# Patient Record
Sex: MALE | Race: WHITE | NOT HISPANIC OR LATINO | Employment: STUDENT | URBAN - METROPOLITAN AREA
[De-identification: names, ages, dates, MRNs, and addresses within clinical notes are randomized per-mention and may not be internally consistent; named-entity substitution may affect disease eponyms.]

---
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
## 2020-01-21 ENCOUNTER — OFFICE VISIT (OUTPATIENT)
Dept: INTERNAL MEDICINE | Facility: CLINIC | Age: 20
End: 2020-01-21
Payer: COMMERCIAL

## 2020-01-21 VITALS
OXYGEN SATURATION: 97 % | WEIGHT: 283.5 LBS | DIASTOLIC BLOOD PRESSURE: 82 MMHG | SYSTOLIC BLOOD PRESSURE: 132 MMHG | TEMPERATURE: 98 F | HEART RATE: 75 BPM | BODY MASS INDEX: 40.68 KG/M2

## 2020-01-21 DIAGNOSIS — J01.00 ACUTE NON-RECURRENT MAXILLARY SINUSITIS: Primary | ICD-10-CM

## 2020-01-21 DIAGNOSIS — F98.8 ATTENTION DEFICIT DISORDER, UNSPECIFIED HYPERACTIVITY PRESENCE: ICD-10-CM

## 2020-01-21 PROCEDURE — 99214 PR OFFICE/OUTPT VISIT, EST, LEVL IV, 30-39 MIN: ICD-10-PCS | Mod: S$GLB,,, | Performed by: FAMILY MEDICINE

## 2020-01-21 PROCEDURE — 99214 OFFICE O/P EST MOD 30 MIN: CPT | Mod: S$GLB,,, | Performed by: FAMILY MEDICINE

## 2020-01-21 PROCEDURE — 3008F PR BODY MASS INDEX (BMI) DOCUMENTED: ICD-10-PCS | Mod: CPTII,S$GLB,, | Performed by: FAMILY MEDICINE

## 2020-01-21 PROCEDURE — 99999 PR PBB SHADOW E&M-EST. PATIENT-LVL III: ICD-10-PCS | Mod: PBBFAC,,, | Performed by: FAMILY MEDICINE

## 2020-01-21 PROCEDURE — 99999 PR PBB SHADOW E&M-EST. PATIENT-LVL III: CPT | Mod: PBBFAC,,, | Performed by: FAMILY MEDICINE

## 2020-01-21 PROCEDURE — 3008F BODY MASS INDEX DOCD: CPT | Mod: CPTII,S$GLB,, | Performed by: FAMILY MEDICINE

## 2020-01-21 RX ORDER — AZITHROMYCIN 250 MG/1
TABLET, FILM COATED ORAL
Qty: 6 TABLET | Refills: 0 | Status: SHIPPED | OUTPATIENT
Start: 2020-01-21 | End: 2020-01-26

## 2020-01-21 RX ORDER — METHYLPREDNISOLONE 4 MG/1
TABLET ORAL
Qty: 1 PACKAGE | Refills: 0 | Status: SHIPPED | OUTPATIENT
Start: 2020-01-21 | End: 2021-01-19 | Stop reason: ALTCHOICE

## 2020-01-21 NOTE — PROGRESS NOTES
Subjective:       Patient ID: Simon Goode is a 19 y.o. male.    Chief Complaint: Nasal Congestion    Pt is a 19 year who has had for 1 week cough and sinus congestion and pressure. Pt is coughing worse at night lying down.     Review of Systems   Constitutional: Negative.    HENT: Positive for postnasal drip, sinus pressure and sinus pain.    Respiratory: Positive for cough.    Cardiovascular: Negative for chest pain and leg swelling.   Genitourinary: Negative.    Neurological: Negative.    Psychiatric/Behavioral: Negative.        Objective:      Physical Exam   Constitutional: He is oriented to person, place, and time. He appears well-developed and well-nourished.   HENT:   Right Ear: Tympanic membrane is erythematous. A middle ear effusion is present.   Left Ear: Tympanic membrane is erythematous. A middle ear effusion is present.   Nose: Mucosal edema and rhinorrhea present. Right sinus exhibits maxillary sinus tenderness.   Mouth/Throat: Posterior oropharyngeal erythema present.   Cardiovascular: Normal rate and regular rhythm. Exam reveals no friction rub.   No murmur heard.  Pulmonary/Chest: Effort normal and breath sounds normal.   Abdominal: Soft. Bowel sounds are normal.   Neurological: He is alert and oriented to person, place, and time.   Psychiatric: He has a normal mood and affect. His behavior is normal.       Assessment:       1. Acute non-recurrent maxillary sinusitis    2. Attention deficit disorder, unspecified hyperactivity presence        Plan:       Acute non-recurrent maxillary sinusitis  Comments:  azithromycin and medrol      Attention deficit disorder, unspecified hyperactivity presence  Comments:  Pt is as needed focalin    Other orders  -     azithromycin (Z-PARAG) 250 MG tablet; Take 2 tablets by mouth on day 1; Take 1 tablet by mouth on days 2-5  Dispense: 6 tablet; Refill: 0  -     methylPREDNISolone (MEDROL DOSEPACK) 4 mg tablet; use as directed  Dispense: 1 Package; Refill: 0

## 2020-03-19 RX ORDER — FLUOXETINE HYDROCHLORIDE 40 MG/1
40 CAPSULE ORAL DAILY
Qty: 90 CAPSULE | Refills: 2 | Status: SHIPPED | OUTPATIENT
Start: 2020-03-19 | End: 2020-12-22 | Stop reason: SDUPTHER

## 2020-09-29 ENCOUNTER — IMMUNIZATION (OUTPATIENT)
Dept: INTERNAL MEDICINE | Facility: CLINIC | Age: 20
End: 2020-09-29
Payer: COMMERCIAL

## 2020-09-29 PROCEDURE — 90686 FLU VACCINE (QUAD) GREATER THAN OR EQUAL TO 3YO PRESERVATIVE FREE IM: ICD-10-PCS | Mod: S$GLB,,, | Performed by: FAMILY MEDICINE

## 2020-09-29 PROCEDURE — 90471 IMMUNIZATION ADMIN: CPT | Mod: S$GLB,,, | Performed by: FAMILY MEDICINE

## 2020-09-29 PROCEDURE — 90471 FLU VACCINE (QUAD) GREATER THAN OR EQUAL TO 3YO PRESERVATIVE FREE IM: ICD-10-PCS | Mod: S$GLB,,, | Performed by: FAMILY MEDICINE

## 2020-09-29 PROCEDURE — 90686 IIV4 VACC NO PRSV 0.5 ML IM: CPT | Mod: S$GLB,,, | Performed by: FAMILY MEDICINE

## 2020-11-05 ENCOUNTER — TELEPHONE (OUTPATIENT)
Dept: INTERNAL MEDICINE | Facility: CLINIC | Age: 20
End: 2020-11-05

## 2020-11-05 NOTE — TELEPHONE ENCOUNTER
----- Message from Peri Goldstein sent at 11/5/2020  4:39 PM CST -----  Contact: Pt  Pt was exposed to Covid 19 at work and is seeking medical advice and testing, please call them back at [phone] (home)     Thanks    Peri Goldstein

## 2020-11-05 NOTE — TELEPHONE ENCOUNTER
Pt stated he was exposed to covid but has no symptoms. I informed him that we cannot test him unless he has symptoms. He verbalized understanding.

## 2020-12-18 RX ORDER — FLUOXETINE HYDROCHLORIDE 40 MG/1
40 CAPSULE ORAL DAILY
Qty: 90 CAPSULE | Refills: 2 | OUTPATIENT
Start: 2020-12-18 | End: 2021-03-18

## 2020-12-18 NOTE — TELEPHONE ENCOUNTER
Informed pt that we were unable to get his script filled due to him needing an appointment. He verbalized understanding and will call back to schedule.

## 2020-12-22 RX ORDER — FLUOXETINE HYDROCHLORIDE 40 MG/1
40 CAPSULE ORAL DAILY
Qty: 30 CAPSULE | Refills: 0 | Status: SHIPPED | OUTPATIENT
Start: 2020-12-22 | End: 2021-01-19 | Stop reason: SDUPTHER

## 2020-12-22 NOTE — TELEPHONE ENCOUNTER
Informed pt that his medication was renewed by the on call provider, but he will need to establish care with someone new. He verbalized understanding and scheduled appointment.

## 2021-01-01 NOTE — TELEPHONE ENCOUNTER
Approved but for a one month supply only   Patient needs a follow up appointment.  No additional refills without a visit.  Please call and schedule.   Single liveborn infant, delivered by

## 2021-01-19 ENCOUNTER — LAB VISIT (OUTPATIENT)
Dept: LAB | Facility: HOSPITAL | Age: 21
End: 2021-01-19
Attending: INTERNAL MEDICINE
Payer: COMMERCIAL

## 2021-01-19 ENCOUNTER — OFFICE VISIT (OUTPATIENT)
Dept: INTERNAL MEDICINE | Facility: CLINIC | Age: 21
End: 2021-01-19
Payer: COMMERCIAL

## 2021-01-19 VITALS
WEIGHT: 228.63 LBS | BODY MASS INDEX: 32.8 KG/M2 | HEART RATE: 88 BPM | TEMPERATURE: 97 F | SYSTOLIC BLOOD PRESSURE: 126 MMHG | OXYGEN SATURATION: 97 % | DIASTOLIC BLOOD PRESSURE: 80 MMHG

## 2021-01-19 DIAGNOSIS — F32.1 CURRENT MODERATE EPISODE OF MAJOR DEPRESSIVE DISORDER WITHOUT PRIOR EPISODE: ICD-10-CM

## 2021-01-19 DIAGNOSIS — Z00.00 ROUTINE GENERAL MEDICAL EXAMINATION AT HEALTH CARE FACILITY: ICD-10-CM

## 2021-01-19 DIAGNOSIS — Z00.00 ROUTINE GENERAL MEDICAL EXAMINATION AT HEALTH CARE FACILITY: Primary | ICD-10-CM

## 2021-01-19 PROBLEM — R68.89 FLU-LIKE SYMPTOMS: Status: RESOLVED | Noted: 2019-11-08 | Resolved: 2021-01-19

## 2021-01-19 LAB
BASOPHILS # BLD AUTO: 0.03 K/UL (ref 0–0.2)
BASOPHILS NFR BLD: 0.6 % (ref 0–1.9)
DIFFERENTIAL METHOD: NORMAL
EOSINOPHIL # BLD AUTO: 0.1 K/UL (ref 0–0.5)
EOSINOPHIL NFR BLD: 1.4 % (ref 0–8)
ERYTHROCYTE [DISTWIDTH] IN BLOOD BY AUTOMATED COUNT: 12.4 % (ref 11.5–14.5)
HCT VFR BLD AUTO: 48.6 % (ref 40–54)
HGB BLD-MCNC: 16.4 G/DL (ref 14–18)
IMM GRANULOCYTES # BLD AUTO: 0.01 K/UL (ref 0–0.04)
IMM GRANULOCYTES NFR BLD AUTO: 0.2 % (ref 0–0.5)
LYMPHOCYTES # BLD AUTO: 1.8 K/UL (ref 1–4.8)
LYMPHOCYTES NFR BLD: 37.2 % (ref 18–48)
MCH RBC QN AUTO: 30.7 PG (ref 27–31)
MCHC RBC AUTO-ENTMCNC: 33.7 G/DL (ref 32–36)
MCV RBC AUTO: 91 FL (ref 82–98)
MONOCYTES # BLD AUTO: 0.5 K/UL (ref 0.3–1)
MONOCYTES NFR BLD: 10 % (ref 4–15)
NEUTROPHILS # BLD AUTO: 2.5 K/UL (ref 1.8–7.7)
NEUTROPHILS NFR BLD: 50.6 % (ref 38–73)
NRBC BLD-RTO: 0 /100 WBC
PLATELET # BLD AUTO: 216 K/UL (ref 150–350)
PMV BLD AUTO: 11.5 FL (ref 9.2–12.9)
RBC # BLD AUTO: 5.35 M/UL (ref 4.6–6.2)
WBC # BLD AUTO: 4.89 K/UL (ref 3.9–12.7)

## 2021-01-19 PROCEDURE — 86703 HIV-1/HIV-2 1 RESULT ANTBDY: CPT

## 2021-01-19 PROCEDURE — 86803 HEPATITIS C AB TEST: CPT

## 2021-01-19 PROCEDURE — 85025 COMPLETE CBC W/AUTO DIFF WBC: CPT

## 2021-01-19 PROCEDURE — 3008F PR BODY MASS INDEX (BMI) DOCUMENTED: ICD-10-PCS | Mod: CPTII,S$GLB,, | Performed by: INTERNAL MEDICINE

## 2021-01-19 PROCEDURE — 99395 PREV VISIT EST AGE 18-39: CPT | Mod: S$GLB,,, | Performed by: INTERNAL MEDICINE

## 2021-01-19 PROCEDURE — 80061 LIPID PANEL: CPT

## 2021-01-19 PROCEDURE — 36415 COLL VENOUS BLD VENIPUNCTURE: CPT

## 2021-01-19 PROCEDURE — 80053 COMPREHEN METABOLIC PANEL: CPT

## 2021-01-19 PROCEDURE — 84439 ASSAY OF FREE THYROXINE: CPT

## 2021-01-19 PROCEDURE — 1126F PR PAIN SEVERITY QUANTIFIED, NO PAIN PRESENT: ICD-10-PCS | Mod: S$GLB,,, | Performed by: INTERNAL MEDICINE

## 2021-01-19 PROCEDURE — 99395 PR PREVENTIVE VISIT,EST,18-39: ICD-10-PCS | Mod: S$GLB,,, | Performed by: INTERNAL MEDICINE

## 2021-01-19 PROCEDURE — 99999 PR PBB SHADOW E&M-EST. PATIENT-LVL III: CPT | Mod: PBBFAC,,, | Performed by: INTERNAL MEDICINE

## 2021-01-19 PROCEDURE — 3008F BODY MASS INDEX DOCD: CPT | Mod: CPTII,S$GLB,, | Performed by: INTERNAL MEDICINE

## 2021-01-19 PROCEDURE — 1126F AMNT PAIN NOTED NONE PRSNT: CPT | Mod: S$GLB,,, | Performed by: INTERNAL MEDICINE

## 2021-01-19 PROCEDURE — 84443 ASSAY THYROID STIM HORMONE: CPT

## 2021-01-19 PROCEDURE — 99999 PR PBB SHADOW E&M-EST. PATIENT-LVL III: ICD-10-PCS | Mod: PBBFAC,,, | Performed by: INTERNAL MEDICINE

## 2021-01-19 RX ORDER — DEXMETHYLPHENIDATE HYDROCHLORIDE 10 MG/1
10 TABLET ORAL DAILY
Qty: 4 TABLET | Refills: 0 | Status: CANCELLED | OUTPATIENT
Start: 2021-01-19

## 2021-01-19 RX ORDER — FLUOXETINE HYDROCHLORIDE 40 MG/1
40 CAPSULE ORAL DAILY
Qty: 90 CAPSULE | Refills: 1 | Status: SHIPPED | OUTPATIENT
Start: 2021-01-19 | End: 2021-08-11

## 2021-01-20 LAB
ALBUMIN SERPL BCP-MCNC: 4.5 G/DL (ref 3.5–5.2)
ALP SERPL-CCNC: 64 U/L (ref 55–135)
ALT SERPL W/O P-5'-P-CCNC: 32 U/L (ref 10–44)
ANION GAP SERPL CALC-SCNC: 10 MMOL/L (ref 8–16)
AST SERPL-CCNC: 22 U/L (ref 10–40)
BILIRUB SERPL-MCNC: 0.4 MG/DL (ref 0.1–1)
BUN SERPL-MCNC: 16 MG/DL (ref 6–20)
CALCIUM SERPL-MCNC: 9.3 MG/DL (ref 8.7–10.5)
CHLORIDE SERPL-SCNC: 104 MMOL/L (ref 95–110)
CHOLEST SERPL-MCNC: 169 MG/DL (ref 120–199)
CHOLEST/HDLC SERPL: 3.1 {RATIO} (ref 2–5)
CO2 SERPL-SCNC: 26 MMOL/L (ref 23–29)
CREAT SERPL-MCNC: 0.8 MG/DL (ref 0.5–1.4)
EST. GFR  (AFRICAN AMERICAN): >60 ML/MIN/1.73 M^2
EST. GFR  (NON AFRICAN AMERICAN): >60 ML/MIN/1.73 M^2
GLUCOSE SERPL-MCNC: 94 MG/DL (ref 70–110)
HCV AB SERPL QL IA: NEGATIVE
HDLC SERPL-MCNC: 54 MG/DL (ref 40–75)
HDLC SERPL: 32 % (ref 20–50)
HIV 1+2 AB+HIV1 P24 AG SERPL QL IA: NEGATIVE
LDLC SERPL CALC-MCNC: 105.2 MG/DL (ref 63–159)
NONHDLC SERPL-MCNC: 115 MG/DL
POTASSIUM SERPL-SCNC: 4.1 MMOL/L (ref 3.5–5.1)
PROT SERPL-MCNC: 7.3 G/DL (ref 6–8.4)
SODIUM SERPL-SCNC: 140 MMOL/L (ref 136–145)
T4 FREE SERPL-MCNC: 1.16 NG/DL (ref 0.71–1.51)
TRIGL SERPL-MCNC: 49 MG/DL (ref 30–150)
TSH SERPL DL<=0.005 MIU/L-ACNC: 4.65 UIU/ML (ref 0.4–4)

## 2021-04-28 ENCOUNTER — PATIENT MESSAGE (OUTPATIENT)
Dept: RESEARCH | Facility: HOSPITAL | Age: 21
End: 2021-04-28

## 2021-08-06 ENCOUNTER — LAB VISIT (OUTPATIENT)
Dept: INTERNAL MEDICINE | Facility: CLINIC | Age: 21
End: 2021-08-06
Payer: COMMERCIAL

## 2021-08-06 ENCOUNTER — OFFICE VISIT (OUTPATIENT)
Dept: INTERNAL MEDICINE | Facility: CLINIC | Age: 21
End: 2021-08-06
Payer: COMMERCIAL

## 2021-08-06 ENCOUNTER — PATIENT MESSAGE (OUTPATIENT)
Dept: INTERNAL MEDICINE | Facility: CLINIC | Age: 21
End: 2021-08-06

## 2021-08-06 DIAGNOSIS — B09 VIRAL RASH: ICD-10-CM

## 2021-08-06 DIAGNOSIS — J06.9 VIRAL URI: ICD-10-CM

## 2021-08-06 DIAGNOSIS — J06.9 VIRAL URI: Primary | ICD-10-CM

## 2021-08-06 PROCEDURE — 1160F PR REVIEW ALL MEDS BY PRESCRIBER/CLIN PHARMACIST DOCUMENTED: ICD-10-PCS | Mod: CPTII,,, | Performed by: PHYSICIAN ASSISTANT

## 2021-08-06 PROCEDURE — 1159F MED LIST DOCD IN RCRD: CPT | Mod: CPTII,,, | Performed by: PHYSICIAN ASSISTANT

## 2021-08-06 PROCEDURE — 1159F PR MEDICATION LIST DOCUMENTED IN MEDICAL RECORD: ICD-10-PCS | Mod: CPTII,,, | Performed by: PHYSICIAN ASSISTANT

## 2021-08-06 PROCEDURE — U0003 INFECTIOUS AGENT DETECTION BY NUCLEIC ACID (DNA OR RNA); SEVERE ACUTE RESPIRATORY SYNDROME CORONAVIRUS 2 (SARS-COV-2) (CORONAVIRUS DISEASE [COVID-19]), AMPLIFIED PROBE TECHNIQUE, MAKING USE OF HIGH THROUGHPUT TECHNOLOGIES AS DESCRIBED BY CMS-2020-01-R: HCPCS | Performed by: PHYSICIAN ASSISTANT

## 2021-08-06 PROCEDURE — U0005 INFEC AGEN DETEC AMPLI PROBE: HCPCS | Performed by: PHYSICIAN ASSISTANT

## 2021-08-06 PROCEDURE — 99213 OFFICE O/P EST LOW 20 MIN: CPT | Mod: 95,,, | Performed by: PHYSICIAN ASSISTANT

## 2021-08-06 PROCEDURE — 1160F RVW MEDS BY RX/DR IN RCRD: CPT | Mod: CPTII,,, | Performed by: PHYSICIAN ASSISTANT

## 2021-08-06 PROCEDURE — 99213 PR OFFICE/OUTPT VISIT, EST, LEVL III, 20-29 MIN: ICD-10-PCS | Mod: 95,,, | Performed by: PHYSICIAN ASSISTANT

## 2021-08-07 LAB
SARS-COV-2 RNA RESP QL NAA+PROBE: NOT DETECTED
SARS-COV-2- CYCLE NUMBER: -1

## 2021-08-17 ENCOUNTER — PATIENT MESSAGE (OUTPATIENT)
Dept: INTERNAL MEDICINE | Facility: CLINIC | Age: 21
End: 2021-08-17

## 2021-09-07 ENCOUNTER — TELEPHONE (OUTPATIENT)
Dept: INTERNAL MEDICINE | Facility: CLINIC | Age: 21
End: 2021-09-07

## 2021-09-08 ENCOUNTER — OFFICE VISIT (OUTPATIENT)
Dept: INTERNAL MEDICINE | Facility: CLINIC | Age: 21
End: 2021-09-08
Payer: COMMERCIAL

## 2021-09-08 VITALS
SYSTOLIC BLOOD PRESSURE: 130 MMHG | WEIGHT: 214.06 LBS | OXYGEN SATURATION: 97 % | HEIGHT: 70 IN | DIASTOLIC BLOOD PRESSURE: 62 MMHG | TEMPERATURE: 96 F | BODY MASS INDEX: 30.65 KG/M2 | HEART RATE: 75 BPM

## 2021-09-08 DIAGNOSIS — R19.7 DIARRHEA, UNSPECIFIED TYPE: Primary | ICD-10-CM

## 2021-09-08 DIAGNOSIS — R11.0 NAUSEA: ICD-10-CM

## 2021-09-08 PROCEDURE — 99214 OFFICE O/P EST MOD 30 MIN: CPT | Mod: S$GLB,,, | Performed by: INTERNAL MEDICINE

## 2021-09-08 PROCEDURE — 99999 PR PBB SHADOW E&M-EST. PATIENT-LVL IV: ICD-10-PCS | Mod: PBBFAC,,, | Performed by: INTERNAL MEDICINE

## 2021-09-08 PROCEDURE — 1159F PR MEDICATION LIST DOCUMENTED IN MEDICAL RECORD: ICD-10-PCS | Mod: CPTII,S$GLB,, | Performed by: INTERNAL MEDICINE

## 2021-09-08 PROCEDURE — 3008F PR BODY MASS INDEX (BMI) DOCUMENTED: ICD-10-PCS | Mod: CPTII,S$GLB,, | Performed by: INTERNAL MEDICINE

## 2021-09-08 PROCEDURE — 1159F MED LIST DOCD IN RCRD: CPT | Mod: CPTII,S$GLB,, | Performed by: INTERNAL MEDICINE

## 2021-09-08 PROCEDURE — 3078F PR MOST RECENT DIASTOLIC BLOOD PRESSURE < 80 MM HG: ICD-10-PCS | Mod: CPTII,S$GLB,, | Performed by: INTERNAL MEDICINE

## 2021-09-08 PROCEDURE — 3075F SYST BP GE 130 - 139MM HG: CPT | Mod: CPTII,S$GLB,, | Performed by: INTERNAL MEDICINE

## 2021-09-08 PROCEDURE — 3075F PR MOST RECENT SYSTOLIC BLOOD PRESS GE 130-139MM HG: ICD-10-PCS | Mod: CPTII,S$GLB,, | Performed by: INTERNAL MEDICINE

## 2021-09-08 PROCEDURE — 3008F BODY MASS INDEX DOCD: CPT | Mod: CPTII,S$GLB,, | Performed by: INTERNAL MEDICINE

## 2021-09-08 PROCEDURE — 99214 PR OFFICE/OUTPT VISIT, EST, LEVL IV, 30-39 MIN: ICD-10-PCS | Mod: S$GLB,,, | Performed by: INTERNAL MEDICINE

## 2021-09-08 PROCEDURE — 99999 PR PBB SHADOW E&M-EST. PATIENT-LVL IV: CPT | Mod: PBBFAC,,, | Performed by: INTERNAL MEDICINE

## 2021-09-08 PROCEDURE — 3078F DIAST BP <80 MM HG: CPT | Mod: CPTII,S$GLB,, | Performed by: INTERNAL MEDICINE

## 2021-09-16 ENCOUNTER — LAB VISIT (OUTPATIENT)
Dept: LAB | Facility: HOSPITAL | Age: 21
End: 2021-09-16
Attending: INTERNAL MEDICINE
Payer: COMMERCIAL

## 2021-09-16 ENCOUNTER — LAB VISIT (OUTPATIENT)
Dept: LAB | Facility: HOSPITAL | Age: 21
End: 2021-09-16
Payer: COMMERCIAL

## 2021-09-16 DIAGNOSIS — R19.7 DIARRHEA, UNSPECIFIED TYPE: ICD-10-CM

## 2021-09-16 LAB
ALBUMIN SERPL BCP-MCNC: 4.5 G/DL (ref 3.5–5.2)
ALP SERPL-CCNC: 63 U/L (ref 55–135)
ALT SERPL W/O P-5'-P-CCNC: 32 U/L (ref 10–44)
ANION GAP SERPL CALC-SCNC: 13 MMOL/L (ref 8–16)
AST SERPL-CCNC: 21 U/L (ref 10–40)
BILIRUB SERPL-MCNC: 1.3 MG/DL (ref 0.1–1)
BUN SERPL-MCNC: 12 MG/DL (ref 6–20)
CALCIUM SERPL-MCNC: 10.3 MG/DL (ref 8.7–10.5)
CHLORIDE SERPL-SCNC: 104 MMOL/L (ref 95–110)
CO2 SERPL-SCNC: 24 MMOL/L (ref 23–29)
CREAT SERPL-MCNC: 0.8 MG/DL (ref 0.5–1.4)
EST. GFR  (AFRICAN AMERICAN): >60 ML/MIN/1.73 M^2
EST. GFR  (NON AFRICAN AMERICAN): >60 ML/MIN/1.73 M^2
GLUCOSE SERPL-MCNC: 96 MG/DL (ref 70–110)
POTASSIUM SERPL-SCNC: 4.2 MMOL/L (ref 3.5–5.1)
PROT SERPL-MCNC: 7.7 G/DL (ref 6–8.4)
SODIUM SERPL-SCNC: 141 MMOL/L (ref 136–145)

## 2021-09-16 PROCEDURE — 89055 LEUKOCYTE ASSESSMENT FECAL: CPT | Performed by: INTERNAL MEDICINE

## 2021-09-16 PROCEDURE — 36415 COLL VENOUS BLD VENIPUNCTURE: CPT | Performed by: INTERNAL MEDICINE

## 2021-09-16 PROCEDURE — 87046 STOOL CULTR AEROBIC BACT EA: CPT | Mod: 59 | Performed by: INTERNAL MEDICINE

## 2021-09-16 PROCEDURE — 84439 ASSAY OF FREE THYROXINE: CPT | Performed by: INTERNAL MEDICINE

## 2021-09-16 PROCEDURE — 87177 OVA AND PARASITES SMEARS: CPT | Performed by: INTERNAL MEDICINE

## 2021-09-16 PROCEDURE — 87045 FECES CULTURE AEROBIC BACT: CPT | Performed by: INTERNAL MEDICINE

## 2021-09-16 PROCEDURE — 85025 COMPLETE CBC W/AUTO DIFF WBC: CPT | Performed by: INTERNAL MEDICINE

## 2021-09-16 PROCEDURE — 82272 OCCULT BLD FECES 1-3 TESTS: CPT | Performed by: INTERNAL MEDICINE

## 2021-09-16 PROCEDURE — 87427 SHIGA-LIKE TOXIN AG IA: CPT | Performed by: INTERNAL MEDICINE

## 2021-09-16 PROCEDURE — 83993 ASSAY FOR CALPROTECTIN FECAL: CPT | Performed by: INTERNAL MEDICINE

## 2021-09-16 PROCEDURE — 89125 SPECIMEN FAT STAIN: CPT | Performed by: INTERNAL MEDICINE

## 2021-09-16 PROCEDURE — 87329 GIARDIA AG IA: CPT | Performed by: INTERNAL MEDICINE

## 2021-09-16 PROCEDURE — 84443 ASSAY THYROID STIM HORMONE: CPT | Performed by: INTERNAL MEDICINE

## 2021-09-16 PROCEDURE — 80053 COMPREHEN METABOLIC PANEL: CPT | Performed by: INTERNAL MEDICINE

## 2021-09-16 PROCEDURE — 87324 CLOSTRIDIUM AG IA: CPT | Performed by: INTERNAL MEDICINE

## 2021-09-16 PROCEDURE — 82656 EL-1 FECAL QUAL/SEMIQ: CPT | Performed by: INTERNAL MEDICINE

## 2021-09-16 PROCEDURE — 87449 NOS EACH ORGANISM AG IA: CPT | Performed by: INTERNAL MEDICINE

## 2021-09-16 PROCEDURE — 87338 HPYLORI STOOL AG IA: CPT | Performed by: INTERNAL MEDICINE

## 2021-09-17 LAB
BASOPHILS # BLD AUTO: 0.02 K/UL (ref 0–0.2)
BASOPHILS NFR BLD: 0.4 % (ref 0–1.9)
C DIFF GDH STL QL: NEGATIVE
C DIFF TOX A+B STL QL IA: NEGATIVE
CRYPTOSP AG STL QL IA: NEGATIVE
DIFFERENTIAL METHOD: NORMAL
EOSINOPHIL # BLD AUTO: 0 K/UL (ref 0–0.5)
EOSINOPHIL NFR BLD: 0.6 % (ref 0–8)
ERYTHROCYTE [DISTWIDTH] IN BLOOD BY AUTOMATED COUNT: 12.7 % (ref 11.5–14.5)
G LAMBLIA AG STL QL IA: NEGATIVE
HCT VFR BLD AUTO: 51 % (ref 40–54)
HGB BLD-MCNC: 16.9 G/DL (ref 14–18)
IMM GRANULOCYTES # BLD AUTO: 0.01 K/UL (ref 0–0.04)
IMM GRANULOCYTES NFR BLD AUTO: 0.2 % (ref 0–0.5)
LYMPHOCYTES # BLD AUTO: 1.4 K/UL (ref 1–4.8)
LYMPHOCYTES NFR BLD: 28.2 % (ref 18–48)
MCH RBC QN AUTO: 30.3 PG (ref 27–31)
MCHC RBC AUTO-ENTMCNC: 33.1 G/DL (ref 32–36)
MCV RBC AUTO: 92 FL (ref 82–98)
MONOCYTES # BLD AUTO: 0.5 K/UL (ref 0.3–1)
MONOCYTES NFR BLD: 10.4 % (ref 4–15)
NEUTROPHILS # BLD AUTO: 3 K/UL (ref 1.8–7.7)
NEUTROPHILS NFR BLD: 60.2 % (ref 38–73)
NRBC BLD-RTO: 0 /100 WBC
OB PNL STL: NEGATIVE
PLATELET # BLD AUTO: 221 K/UL (ref 150–450)
PMV BLD AUTO: 11.9 FL (ref 9.2–12.9)
RBC # BLD AUTO: 5.57 M/UL (ref 4.6–6.2)
T4 FREE SERPL-MCNC: 0.93 NG/DL (ref 0.71–1.51)
TSH SERPL DL<=0.005 MIU/L-ACNC: 0.98 UIU/ML (ref 0.4–4)
WBC # BLD AUTO: 4.9 K/UL (ref 3.9–12.7)

## 2021-09-18 LAB — WBC #/AREA STL HPF: NORMAL /[HPF]

## 2021-09-20 LAB
BACTERIA STL CULT: NORMAL
E COLI SXT1 STL QL IA: NEGATIVE
E COLI SXT2 STL QL IA: NEGATIVE
O+P STL MICRO: NORMAL

## 2021-09-21 LAB
CALPROTECTIN STL-MCNT: <27.1 MCG/G
ELASTASE 1, FECAL: 237 MCG/G
FAT STL SUDAN IV STN: NORMAL

## 2021-09-26 LAB
H PYLORI AG STL QL IA: NORMAL
SPECIMEN SOURCE: NORMAL

## 2021-10-05 ENCOUNTER — PATIENT OUTREACH (OUTPATIENT)
Dept: ADMINISTRATIVE | Facility: OTHER | Age: 21
End: 2021-10-05

## 2021-10-06 ENCOUNTER — OFFICE VISIT (OUTPATIENT)
Dept: GASTROENTEROLOGY | Facility: CLINIC | Age: 21
End: 2021-10-06
Payer: COMMERCIAL

## 2021-10-06 VITALS
SYSTOLIC BLOOD PRESSURE: 152 MMHG | BODY MASS INDEX: 31.02 KG/M2 | HEIGHT: 70 IN | WEIGHT: 216.69 LBS | RESPIRATION RATE: 20 BRPM | HEART RATE: 88 BPM | DIASTOLIC BLOOD PRESSURE: 78 MMHG

## 2021-10-06 DIAGNOSIS — K58.2 IRRITABLE BOWEL SYNDROME WITH BOTH CONSTIPATION AND DIARRHEA: Primary | ICD-10-CM

## 2021-10-06 DIAGNOSIS — R11.0 NAUSEA: ICD-10-CM

## 2021-10-06 DIAGNOSIS — R19.7 DIARRHEA, UNSPECIFIED TYPE: ICD-10-CM

## 2021-10-06 PROCEDURE — 3078F PR MOST RECENT DIASTOLIC BLOOD PRESSURE < 80 MM HG: ICD-10-PCS | Mod: CPTII,S$GLB,, | Performed by: NURSE PRACTITIONER

## 2021-10-06 PROCEDURE — 3077F PR MOST RECENT SYSTOLIC BLOOD PRESSURE >= 140 MM HG: ICD-10-PCS | Mod: CPTII,S$GLB,, | Performed by: NURSE PRACTITIONER

## 2021-10-06 PROCEDURE — 1160F PR REVIEW ALL MEDS BY PRESCRIBER/CLIN PHARMACIST DOCUMENTED: ICD-10-PCS | Mod: CPTII,S$GLB,, | Performed by: NURSE PRACTITIONER

## 2021-10-06 PROCEDURE — 99999 PR PBB SHADOW E&M-EST. PATIENT-LVL IV: CPT | Mod: PBBFAC,,, | Performed by: NURSE PRACTITIONER

## 2021-10-06 PROCEDURE — 99204 OFFICE O/P NEW MOD 45 MIN: CPT | Mod: S$GLB,,, | Performed by: NURSE PRACTITIONER

## 2021-10-06 PROCEDURE — 99999 PR PBB SHADOW E&M-EST. PATIENT-LVL IV: ICD-10-PCS | Mod: PBBFAC,,, | Performed by: NURSE PRACTITIONER

## 2021-10-06 PROCEDURE — 3008F PR BODY MASS INDEX (BMI) DOCUMENTED: ICD-10-PCS | Mod: CPTII,S$GLB,, | Performed by: NURSE PRACTITIONER

## 2021-10-06 PROCEDURE — 1159F MED LIST DOCD IN RCRD: CPT | Mod: CPTII,S$GLB,, | Performed by: NURSE PRACTITIONER

## 2021-10-06 PROCEDURE — 3078F DIAST BP <80 MM HG: CPT | Mod: CPTII,S$GLB,, | Performed by: NURSE PRACTITIONER

## 2021-10-06 PROCEDURE — 3077F SYST BP >= 140 MM HG: CPT | Mod: CPTII,S$GLB,, | Performed by: NURSE PRACTITIONER

## 2021-10-06 PROCEDURE — 99204 PR OFFICE/OUTPT VISIT, NEW, LEVL IV, 45-59 MIN: ICD-10-PCS | Mod: S$GLB,,, | Performed by: NURSE PRACTITIONER

## 2021-10-06 PROCEDURE — 3008F BODY MASS INDEX DOCD: CPT | Mod: CPTII,S$GLB,, | Performed by: NURSE PRACTITIONER

## 2021-10-06 PROCEDURE — 1159F PR MEDICATION LIST DOCUMENTED IN MEDICAL RECORD: ICD-10-PCS | Mod: CPTII,S$GLB,, | Performed by: NURSE PRACTITIONER

## 2021-10-06 PROCEDURE — 1160F RVW MEDS BY RX/DR IN RCRD: CPT | Mod: CPTII,S$GLB,, | Performed by: NURSE PRACTITIONER

## 2021-10-06 RX ORDER — DICYCLOMINE HYDROCHLORIDE 20 MG/1
20 TABLET ORAL 3 TIMES DAILY PRN
Qty: 90 TABLET | Refills: 0 | Status: SHIPPED | OUTPATIENT
Start: 2021-10-06 | End: 2021-11-04 | Stop reason: SDUPTHER

## 2021-11-04 DIAGNOSIS — K58.2 IRRITABLE BOWEL SYNDROME WITH BOTH CONSTIPATION AND DIARRHEA: ICD-10-CM

## 2021-11-04 RX ORDER — DICYCLOMINE HYDROCHLORIDE 20 MG/1
20 TABLET ORAL 3 TIMES DAILY PRN
Qty: 90 TABLET | Refills: 11 | Status: SHIPPED | OUTPATIENT
Start: 2021-11-04 | End: 2022-04-05 | Stop reason: SDUPTHER

## 2022-01-24 ENCOUNTER — OFFICE VISIT (OUTPATIENT)
Dept: GASTROENTEROLOGY | Facility: CLINIC | Age: 22
End: 2022-01-24
Payer: COMMERCIAL

## 2022-01-24 DIAGNOSIS — K58.2 IRRITABLE BOWEL SYNDROME WITH BOTH CONSTIPATION AND DIARRHEA: Primary | ICD-10-CM

## 2022-01-24 PROCEDURE — 1160F RVW MEDS BY RX/DR IN RCRD: CPT | Mod: CPTII,95,, | Performed by: NURSE PRACTITIONER

## 2022-01-24 PROCEDURE — 99213 PR OFFICE/OUTPT VISIT, EST, LEVL III, 20-29 MIN: ICD-10-PCS | Mod: 95,,, | Performed by: NURSE PRACTITIONER

## 2022-01-24 PROCEDURE — 1159F MED LIST DOCD IN RCRD: CPT | Mod: CPTII,95,, | Performed by: NURSE PRACTITIONER

## 2022-01-24 PROCEDURE — 1159F PR MEDICATION LIST DOCUMENTED IN MEDICAL RECORD: ICD-10-PCS | Mod: CPTII,95,, | Performed by: NURSE PRACTITIONER

## 2022-01-24 PROCEDURE — 99213 OFFICE O/P EST LOW 20 MIN: CPT | Mod: 95,,, | Performed by: NURSE PRACTITIONER

## 2022-01-24 PROCEDURE — 1160F PR REVIEW ALL MEDS BY PRESCRIBER/CLIN PHARMACIST DOCUMENTED: ICD-10-PCS | Mod: CPTII,95,, | Performed by: NURSE PRACTITIONER

## 2022-01-25 NOTE — PROGRESS NOTES
Clinic Follow Up:  [facility] Follow Up Note    Reason for Follow Up:  The encounter diagnosis was Irritable bowel syndrome with both constipation and diarrhea.    PCP: Bunny Philip       HPI:  This is a 21 y.o. male here for follow up of IBS.   He has had great improvement with Bentyl. He is wondering if he can take Bentyl every day.     Review of Systems   Constitutional: Negative for activity change and appetite change.        As per interval history above   Respiratory: Negative for cough and shortness of breath.    Cardiovascular: Negative for chest pain.   Gastrointestinal: Negative for abdominal distention, abdominal pain, anal bleeding, blood in stool, constipation, diarrhea, nausea, rectal pain and vomiting.   Skin: Negative for color change and rash.       Medical History:  Past Medical History:   Diagnosis Date    ADHD (attention deficit hyperactivity disorder)     Chronic constipation     Chronic diarrhea     Depression     GERD (gastroesophageal reflux disease)        Surgical History:   No past surgical history on file.    Family History:   Family History   Problem Relation Age of Onset    Depression Mother     No Known Problems Father     Cancer Paternal Grandfather         prostate    Depression Brother        Social History:   Social History     Tobacco Use    Smoking status: Never Smoker    Smokeless tobacco: Never Used   Substance Use Topics    Alcohol use: Not Currently     Alcohol/week: 1.0 standard drink     Types: 1 Cans of beer per week    Drug use: Not Currently     Frequency: 1.0 times per week     Types: Marijuana       Allergies: Review of patient's allergies indicates:  No Known Allergies    Home Medications:  Current Outpatient Medications on File Prior to Visit   Medication Sig Dispense Refill    dicyclomine (BENTYL) 20 mg tablet Take 1 tablet (20 mg total) by mouth 3 (three) times daily as needed (abdominal pain). 90 tablet 11    FLUoxetine 40 MG  capsule TAKE 1 CAPSULE BY MOUTH EVERY DAY 90 capsule 0     No current facility-administered medications on file prior to visit.       There were no vitals taken for this visit.  There is no height or weight on file to calculate BMI.  Physical Exam  Constitutional:       General: He is not in acute distress.  HENT:      Head: Normocephalic and atraumatic.   Eyes:      General: No scleral icterus.     Conjunctiva/sclera: Conjunctivae normal.   Cardiovascular:      Rate and Rhythm: Normal rate and regular rhythm.      Heart sounds: Normal heart sounds.   Pulmonary:      Effort: Pulmonary effort is normal. No respiratory distress.      Breath sounds: Normal breath sounds.   Skin:     General: Skin is warm and dry.      Findings: No rash.   Neurological:      General: No focal deficit present.      Mental Status: He is alert and oriented to person, place, and time.      Coordination: Coordination normal.      Gait: Gait normal.   Psychiatric:         Mood and Affect: Mood normal.         Behavior: Behavior normal.         Labs: Pertinent labs reviewed.  CRC Screening: NA    Assessment:   1. Irritable bowel syndrome with both constipation and diarrhea - improvement in symptoms on Bentyl        Recommendations:   - continue daily bentyl. May increase to BID dosing if needed     Irritable bowel syndrome with both constipation and diarrhea        Return to Clinic:  Follow up in about 1 year (around 1/24/2023), or if symptoms worsen or fail to improve.    Thank you for the opportunity to participate in the care of this patient.  DIANA Gudino

## 2022-02-18 RX ORDER — FLUOXETINE HYDROCHLORIDE 40 MG/1
40 CAPSULE ORAL DAILY
Qty: 90 CAPSULE | Refills: 1 | Status: SHIPPED | OUTPATIENT
Start: 2022-02-18 | End: 2022-08-18

## 2022-02-18 NOTE — TELEPHONE ENCOUNTER
No new care gaps identified.  Powered by [employer]. Reference number: [id number].   2/18/2022 12:11:59 AM CST

## 2022-02-18 NOTE — TELEPHONE ENCOUNTER
Refill Authorization Note   Simon Goode  is requesting a refill authorization.  Brief Assessment and Rationale for Refill:  Approve     Medication Therapy Plan:       Medication Reconciliation Completed: No   Comments:   --->Care Gap information included below if applicable.   Orders Placed This Encounter    FLUoxetine 40 MG capsule      Requested Prescriptions   Signed Prescriptions Disp Refills    FLUoxetine 40 MG capsule 90 capsule 1     Sig: Take 1 capsule (40 mg total) by mouth once daily.       Psychiatry:  Antidepressants - SSRI Passed - 2/18/2022 12:11 AM        Passed - Patient is at least 18 years old        Passed - Valid encounter within last 15 months     Recent Visits  Date Type Provider Dept   09/08/21 Office Visit Bunny Philip MD [facility]   01/19/21 Office Visit Bunny Philip MD [facility]   Showing recent visits within past 720 days and meeting all other requirements  Future Appointments  No visits were found meeting these conditions.  Showing future appointments within next 150 days and meeting all other requirements                    Appointments  past 12m or future 3m with PCP    Date Provider   Last Visit   9/8/2021 Bunny Philip MD   Next Visit   Visit date not found Bunny Philip MD   ED visits in past 90 days: 0     Note composed:11:44 AM 02/18/2022

## 2022-02-21 ENCOUNTER — OFFICE VISIT (OUTPATIENT)
Dept: INTERNAL MEDICINE | Facility: CLINIC | Age: 22
End: 2022-02-21
Payer: COMMERCIAL

## 2022-02-21 DIAGNOSIS — R09.81 NASAL CONGESTION: Primary | ICD-10-CM

## 2022-02-21 DIAGNOSIS — J02.9 SORE THROAT: ICD-10-CM

## 2022-02-21 PROCEDURE — 99213 OFFICE O/P EST LOW 20 MIN: CPT | Mod: 95,,, | Performed by: NURSE PRACTITIONER

## 2022-02-21 PROCEDURE — 99213 PR OFFICE/OUTPT VISIT, EST, LEVL III, 20-29 MIN: ICD-10-PCS | Mod: 95,,, | Performed by: NURSE PRACTITIONER

## 2022-02-21 RX ORDER — FLUTICASONE PROPIONATE 50 MCG
2 SPRAY, SUSPENSION (ML) NASAL DAILY
Qty: 9.9 ML | Refills: 0 | Status: SHIPPED | OUTPATIENT
Start: 2022-02-21 | End: 2022-03-17 | Stop reason: SDUPTHER

## 2022-02-21 RX ORDER — ONDANSETRON 4 MG/1
4 TABLET, ORALLY DISINTEGRATING ORAL 3 TIMES DAILY PRN
COMMUNITY
Start: 2021-09-22

## 2022-02-21 NOTE — PROGRESS NOTES
Explained nocturia likely multifactorial.   · Could be related to his prostate  · Could be related to fluid that accumulates in his legs throughout day  · Could be undiagnosed sleep apnea    Will workup and treat for enlarged prostate  · Continue flomax 0.8mg nightly  · Continue tadalafil 5mg daily  · Repeat uroflow and pvr one more time. Flow was very slow. Last catheter was 6/2019.  · Cystoscopy and TRUS no 11/23/20. preop covid 3d beforehand. Will see if pt candidate for any procedure (rezum? On coumadin, hated turp and bled significantly after).   · Return for one more uroflow and PVR    Explained nocturia may not improve significantly with treatment of prostate. Might go from 4x to 3x, would still recommend workup for other factors like sleep apnea  · Sleep study from PCP  · Wear compression stockings every day  · Lift legs 2 hours before bedtime in recliner  · Limit fluids 3 hours before bedtime  · No caffeine 6 hours before bedtime        You have NOCTURIA (you urinate frequently, mainly at night).     Nocturia is multi-factorial, meaning that it can be from a number of reasons. We can treat one aspect with a medication but if you are urinating 5x a night, it may only improve it to 4x a night. You would need further evaluation (sleep study, diuretic medicine change) to bring you down to 1-3x a night.      Bladder storage issues (if your main issue with urinating too frequently is at night only, then it is unlikely bladder storage is your only reason for waking up at night to urinate. If you had a small bladder or could not store urine that well, you would have urinary frequency during nighttime and daytime). Overactive bladder medications may help if you have frequency both night AND day.    Heart or cardiac issues causing fluid build up as you stand throughout the day. You may notice fluid accumulates in the legs during the day (you can see an indent from your socks). When you lay down out at night, that  Subjective:       Patient ID: Simon Goode is a 21 y.o. male.    Chief Complaint: Sore Throat    The patient location is: [place]  The chief complaint leading to consultation is: sore throat     Visit type: audiovisual    Face to Face time with patient: 5 minutes of total time spent on the encounter, which includes face to face time and non-face to face time preparing to see the patient (eg, review of tests), Obtaining and/or reviewing separately obtained history, Documenting clinical information in the electronic or other health record, Independently interpreting results (not separately reported) and communicating results to the patient/family/caregiver, or Care coordination (not separately reported).         Each patient to whom he or she provides medical services by telemedicine is:  (1) informed of the relationship between the physician and patient and the respective role of any other health care provider with respect to management of the patient; and (2) notified that he or she may decline to receive medical services by telemedicine and may withdraw from such care at any time.    Notes:     Patient presents with congestion and sore throat.      Sore Throat   This is a new problem. The current episode started in the past 7 days (a few days ago ). The problem has been waxing and waning. Neither side of throat is experiencing more pain than the other. There has been no fever. The pain is at a severity of 5/10. The pain is moderate. Associated symptoms include congestion and a hoarse voice. Pertinent negatives include no abdominal pain, coughing, diarrhea, drooling, ear discharge, ear pain, headaches, plugged ear sensation, neck pain, shortness of breath, stridor, swollen glands, trouble swallowing or vomiting. He has had no exposure to strep or mono. He has tried acetaminophen and cool liquids for the symptoms. The treatment provided moderate relief.     Review of Systems   HENT: Positive for nasal congestion, hoarse  fluid then is reabsorbed into your bloodstream and travels to your heart and kidneys and you urinate the fluid out at night instead of the day. Wearing compression stockings available from medical supply stores, all day can help keep that fluid from accumulating as much. You can also lift your legs in a recliner 2 hours before bedtime.    Poor sleep from undiagnosed restless leg syndrome or obstructive sleep apnea. This can only be diagnosed by a sleep study ordered by your primary care or a pulmonologist. If your main issue is at night, this is often times the main cause. Treatment with a sleep mask if you are diagnosed with sleep apnea can help you significantly decrease the amount of nighttime urination. A medicine is unlikely to help you urinate much less if Obstructive Sleep Apnea is your diagnosis.    Diuretic - taking a medication that makes you urinate can increase your nighttime frequency. Taking it at a different time may help. If you take it at night, try taking it in the morning for a week to see if this helps.    Caffeine intake - stop drinking any caffeine (coffee, sodas) after 3pm or 6 hours before bedtime if you do not have normal sleep hours   Fluid intake- limit fluid intake 3 hours before bedtime    Alcohol intake - limit any alcohol after 3 pm or 6 hours before bedtime   Prostate issues- a large prostate can cause you to have daytime and nighttime urinary frequency. Trying a prostate medication may help some but again often times it is more than just the prostate causing the issue.    Nocturnal polyuria- Urine production is mostly at night- keeping a diary of how much urine you produce during the day verses the night for at least 3 days can help with this. If you make more than 30% of your total urine at night, you could have nocturnal polyuria (making too much urine at night) and we can refer you to a nephrologist/kidney doctor to see if a medication may help you produce less urine at  voice and sore throat. Negative for drooling, ear discharge, ear pain and trouble swallowing.    Respiratory: Negative for cough, shortness of breath and stridor.    Gastrointestinal: Negative for abdominal pain, diarrhea and vomiting.   Musculoskeletal: Negative for neck pain.   Neurological: Negative for headaches.         Objective:      Physical Exam  Constitutional:       Appearance: Normal appearance.   Pulmonary:      Effort: Pulmonary effort is normal. No respiratory distress.   Neurological:      General: No focal deficit present.      Mental Status: He is alert.   Psychiatric:         Mood and Affect: Mood normal.         Behavior: Behavior is cooperative.         Assessment:       Problem List Items Addressed This Visit    None     Visit Diagnoses     Nasal congestion    -  Primary    Relevant Medications    fluticasone propionate (FLONASE) 50 mcg/actuation nasal spray    Sore throat              Plan:         Nasal congestion  -     fluticasone propionate (FLONASE) 50 mcg/actuation nasal spray; 2 sprays (100 mcg total) by Each Nostril route once daily.  Dispense: 9.9 mL; Refill: 0    Sore throat        Warm salt/water gargles.    Start Flonase.     Recommend Covid testing.     night.

## 2022-02-22 ENCOUNTER — TELEPHONE (OUTPATIENT)
Dept: INTERNAL MEDICINE | Facility: CLINIC | Age: 22
End: 2022-02-22
Payer: COMMERCIAL

## 2022-02-23 ENCOUNTER — OFFICE VISIT (OUTPATIENT)
Dept: INTERNAL MEDICINE | Facility: CLINIC | Age: 22
End: 2022-02-23
Payer: COMMERCIAL

## 2022-02-23 VITALS
DIASTOLIC BLOOD PRESSURE: 80 MMHG | BODY MASS INDEX: 30.87 KG/M2 | HEART RATE: 78 BPM | HEIGHT: 70 IN | OXYGEN SATURATION: 98 % | WEIGHT: 215.63 LBS | SYSTOLIC BLOOD PRESSURE: 130 MMHG

## 2022-02-23 DIAGNOSIS — J01.90 ACUTE SINUSITIS, RECURRENCE NOT SPECIFIED, UNSPECIFIED LOCATION: Primary | ICD-10-CM

## 2022-02-23 LAB
CTP QC/QA: YES
SARS-COV-2 RDRP RESP QL NAA+PROBE: NEGATIVE

## 2022-02-23 PROCEDURE — 96372 THER/PROPH/DIAG INJ SC/IM: CPT | Mod: S$GLB,,, | Performed by: PHYSICIAN ASSISTANT

## 2022-02-23 PROCEDURE — 3075F PR MOST RECENT SYSTOLIC BLOOD PRESS GE 130-139MM HG: ICD-10-PCS | Mod: CPTII,S$GLB,, | Performed by: PHYSICIAN ASSISTANT

## 2022-02-23 PROCEDURE — 3079F DIAST BP 80-89 MM HG: CPT | Mod: CPTII,S$GLB,, | Performed by: PHYSICIAN ASSISTANT

## 2022-02-23 PROCEDURE — 96372 PR INJECTION,THERAP/PROPH/DIAG2ST, IM OR SUBCUT: ICD-10-PCS | Mod: S$GLB,,, | Performed by: PHYSICIAN ASSISTANT

## 2022-02-23 PROCEDURE — 3075F SYST BP GE 130 - 139MM HG: CPT | Mod: CPTII,S$GLB,, | Performed by: PHYSICIAN ASSISTANT

## 2022-02-23 PROCEDURE — 99214 PR OFFICE/OUTPT VISIT, EST, LEVL IV, 30-39 MIN: ICD-10-PCS | Mod: 25,S$GLB,, | Performed by: PHYSICIAN ASSISTANT

## 2022-02-23 PROCEDURE — 99999 PR PBB SHADOW E&M-EST. PATIENT-LVL IV: CPT | Mod: PBBFAC,,, | Performed by: PHYSICIAN ASSISTANT

## 2022-02-23 PROCEDURE — 3008F PR BODY MASS INDEX (BMI) DOCUMENTED: ICD-10-PCS | Mod: CPTII,S$GLB,, | Performed by: PHYSICIAN ASSISTANT

## 2022-02-23 PROCEDURE — 99999 PR PBB SHADOW E&M-EST. PATIENT-LVL IV: ICD-10-PCS | Mod: PBBFAC,,, | Performed by: PHYSICIAN ASSISTANT

## 2022-02-23 PROCEDURE — 1160F PR REVIEW ALL MEDS BY PRESCRIBER/CLIN PHARMACIST DOCUMENTED: ICD-10-PCS | Mod: CPTII,S$GLB,, | Performed by: PHYSICIAN ASSISTANT

## 2022-02-23 PROCEDURE — 1159F PR MEDICATION LIST DOCUMENTED IN MEDICAL RECORD: ICD-10-PCS | Mod: CPTII,S$GLB,, | Performed by: PHYSICIAN ASSISTANT

## 2022-02-23 PROCEDURE — 99214 OFFICE O/P EST MOD 30 MIN: CPT | Mod: 25,S$GLB,, | Performed by: PHYSICIAN ASSISTANT

## 2022-02-23 PROCEDURE — U0002: ICD-10-PCS | Mod: QW,S$GLB,, | Performed by: PHYSICIAN ASSISTANT

## 2022-02-23 PROCEDURE — 1159F MED LIST DOCD IN RCRD: CPT | Mod: CPTII,S$GLB,, | Performed by: PHYSICIAN ASSISTANT

## 2022-02-23 PROCEDURE — 3008F BODY MASS INDEX DOCD: CPT | Mod: CPTII,S$GLB,, | Performed by: PHYSICIAN ASSISTANT

## 2022-02-23 PROCEDURE — 1160F RVW MEDS BY RX/DR IN RCRD: CPT | Mod: CPTII,S$GLB,, | Performed by: PHYSICIAN ASSISTANT

## 2022-02-23 PROCEDURE — U0002 COVID-19 LAB TEST NON-CDC: HCPCS | Mod: QW,S$GLB,, | Performed by: PHYSICIAN ASSISTANT

## 2022-02-23 PROCEDURE — 3079F PR MOST RECENT DIASTOLIC BLOOD PRESSURE 80-89 MM HG: ICD-10-PCS | Mod: CPTII,S$GLB,, | Performed by: PHYSICIAN ASSISTANT

## 2022-02-23 RX ORDER — AMOXICILLIN AND CLAVULANATE POTASSIUM 875; 125 MG/1; MG/1
1 TABLET, FILM COATED ORAL 2 TIMES DAILY
Qty: 20 TABLET | Refills: 0 | Status: SHIPPED | OUTPATIENT
Start: 2022-02-23 | End: 2022-03-05

## 2022-02-23 RX ORDER — METHYLPREDNISOLONE ACETATE 40 MG/ML
60 INJECTION, SUSPENSION INTRA-ARTICULAR; INTRALESIONAL; INTRAMUSCULAR; SOFT TISSUE ONCE
Status: COMPLETED | OUTPATIENT
Start: 2022-02-23 | End: 2022-02-23

## 2022-02-23 RX ADMIN — METHYLPREDNISOLONE ACETATE 60 MG: 40 INJECTION, SUSPENSION INTRA-ARTICULAR; INTRALESIONAL; INTRAMUSCULAR; SOFT TISSUE at 08:02

## 2022-02-23 NOTE — PROGRESS NOTES
Subjective:      Patient ID: Simon Goode is a 21 y.o. male.    Chief Complaint: Sore Throat, Nasal Congestion (/), and Chest Congestion (A week )    Sore Throat   This is a new problem. The current episode started in the past 7 days. The problem has been waxing and waning. Neither side of throat is experiencing more pain than the other. There has been no fever. The pain is at a severity of 5/10. The pain is moderate. Associated symptoms include congestion, coughing, diarrhea and a hoarse voice. Pertinent negatives include no abdominal pain, drooling, ear discharge, ear pain, headaches, plugged ear sensation, neck pain, shortness of breath, stridor, swollen glands, trouble swallowing or vomiting. He has had no exposure to strep or mono. He has tried acetaminophen, cool liquids and gargles (multisymptom relief meds otc) for the symptoms. The treatment provided moderate relief.       Review of Systems   Constitutional: Positive for activity change, appetite change, chills and fatigue. Negative for diaphoresis, fever and unexpected weight change.   HENT: Positive for congestion, hoarse voice, postnasal drip, rhinorrhea, sinus pressure and sore throat. Negative for dental problem, drooling, ear discharge, ear pain, facial swelling, hearing loss, mouth sores, nosebleeds, sneezing and trouble swallowing.    Eyes: Negative for pain, discharge, redness, itching and visual disturbance.   Respiratory: Positive for cough. Negative for chest tightness, shortness of breath, wheezing and stridor.    Cardiovascular: Negative for chest pain, palpitations and leg swelling.   Gastrointestinal: Positive for diarrhea. Negative for abdominal pain, constipation, nausea and vomiting.   Endocrine: Negative.    Genitourinary: Negative.  Negative for difficulty urinating.   Musculoskeletal: Positive for myalgias. Negative for neck pain and neck stiffness.   Skin: Negative for rash.   Allergic/Immunologic: Negative for environmental  "allergies, food allergies and immunocompromised state.   Neurological: Negative for dizziness, weakness and headaches.     Objective:   /80 (BP Location: Right arm, Patient Position: Sitting, BP Method: Large (Manual))   Pulse 78   Ht 5' 10" (1.778 m)   Wt 97.8 kg (215 lb 9.8 oz)   SpO2 98%   BMI 30.94 kg/m²     Physical Exam  Vitals and nursing note reviewed.   Constitutional:       General: He is not in acute distress.     Appearance: Normal appearance. He is well-developed. He is not ill-appearing, toxic-appearing or diaphoretic.   HENT:      Head: Normocephalic and atraumatic.      Right Ear: Hearing, tympanic membrane, ear canal and external ear normal. No tenderness.      Left Ear: Hearing, tympanic membrane, ear canal and external ear normal. No tenderness.      Nose: Mucosal edema and rhinorrhea present.      Right Sinus: Maxillary sinus tenderness and frontal sinus tenderness present.      Left Sinus: Maxillary sinus tenderness and frontal sinus tenderness present.      Mouth/Throat:      Mouth: No oral lesions.      Dentition: Normal dentition. No dental caries or dental abscesses.      Pharynx: Uvula midline. Oropharyngeal exudate and posterior oropharyngeal erythema present. No uvula swelling.      Tonsils: No tonsillar exudate or tonsillar abscesses.   Eyes:      General:         Right eye: No discharge.         Left eye: No discharge.      Conjunctiva/sclera: Conjunctivae normal.      Pupils: Pupils are equal, round, and reactive to light.   Cardiovascular:      Rate and Rhythm: Normal rate and regular rhythm.      Heart sounds: Normal heart sounds. No murmur heard.    No friction rub. No gallop.   Pulmonary:      Effort: Pulmonary effort is normal. No respiratory distress.      Breath sounds: Normal breath sounds. No wheezing or rales.   Musculoskeletal:      Cervical back: Normal range of motion and neck supple.   Lymphadenopathy:      Cervical: No cervical adenopathy.   Skin:     General: " Skin is warm.      Coloration: Skin is not pale.      Findings: No erythema or rash.   Neurological:      Mental Status: He is alert and oriented to person, place, and time.   Psychiatric:         Behavior: Behavior normal.         Thought Content: Thought content normal.         Judgment: Judgment normal.       Office Visit on 02/23/2022   Component Date Value Ref Range Status    POC Rapid COVID 02/23/2022 Negative  Negative Final     Acceptable 02/23/2022 Yes   Final         Assessment:     1. Acute sinusitis, recurrence not specified, unspecified location      Plan:   Acute sinusitis, recurrence not specified, unspecified location  -     POCT COVID-19 Rapid Screening  -     methylPREDNISolone acetate injection 60 mg  -     amoxicillin-clavulanate 875-125mg (AUGMENTIN) 875-125 mg per tablet; Take 1 tablet by mouth 2 (two) times daily. for 10 days  Dispense: 20 tablet; Refill: 0    -cont otc symptomatic relief meds    Follow up if symptoms worsen or fail to improve.

## 2022-03-17 DIAGNOSIS — R09.81 NASAL CONGESTION: ICD-10-CM

## 2022-03-17 RX ORDER — FLUTICASONE PROPIONATE 50 MCG
2 SPRAY, SUSPENSION (ML) NASAL DAILY
Qty: 9.9 ML | Refills: 0 | Status: SHIPPED | OUTPATIENT
Start: 2022-03-17 | End: 2022-04-14 | Stop reason: SDUPTHER

## 2022-04-04 ENCOUNTER — PATIENT MESSAGE (OUTPATIENT)
Dept: GASTROENTEROLOGY | Facility: CLINIC | Age: 22
End: 2022-04-04
Payer: COMMERCIAL

## 2022-04-04 DIAGNOSIS — K58.2 IRRITABLE BOWEL SYNDROME WITH BOTH CONSTIPATION AND DIARRHEA: ICD-10-CM

## 2022-04-05 RX ORDER — DICYCLOMINE HYDROCHLORIDE 20 MG/1
20 TABLET ORAL 3 TIMES DAILY PRN
Qty: 90 TABLET | Refills: 11 | Status: SHIPPED | OUTPATIENT
Start: 2022-04-05

## 2022-04-14 DIAGNOSIS — R09.81 NASAL CONGESTION: ICD-10-CM

## 2022-04-14 RX ORDER — FLUTICASONE PROPIONATE 50 MCG
2 SPRAY, SUSPENSION (ML) NASAL DAILY
Qty: 9.9 ML | Refills: 0 | Status: SHIPPED | OUTPATIENT
Start: 2022-04-14

## 2022-05-12 ENCOUNTER — OFFICE VISIT (OUTPATIENT)
Dept: URGENT CARE | Facility: CLINIC | Age: 22
End: 2022-05-12
Payer: COMMERCIAL

## 2022-05-12 VITALS
SYSTOLIC BLOOD PRESSURE: 130 MMHG | HEIGHT: 71 IN | RESPIRATION RATE: 18 BRPM | HEART RATE: 97 BPM | TEMPERATURE: 99 F | OXYGEN SATURATION: 96 % | WEIGHT: 220 LBS | DIASTOLIC BLOOD PRESSURE: 73 MMHG | BODY MASS INDEX: 30.8 KG/M2

## 2022-05-12 DIAGNOSIS — U07.1 COVID-19: Primary | ICD-10-CM

## 2022-05-12 DIAGNOSIS — U07.1 COVID-19 VIRUS DETECTED: ICD-10-CM

## 2022-05-12 PROBLEM — F32.A DEPRESSION: Status: ACTIVE | Noted: 2017-04-20

## 2022-05-12 LAB
CTP QC/QA: YES
SARS-COV-2 RDRP RESP QL NAA+PROBE: POSITIVE

## 2022-05-12 PROCEDURE — 3075F PR MOST RECENT SYSTOLIC BLOOD PRESS GE 130-139MM HG: ICD-10-PCS | Mod: CPTII,S$GLB,, | Performed by: PHYSICIAN ASSISTANT

## 2022-05-12 PROCEDURE — 3008F BODY MASS INDEX DOCD: CPT | Mod: CPTII,S$GLB,, | Performed by: PHYSICIAN ASSISTANT

## 2022-05-12 PROCEDURE — 99203 PR OFFICE/OUTPT VISIT, NEW, LEVL III, 30-44 MIN: ICD-10-PCS | Mod: S$GLB,,, | Performed by: PHYSICIAN ASSISTANT

## 2022-05-12 PROCEDURE — 3078F DIAST BP <80 MM HG: CPT | Mod: CPTII,S$GLB,, | Performed by: PHYSICIAN ASSISTANT

## 2022-05-12 PROCEDURE — U0002: ICD-10-PCS | Mod: QW,S$GLB,, | Performed by: PHYSICIAN ASSISTANT

## 2022-05-12 PROCEDURE — 1159F MED LIST DOCD IN RCRD: CPT | Mod: CPTII,S$GLB,, | Performed by: PHYSICIAN ASSISTANT

## 2022-05-12 PROCEDURE — U0002 COVID-19 LAB TEST NON-CDC: HCPCS | Mod: QW,S$GLB,, | Performed by: PHYSICIAN ASSISTANT

## 2022-05-12 PROCEDURE — 3008F PR BODY MASS INDEX (BMI) DOCUMENTED: ICD-10-PCS | Mod: CPTII,S$GLB,, | Performed by: PHYSICIAN ASSISTANT

## 2022-05-12 PROCEDURE — 3075F SYST BP GE 130 - 139MM HG: CPT | Mod: CPTII,S$GLB,, | Performed by: PHYSICIAN ASSISTANT

## 2022-05-12 PROCEDURE — 1159F PR MEDICATION LIST DOCUMENTED IN MEDICAL RECORD: ICD-10-PCS | Mod: CPTII,S$GLB,, | Performed by: PHYSICIAN ASSISTANT

## 2022-05-12 PROCEDURE — 1160F RVW MEDS BY RX/DR IN RCRD: CPT | Mod: CPTII,S$GLB,, | Performed by: PHYSICIAN ASSISTANT

## 2022-05-12 PROCEDURE — 3078F PR MOST RECENT DIASTOLIC BLOOD PRESSURE < 80 MM HG: ICD-10-PCS | Mod: CPTII,S$GLB,, | Performed by: PHYSICIAN ASSISTANT

## 2022-05-12 PROCEDURE — 99203 OFFICE O/P NEW LOW 30 MIN: CPT | Mod: S$GLB,,, | Performed by: PHYSICIAN ASSISTANT

## 2022-05-12 PROCEDURE — 1160F PR REVIEW ALL MEDS BY PRESCRIBER/CLIN PHARMACIST DOCUMENTED: ICD-10-PCS | Mod: CPTII,S$GLB,, | Performed by: PHYSICIAN ASSISTANT

## 2022-05-12 NOTE — PROGRESS NOTES
"Subjective:       Patient ID: Simon Goode is a 21 y.o. male.    Vitals:  height is 5' 11" (1.803 m) and weight is 99.8 kg (220 lb). His tympanic temperature is 98.8 °F (37.1 °C). His blood pressure is 130/73 and his pulse is 97. His respiration is 18 and oxygen saturation is 96%.     Chief Complaint: Sore Throat    Pt is a 21 year old male who presents today with a sore throat and runny nose for appx 3 days. Pt states that he was recently in contact with someone who is covid positive.  Patient has mild associated cough without production.  Patient denies any fevers, chills, chest pain, shortness of breath, nausea, vomiting, diarrhea.  Pt has been vaccinated since 3/23/21.  Patient has not tried anything for the symptoms.    Sore Throat   This is a new problem. The current episode started in the past 7 days. The problem has been gradually worsening. Neither side of throat is experiencing more pain than the other. There has been no fever. The pain is at a severity of 2/10. The pain is mild. Associated symptoms include congestion and coughing. Pertinent negatives include no abdominal pain, diarrhea, drooling, ear discharge, ear pain, headaches, hoarse voice, plugged ear sensation, neck pain, shortness of breath, stridor, swollen glands, trouble swallowing or vomiting. He has had no exposure to strep or mono. He has tried nothing for the symptoms.       Constitution: Negative for chills and fever.   HENT: Positive for congestion, postnasal drip and sore throat. Negative for ear pain, ear discharge, drooling, sinus pain, sinus pressure and trouble swallowing.    Neck: Negative for neck pain and painful lymph nodes.   Cardiovascular: Negative for chest pain.   Respiratory: Positive for cough. Negative for sputum production, shortness of breath and stridor.    Gastrointestinal: Negative for abdominal pain, nausea, vomiting and diarrhea.   Musculoskeletal: Negative for muscle ache.   Neurological: Negative for headaches. "   Hematologic/Lymphatic: Negative for swollen lymph nodes.       Objective:      Physical Exam   Constitutional: He is oriented to person, place, and time. He appears well-developed. He is cooperative.  Non-toxic appearance. He does not appear ill. No distress.   HENT:   Head: Normocephalic and atraumatic.   Ears:   Right Ear: Hearing and external ear normal.   Left Ear: Hearing and external ear normal.   Nose: Congestion present. No mucosal edema, rhinorrhea or nasal deformity. No epistaxis. Right sinus exhibits no maxillary sinus tenderness and no frontal sinus tenderness. Left sinus exhibits no maxillary sinus tenderness and no frontal sinus tenderness.   Mouth/Throat: Uvula is midline, oropharynx is clear and moist and mucous membranes are normal. No trismus in the jaw. Normal dentition. No uvula swelling. No oropharyngeal exudate, posterior oropharyngeal edema or posterior oropharyngeal erythema.   Eyes: Conjunctivae and lids are normal. No scleral icterus.   Neck: Trachea normal and phonation normal. Neck supple. No edema present. No erythema present. No neck rigidity present.   Cardiovascular: Normal rate, regular rhythm, normal heart sounds and normal pulses.   No murmur heard.  Pulmonary/Chest: Effort normal and breath sounds normal. No respiratory distress. He has no decreased breath sounds. He has no wheezes. He has no rhonchi.   Abdominal: Normal appearance.   Musculoskeletal: Normal range of motion.         General: No deformity. Normal range of motion.   Neurological: He is alert and oriented to person, place, and time. He exhibits normal muscle tone. Coordination normal.   Skin: Skin is warm, dry, intact, not diaphoretic and not pale.   Psychiatric: His speech is normal and behavior is normal. Judgment and thought content normal.   Nursing note and vitals reviewed.    Results for orders placed or performed in visit on 05/12/22   POCT COVID-19 Rapid Screening   Result Value Ref Range    POC Rapid COVID  Positive (A) Negative     Acceptable Yes      COVID RISK SCORE:   2          Assessment:       1. COVID-19          Plan:         COVID-19  -     POCT COVID-19 Rapid Screening    Discussed results with patient and proper quarantine based on CDC guidelines.   Discussed use of OTC medications for symptom control as this is a viral disease.   All ER precautions covered including but not limited to shortness of breath, intractable fever, or chest pain.  Discussed RTC if symptoms worsen, change, or persist.     Patient verbalized understanding and agreed with the plan.     Briseyda Sutton PA-C    Patient Instructions   Your test was POSITIVE for COVID-19 (coronavirus).       Please isolate yourself at home.  You may leave home and/or return to work once the following conditions are met:    If you were not hospitalized and are not severely immunocompromised*:   More than 10 days since symptoms first appeared AND   More than 24 hours fever free without medications AND   Symptoms have improved     If you were hospitalized OR are severely immunocompromised*:   More than 20 days since symptoms first appeared   More than 24 hours fever free without medications   Symptoms have improved    If you had no symptoms but tested positive:   More than 10 days since the date of the first positive test (20 days if severely immunocompromised).   If you develop symptoms, then use the guidelines above.     *Definition of severely immunocompromised:  - Current chemotherapy for cancer  - Untreated HIV with CD4 count less than 200  - Combined primary immunodeficiency disorder  - Prednisone more than 20 mg per day for more than 14 days  - Post-transplant patients    Additional instructions:   Separate yourself from other people and animals in your home.   Call ahead before visiting your doctor.   Wear a facemask when around others.   Cover your coughs and sneezes.   Wash your hands often with soap and water; hand   can be used, too.   Avoid sharing personal household items.   Wipe down surfaces used daily.   Monitor your symptoms. Seek prompt medical attention if your illness is worsening (e.g., difficulty breathing).    Before seeking care, call your healthcare provider.   If you have a medical emergency and need to call 911, notify the dispatch personnel that you have, or are being evaluated for COVID-19. If possible, put on a facemask before emergency medical services arrive.      Contact Tracing    As one of the next steps, you will receive a call or text from the [place] [facility] ([facility]) COVID-19 Tracing Team. See the contact information below so you know not to ignore the health departments call. It is important that you contact them back immediately so they can help.      Contact Tracer Number:  [phone]  Caller ID for most carriers: [facility]     What is contact tracing?  · Contact tracing is a process that helps identify everyone who has been in close contact with an infected person. Contact tracers let those people know they may have been exposed and guide them on next steps. Confidentiality is important for everyone; no one will be told who may have exposed them to the virus.  · Your involvement is important. The more we know about where and how this virus is spreading, the better chance we have at stopping it from spreading further.  What does exposure mean?  · Exposure means you have been within 6 feet for more than 15 minutes with a person who has or had COVID-19.  What kind of questions do the contact tracers ask?  · A contact tracer will confirm your basic contact information including name, address, phone number, and next of kin, as well as asking about any symptoms you may have had. Theyll also ask you how you think you may have gotten sick, such as places where you may have been exposed to the virus, and people you were with. Those names will never be shared with  anyone outside of that call, and will only be used to help trace and stop the spread of the virus.   I have privacy concerns. How will the state use my information?  · Your privacy about your health is important. All calls are completed using call centers that use the appropriate health privacy protection measures (HIPAA compliance), meaning that your patient information is safe. No one will ever ask you any questions related to immigration status. Your health comes first.   Do I have to participate?  · You do not have to participate, but we strongly encourage you to. Contact tracing can help us catch and control new outbreaks as theyre developing to keep your friends and family safe.   What if I dont hear from anyone?  · If you dont receive a call within 24 hours, you can call the number above right away to inquire about your status. That line is open from 8:00 am - 8:00 p.m., 7 days a week.  Contact tracing saves lives! Together, we have the power to beat this virus and keep our loved ones and neighbors safe.    For more information see CDC link below.      https://www.cdc.gov/coronavirus/2019-ncov/hcp/guidance-prevent-spread.html#precautions        Sources:  [facility], [place] [facility]           Sincerely,     Briseyda Sutton PA-C    [facility] COVID QUARANTINE     Quarantine  Quarantine if you have been in close contact (within 6 feet of someone for a cumulative total of 15 minutes or more over a 24-hour period) with someone who has COVID-19, unless you have been fully vaccinated or had a positive test within 90 days and are no longer symptomatic.  People who are fully vaccinated and/or had a positive test within 90 days do NOT need to quarantine after contact with someone who had COVID-19 unless they have symptoms. However, fully vaccinated people who have not had a positive test within 90 days, should get tested 3-5 days after their exposure, even if they don't have symptoms and wear a mask  indoors in public for 14 days following exposure or until their test result is negative.    If you have not been vaccinated, and don't have a positive test within 90 days, your quarantine is 10 days without a test, or you can choose to test out of quarantine.   After 5-7 days without symptoms, you can test at that point and you can come out of quarantine on day 8 if negative and still without symptoms.   The risk is that if you test without symptoms on Day 6 (for example) and you are positive, your 10 day quarantine begins on that day, and you turned your 7 day quarantine into 16 days.

## 2022-05-12 NOTE — PATIENT INSTRUCTIONS
Your test was POSITIVE for COVID-19 (coronavirus).       Please isolate yourself at home.  You may leave home and/or return to work once the following conditions are met:    If you were not hospitalized and are not severely immunocompromised*:  More than 10 days since symptoms first appeared AND  More than 24 hours fever free without medications AND  Symptoms have improved     If you were hospitalized OR are severely immunocompromised*:  More than 20 days since symptoms first appeared  More than 24 hours fever free without medications  Symptoms have improved    If you had no symptoms but tested positive:  More than 10 days since the date of the first positive test (20 days if severely immunocompromised).   If you develop symptoms, then use the guidelines above.     *Definition of severely immunocompromised:  Current chemotherapy for cancer  Untreated HIV with CD4 count less than 200  Combined primary immunodeficiency disorder  Prednisone more than 20 mg per day for more than 14 days  Post-transplant patients    Additional instructions:  Separate yourself from other people and animals in your home.  Call ahead before visiting your doctor.  Wear a facemask when around others.  Cover your coughs and sneezes.  Wash your hands often with soap and water; hand  can be used, too.  Avoid sharing personal household items.  Wipe down surfaces used daily.  Monitor your symptoms. Seek prompt medical attention if your illness is worsening (e.g., difficulty breathing).   Before seeking care, call your healthcare provider.  If you have a medical emergency and need to call 911, notify the dispatch personnel that you have, or are being evaluated for COVID-19. If possible, put on a facemask before emergency medical services arrive.      Contact Tracing    As one of the next steps, you will receive a call or text from the [place] [facility] ([facility]) COVID-19 Tracing Team. See the contact information below so you know  not to ignore the health departments call. It is important that you contact them back immediately so they can help.      Contact Tracer Number:  [phone]  Caller ID for most carriers: [place] [facility]     What is contact tracing?  Contact tracing is a process that helps identify everyone who has been in close contact with an infected person. Contact tracers let those people know they may have been exposed and guide them on next steps. Confidentiality is important for everyone; no one will be told who may have exposed them to the virus.  Your involvement is important. The more we know about where and how this virus is spreading, the better chance we have at stopping it from spreading further.  What does exposure mean?  Exposure means you have been within 6 feet for more than 15 minutes with a person who has or had COVID-19.  What kind of questions do the contact tracers ask?  A contact tracer will confirm your basic contact information including name, address, phone number, and next of kin, as well as asking about any symptoms you may have had. Theyll also ask you how you think you may have gotten sick, such as places where you may have been exposed to the virus, and people you were with. Those names will never be shared with anyone outside of that call, and will only be used to help trace and stop the spread of the virus.   I have privacy concerns. How will the state use my information?  Your privacy about your health is important. All calls are completed using call centers that use the appropriate health privacy protection measures (HIPAA compliance), meaning that your patient information is safe. No one will ever ask you any questions related to immigration status. Your health comes first.   Do I have to participate?  You do not have to participate, but we strongly encourage you to. Contact tracing can help us catch and control new outbreaks as theyre developing to keep your friends and family safe.   What  if I dont hear from anyone?  If you dont receive a call within 24 hours, you can call the number above right away to inquire about your status. That line is open from 8:00 am - 8:00 p.m., 7 days a week.  Contact tracing saves lives! Together, we have the power to beat this virus and keep our loved ones and neighbors safe.    For more information see CDC link below.      https://www.cdc.gov/coronavirus/2019-ncov/hcp/guidance-prevent-spread.html#precautions        Sources:  [facility], [place] [facility] and [facility]           Sincerely,     Briseyda Sutton PA-C    [facility] COVID QUARANTINE     Quarantine  Quarantine if you have been in close contact (within 6 feet of someone for a cumulative total of 15 minutes or more over a 24-hour period) with someone who has COVID-19, unless you have been fully vaccinated or had a positive test within 90 days and are no longer symptomatic.  People who are fully vaccinated and/or had a positive test within 90 days do NOT need to quarantine after contact with someone who had COVID-19 unless they have symptoms. However, fully vaccinated people who have not had a positive test within 90 days, should get tested 3-5 days after their exposure, even if they don't have symptoms and wear a mask indoors in public for 14 days following exposure or until their test result is negative.    If you have not been vaccinated, and don't have a positive test within 90 days, your quarantine is 10 days without a test, or you can choose to test out of quarantine.   After 5-7 days without symptoms, you can test at that point and you can come out of quarantine on day 8 if negative and still without symptoms.   The risk is that if you test without symptoms on Day 6 (for example) and you are positive, your 10 day quarantine begins on that day, and you turned your 7 day quarantine into 16 days.

## 2022-05-12 NOTE — LETTER
[id number] [facility] [address] ? [place], [address] ? Phone [phone] ? Fax             Return to Work/School    Patient: Simon Goode  YOB: 2000   Date: 05/12/2022      To Whom It May Concern:     Simon Goode was in contact with/seen in my office on 05/12/2022. COVID-19 is present in our communities across the state. Not all patients are eligible or appropriate to be tested. In this situation, your employee meets the following criteria:     Simon Goode has met the criteria for COVID-19 testing and has a POSITIVE result. He can return to work once they are asymptomatic for 24 hours without the use of fever reducing medications AND at least five days from the start of symptoms (or from the first positive result if they have no symptoms).      If you have any questions or concerns, or if I can be of further assistance, please do not hesitate to contact me.     Sincerely,    Briseyda Sutton PA-C

## 2022-05-15 ENCOUNTER — TELEPHONE (OUTPATIENT)
Dept: URGENT CARE | Facility: CLINIC | Age: 22
End: 2022-05-15
Payer: COMMERCIAL

## 2022-05-18 ENCOUNTER — PATIENT MESSAGE (OUTPATIENT)
Dept: RESEARCH | Facility: HOSPITAL | Age: 22
End: 2022-05-18
Payer: COMMERCIAL

## 2022-06-22 ENCOUNTER — PATIENT MESSAGE (OUTPATIENT)
Dept: RESEARCH | Facility: HOSPITAL | Age: 22
End: 2022-06-22
Payer: COMMERCIAL

## 2022-08-18 RX ORDER — FLUOXETINE HYDROCHLORIDE 40 MG/1
40 CAPSULE ORAL DAILY
COMMUNITY
End: 2022-10-26 | Stop reason: SDUPTHER

## 2022-08-22 ENCOUNTER — OFFICE VISIT (OUTPATIENT)
Dept: INTERNAL MEDICINE | Facility: CLINIC | Age: 22
End: 2022-08-22
Payer: COMMERCIAL

## 2022-08-22 VITALS
TEMPERATURE: 98 F | SYSTOLIC BLOOD PRESSURE: 138 MMHG | BODY MASS INDEX: 33.08 KG/M2 | WEIGHT: 236.31 LBS | HEART RATE: 103 BPM | HEIGHT: 71 IN | OXYGEN SATURATION: 96 % | DIASTOLIC BLOOD PRESSURE: 70 MMHG

## 2022-08-22 DIAGNOSIS — J06.9 UPPER RESPIRATORY TRACT INFECTION, UNSPECIFIED TYPE: Primary | ICD-10-CM

## 2022-08-22 DIAGNOSIS — F98.8 ATTENTION DEFICIT DISORDER, UNSPECIFIED HYPERACTIVITY PRESENCE: ICD-10-CM

## 2022-08-22 DIAGNOSIS — F32.1 CURRENT MODERATE EPISODE OF MAJOR DEPRESSIVE DISORDER WITHOUT PRIOR EPISODE: ICD-10-CM

## 2022-08-22 LAB
CTP QC/QA: YES
POC MOLECULAR INFLUENZA A AGN: NEGATIVE
POC MOLECULAR INFLUENZA B AGN: NEGATIVE

## 2022-08-22 PROCEDURE — 3008F PR BODY MASS INDEX (BMI) DOCUMENTED: ICD-10-PCS | Mod: CPTII,S$GLB,, | Performed by: FAMILY MEDICINE

## 2022-08-22 PROCEDURE — 99214 PR OFFICE/OUTPT VISIT, EST, LEVL IV, 30-39 MIN: ICD-10-PCS | Mod: 25,S$GLB,, | Performed by: FAMILY MEDICINE

## 2022-08-22 PROCEDURE — 3008F BODY MASS INDEX DOCD: CPT | Mod: CPTII,S$GLB,, | Performed by: FAMILY MEDICINE

## 2022-08-22 PROCEDURE — 1159F MED LIST DOCD IN RCRD: CPT | Mod: CPTII,S$GLB,, | Performed by: FAMILY MEDICINE

## 2022-08-22 PROCEDURE — 3078F PR MOST RECENT DIASTOLIC BLOOD PRESSURE < 80 MM HG: ICD-10-PCS | Mod: CPTII,S$GLB,, | Performed by: FAMILY MEDICINE

## 2022-08-22 PROCEDURE — 1159F PR MEDICATION LIST DOCUMENTED IN MEDICAL RECORD: ICD-10-PCS | Mod: CPTII,S$GLB,, | Performed by: FAMILY MEDICINE

## 2022-08-22 PROCEDURE — 99214 OFFICE O/P EST MOD 30 MIN: CPT | Mod: 25,S$GLB,, | Performed by: FAMILY MEDICINE

## 2022-08-22 PROCEDURE — 3078F DIAST BP <80 MM HG: CPT | Mod: CPTII,S$GLB,, | Performed by: FAMILY MEDICINE

## 2022-08-22 PROCEDURE — 3075F PR MOST RECENT SYSTOLIC BLOOD PRESS GE 130-139MM HG: ICD-10-PCS | Mod: CPTII,S$GLB,, | Performed by: FAMILY MEDICINE

## 2022-08-22 PROCEDURE — 99999 PR PBB SHADOW E&M-EST. PATIENT-LVL III: ICD-10-PCS | Mod: PBBFAC,,, | Performed by: FAMILY MEDICINE

## 2022-08-22 PROCEDURE — 3075F SYST BP GE 130 - 139MM HG: CPT | Mod: CPTII,S$GLB,, | Performed by: FAMILY MEDICINE

## 2022-08-22 PROCEDURE — 87502 INFLUENZA DNA AMP PROBE: CPT | Mod: QW,S$GLB,, | Performed by: FAMILY MEDICINE

## 2022-08-22 PROCEDURE — 99999 PR PBB SHADOW E&M-EST. PATIENT-LVL III: CPT | Mod: PBBFAC,,, | Performed by: FAMILY MEDICINE

## 2022-08-22 PROCEDURE — 96372 PR INJECTION,THERAP/PROPH/DIAG2ST, IM OR SUBCUT: ICD-10-PCS | Mod: S$GLB,,, | Performed by: FAMILY MEDICINE

## 2022-08-22 PROCEDURE — 1160F RVW MEDS BY RX/DR IN RCRD: CPT | Mod: CPTII,S$GLB,, | Performed by: FAMILY MEDICINE

## 2022-08-22 PROCEDURE — 1160F PR REVIEW ALL MEDS BY PRESCRIBER/CLIN PHARMACIST DOCUMENTED: ICD-10-PCS | Mod: CPTII,S$GLB,, | Performed by: FAMILY MEDICINE

## 2022-08-22 PROCEDURE — 96372 THER/PROPH/DIAG INJ SC/IM: CPT | Mod: S$GLB,,, | Performed by: FAMILY MEDICINE

## 2022-08-22 PROCEDURE — 87502 POCT INFLUENZA A/B MOLECULAR: ICD-10-PCS | Mod: QW,S$GLB,, | Performed by: FAMILY MEDICINE

## 2022-08-22 RX ORDER — BETAMETHASONE SODIUM PHOSPHATE AND BETAMETHASONE ACETATE 3; 3 MG/ML; MG/ML
6 INJECTION, SUSPENSION INTRA-ARTICULAR; INTRALESIONAL; INTRAMUSCULAR; SOFT TISSUE
Status: COMPLETED | OUTPATIENT
Start: 2022-08-22 | End: 2022-08-22

## 2022-08-22 RX ADMIN — BETAMETHASONE SODIUM PHOSPHATE AND BETAMETHASONE ACETATE 6 MG: 3; 3 INJECTION, SUSPENSION INTRA-ARTICULAR; INTRALESIONAL; INTRAMUSCULAR; SOFT TISSUE at 05:08

## 2022-08-22 NOTE — PROGRESS NOTES
Subjective:       Patient ID: Simon Goode is a 22 y.o. male.    Chief Complaint: Cough    URI   This is a new problem. The current episode started in the past 7 days. The problem has been gradually worsening. There has been no fever. Associated symptoms include headaches, rhinorrhea and sinus pain. Pertinent negatives include no abdominal pain or chest pain. He has tried nothing for the symptoms.     Review of Systems   HENT: Positive for rhinorrhea and sinus pain.    Respiratory: Negative for shortness of breath.    Cardiovascular: Negative for chest pain.   Gastrointestinal: Negative for abdominal pain.   Neurological: Positive for headaches.       Objective:      Physical Exam  Vitals and nursing note reviewed.   Constitutional:       General: He is not in acute distress.     Appearance: Normal appearance. He is well-developed. He is not diaphoretic.   HENT:      Head: Normocephalic and atraumatic.   Pulmonary:      Effort: Pulmonary effort is normal. No respiratory distress.      Breath sounds: Normal breath sounds. No wheezing.   Skin:     General: Skin is warm and dry.      Findings: No erythema or rash.   Neurological:      Mental Status: He is alert.         Assessment:       1. Upper respiratory tract infection, unspecified type    2. Current moderate episode of major depressive disorder without prior episode    3. Attention deficit disorder, unspecified hyperactivity presence        Plan:     Problem List Items Addressed This Visit        Psychiatric    Current moderate episode of major depressive disorder without prior episode    Overview     Symptoms began in middle school. No h/o psyc hospitalization.            Current Assessment & Plan     Chronic, Stable, cont  prozac           RESOLVED: Attention deficit disorder    Overview     Diagnosed in middle school. Reports h/o seeing psychology or psychiatry.               ENT    Upper respiratory tract infection - Primary    Relevant Medications     betamethasone acetate-betamethasone sodium phosphate injection 6 mg (Start on 8/22/2022  5:45 PM)    Other Relevant Orders    COVID-19 Routine Screening    POCT Influenza A/B Molecular (Completed)    POCT COVID-19 Rapid Screening

## 2022-10-26 ENCOUNTER — PATIENT MESSAGE (OUTPATIENT)
Dept: INTERNAL MEDICINE | Facility: CLINIC | Age: 22
End: 2022-10-26
Payer: COMMERCIAL

## 2022-10-26 RX ORDER — FLUOXETINE HYDROCHLORIDE 40 MG/1
40 CAPSULE ORAL DAILY
Qty: 30 CAPSULE | Refills: 0 | Status: SHIPPED | OUTPATIENT
Start: 2022-10-26 | End: 2022-11-22 | Stop reason: SDUPTHER

## 2022-10-26 NOTE — TELEPHONE ENCOUNTER
No new care gaps identified.  [facility] Embedded Care Gaps. Reference number: [id number]. 10/26/2022   2:36:38 PM CDT

## 2022-11-22 RX ORDER — FLUOXETINE HYDROCHLORIDE 40 MG/1
40 CAPSULE ORAL DAILY
Qty: 30 CAPSULE | Refills: 0 | Status: SHIPPED | OUTPATIENT
Start: 2022-11-22 | End: 2022-11-29 | Stop reason: SDUPTHER

## 2022-11-22 NOTE — TELEPHONE ENCOUNTER
No new care gaps identified.  [facility] Embedded Care Gaps. Reference number: [id number]. 11/22/2022   7:42:01 AM CST

## 2022-11-29 ENCOUNTER — LAB VISIT (OUTPATIENT)
Dept: LAB | Facility: HOSPITAL | Age: 22
End: 2022-11-29
Attending: INTERNAL MEDICINE
Payer: COMMERCIAL

## 2022-11-29 ENCOUNTER — OFFICE VISIT (OUTPATIENT)
Dept: INTERNAL MEDICINE | Facility: CLINIC | Age: 22
End: 2022-11-29
Payer: COMMERCIAL

## 2022-11-29 VITALS
BODY MASS INDEX: 33.61 KG/M2 | WEIGHT: 240.06 LBS | TEMPERATURE: 98 F | HEIGHT: 71 IN | OXYGEN SATURATION: 97 % | DIASTOLIC BLOOD PRESSURE: 60 MMHG | HEART RATE: 87 BPM | SYSTOLIC BLOOD PRESSURE: 128 MMHG

## 2022-11-29 DIAGNOSIS — F32.1 CURRENT MODERATE EPISODE OF MAJOR DEPRESSIVE DISORDER WITHOUT PRIOR EPISODE: ICD-10-CM

## 2022-11-29 DIAGNOSIS — Z00.00 ROUTINE GENERAL MEDICAL EXAMINATION AT A HEALTH CARE FACILITY: Primary | ICD-10-CM

## 2022-11-29 DIAGNOSIS — Z00.00 ROUTINE GENERAL MEDICAL EXAMINATION AT A HEALTH CARE FACILITY: ICD-10-CM

## 2022-11-29 PROBLEM — J06.9 UPPER RESPIRATORY TRACT INFECTION: Status: RESOLVED | Noted: 2022-08-22 | Resolved: 2022-11-29

## 2022-11-29 LAB
ALBUMIN SERPL BCP-MCNC: 4.1 G/DL (ref 3.5–5.2)
ALP SERPL-CCNC: 51 U/L (ref 55–135)
ALT SERPL W/O P-5'-P-CCNC: 50 U/L (ref 10–44)
ANION GAP SERPL CALC-SCNC: 8 MMOL/L (ref 8–16)
AST SERPL-CCNC: 25 U/L (ref 10–40)
BASOPHILS # BLD AUTO: 0.01 K/UL (ref 0–0.2)
BASOPHILS NFR BLD: 0.2 % (ref 0–1.9)
BILIRUB SERPL-MCNC: 0.5 MG/DL (ref 0.1–1)
BUN SERPL-MCNC: 17 MG/DL (ref 6–20)
CALCIUM SERPL-MCNC: 9.8 MG/DL (ref 8.7–10.5)
CHLORIDE SERPL-SCNC: 106 MMOL/L (ref 95–110)
CO2 SERPL-SCNC: 26 MMOL/L (ref 23–29)
CREAT SERPL-MCNC: 0.9 MG/DL (ref 0.5–1.4)
DIFFERENTIAL METHOD: NORMAL
EOSINOPHIL # BLD AUTO: 0.1 K/UL (ref 0–0.5)
EOSINOPHIL NFR BLD: 1.5 % (ref 0–8)
ERYTHROCYTE [DISTWIDTH] IN BLOOD BY AUTOMATED COUNT: 12.1 % (ref 11.5–14.5)
EST. GFR  (NO RACE VARIABLE): >60 ML/MIN/1.73 M^2
GLUCOSE SERPL-MCNC: 93 MG/DL (ref 70–110)
HCT VFR BLD AUTO: 42.8 % (ref 40–54)
HGB BLD-MCNC: 14.6 G/DL (ref 14–18)
IMM GRANULOCYTES # BLD AUTO: 0.01 K/UL (ref 0–0.04)
IMM GRANULOCYTES NFR BLD AUTO: 0.2 % (ref 0–0.5)
LYMPHOCYTES # BLD AUTO: 1.9 K/UL (ref 1–4.8)
LYMPHOCYTES NFR BLD: 42.3 % (ref 18–48)
MCH RBC QN AUTO: 30.4 PG (ref 27–31)
MCHC RBC AUTO-ENTMCNC: 34.1 G/DL (ref 32–36)
MCV RBC AUTO: 89 FL (ref 82–98)
MONOCYTES # BLD AUTO: 0.6 K/UL (ref 0.3–1)
MONOCYTES NFR BLD: 12 % (ref 4–15)
NEUTROPHILS # BLD AUTO: 2 K/UL (ref 1.8–7.7)
NEUTROPHILS NFR BLD: 43.8 % (ref 38–73)
NRBC BLD-RTO: 0 /100 WBC
PLATELET # BLD AUTO: 217 K/UL (ref 150–450)
PMV BLD AUTO: 12 FL (ref 9.2–12.9)
POTASSIUM SERPL-SCNC: 4.4 MMOL/L (ref 3.5–5.1)
PROT SERPL-MCNC: 6.8 G/DL (ref 6–8.4)
RBC # BLD AUTO: 4.81 M/UL (ref 4.6–6.2)
SODIUM SERPL-SCNC: 140 MMOL/L (ref 136–145)
WBC # BLD AUTO: 4.59 K/UL (ref 3.9–12.7)

## 2022-11-29 PROCEDURE — 85025 COMPLETE CBC W/AUTO DIFF WBC: CPT | Performed by: INTERNAL MEDICINE

## 2022-11-29 PROCEDURE — 90471 IMMUNIZATION ADMIN: CPT | Mod: S$GLB,,, | Performed by: INTERNAL MEDICINE

## 2022-11-29 PROCEDURE — 90686 FLU VACCINE (QUAD) GREATER THAN OR EQUAL TO 3YO PRESERVATIVE FREE IM: ICD-10-PCS | Mod: S$GLB,,, | Performed by: INTERNAL MEDICINE

## 2022-11-29 PROCEDURE — 3074F PR MOST RECENT SYSTOLIC BLOOD PRESSURE < 130 MM HG: ICD-10-PCS | Mod: CPTII,S$GLB,, | Performed by: INTERNAL MEDICINE

## 2022-11-29 PROCEDURE — 3078F PR MOST RECENT DIASTOLIC BLOOD PRESSURE < 80 MM HG: ICD-10-PCS | Mod: CPTII,S$GLB,, | Performed by: INTERNAL MEDICINE

## 2022-11-29 PROCEDURE — 99395 PR PREVENTIVE VISIT,EST,18-39: ICD-10-PCS | Mod: 25,S$GLB,, | Performed by: INTERNAL MEDICINE

## 2022-11-29 PROCEDURE — 90686 IIV4 VACC NO PRSV 0.5 ML IM: CPT | Mod: S$GLB,,, | Performed by: INTERNAL MEDICINE

## 2022-11-29 PROCEDURE — 3078F DIAST BP <80 MM HG: CPT | Mod: CPTII,S$GLB,, | Performed by: INTERNAL MEDICINE

## 2022-11-29 PROCEDURE — 90471 FLU VACCINE (QUAD) GREATER THAN OR EQUAL TO 3YO PRESERVATIVE FREE IM: ICD-10-PCS | Mod: S$GLB,,, | Performed by: INTERNAL MEDICINE

## 2022-11-29 PROCEDURE — 3008F BODY MASS INDEX DOCD: CPT | Mod: CPTII,S$GLB,, | Performed by: INTERNAL MEDICINE

## 2022-11-29 PROCEDURE — 1159F MED LIST DOCD IN RCRD: CPT | Mod: CPTII,S$GLB,, | Performed by: INTERNAL MEDICINE

## 2022-11-29 PROCEDURE — 84443 ASSAY THYROID STIM HORMONE: CPT | Performed by: INTERNAL MEDICINE

## 2022-11-29 PROCEDURE — 1159F PR MEDICATION LIST DOCUMENTED IN MEDICAL RECORD: ICD-10-PCS | Mod: CPTII,S$GLB,, | Performed by: INTERNAL MEDICINE

## 2022-11-29 PROCEDURE — 99395 PREV VISIT EST AGE 18-39: CPT | Mod: 25,S$GLB,, | Performed by: INTERNAL MEDICINE

## 2022-11-29 PROCEDURE — 3008F PR BODY MASS INDEX (BMI) DOCUMENTED: ICD-10-PCS | Mod: CPTII,S$GLB,, | Performed by: INTERNAL MEDICINE

## 2022-11-29 PROCEDURE — 36415 COLL VENOUS BLD VENIPUNCTURE: CPT | Performed by: INTERNAL MEDICINE

## 2022-11-29 PROCEDURE — 99999 PR PBB SHADOW E&M-EST. PATIENT-LVL IV: ICD-10-PCS | Mod: PBBFAC,,, | Performed by: INTERNAL MEDICINE

## 2022-11-29 PROCEDURE — 99999 PR PBB SHADOW E&M-EST. PATIENT-LVL IV: CPT | Mod: PBBFAC,,, | Performed by: INTERNAL MEDICINE

## 2022-11-29 PROCEDURE — 3074F SYST BP LT 130 MM HG: CPT | Mod: CPTII,S$GLB,, | Performed by: INTERNAL MEDICINE

## 2022-11-29 PROCEDURE — 80053 COMPREHEN METABOLIC PANEL: CPT | Performed by: INTERNAL MEDICINE

## 2022-11-29 RX ORDER — FLUOXETINE HYDROCHLORIDE 40 MG/1
40 CAPSULE ORAL DAILY
Qty: 90 CAPSULE | Refills: 1 | Status: SHIPPED | OUTPATIENT
Start: 2022-11-29 | End: 2023-04-26 | Stop reason: SDUPTHER

## 2022-11-29 NOTE — PROGRESS NOTES
"Subjective:      Patient ID: Simon Goode is a 22 y.o. male.    Chief Complaint: Annual Exam      HPI      22 y.o. with  Patient Active Problem List   Diagnosis    Current moderate episode of major depressive disorder without prior episode    Depression     Past Medical History:   Diagnosis Date    ADHD (attention deficit hyperactivity disorder)     Attention deficit disorder 11/8/2019    Diagnosed in middle school. Reports h/o seeing psychology or psychiatry.     Chronic constipation     Chronic diarrhea     Depression     GERD (gastroesophageal reflux disease)        Here today for annual prev exam.  Compliant with meds without significant side effects. Energy and appetite are good.         History reviewed. No pertinent surgical history.  Social History     Socioeconomic History    Marital status: Single   Tobacco Use    Smoking status: Never    Smokeless tobacco: Never   Substance and Sexual Activity    Alcohol use: Not Currently     Alcohol/week: 1.0 standard drink     Types: 1 Glasses of wine per week    Drug use: Not Currently     Frequency: 1.0 times per week     Types: Marijuana    Sexual activity: Not Currently     Partners: Female, Male   Social History Narrative    Brother vapes in the household. 1 cat in the household.     family history includes Cancer in his paternal grandfather; Depression in his brother and mother; No Known Problems in his father.    Review of Systems   Constitutional:  Negative for chills and fever.   HENT:  Negative for ear pain and sore throat.    Respiratory:  Negative for cough.    Cardiovascular:  Negative for chest pain.   Gastrointestinal:  Negative for abdominal pain and blood in stool.   Genitourinary:  Negative for dysuria and hematuria.   Neurological:  Negative for seizures and syncope.   Objective:   /60 (BP Location: Left arm, Patient Position: Sitting, BP Method: Large (Manual))   Pulse 87   Temp 98.4 °F (36.9 °C) (Tympanic)   Ht 5' 11" (1.803 m)   Wt 108.9 " kg (240 lb 1.3 oz)   SpO2 97%   BMI 33.48 kg/m²     Physical Exam  Constitutional:       General: He is not in acute distress.     Appearance: He is well-developed.   HENT:      Head: Normocephalic and atraumatic.   Eyes:      Extraocular Movements: Extraocular movements intact.   Neck:      Thyroid: No thyromegaly.   Cardiovascular:      Rate and Rhythm: Normal rate and regular rhythm.   Pulmonary:      Breath sounds: Normal breath sounds. No wheezing or rales.   Abdominal:      General: Bowel sounds are normal.      Palpations: Abdomen is soft.      Tenderness: There is no abdominal tenderness.   Musculoskeletal:         General: No swelling.      Cervical back: Neck supple. No rigidity.   Lymphadenopathy:      Cervical: No cervical adenopathy.   Skin:     General: Skin is warm and dry.   Neurological:      Mental Status: He is alert and oriented to person, place, and time.   Psychiatric:         Behavior: Behavior normal.       Lab Results   Component Value Date    WBC 4.59 11/29/2022    HGB 14.6 11/29/2022    HGB 16.9 09/16/2021    HGB 16.4 01/19/2021    HCT 42.8 11/29/2022    MCV 89 11/29/2022    MCV 92 09/16/2021    MCV 91 01/19/2021     11/29/2022    CHOL 169 01/19/2021    TRIG 49 01/19/2021    HDL 54 01/19/2021    LDLCALC 105.2 01/19/2021    ALT 50 (H) 11/29/2022    AST 25 11/29/2022     11/29/2022    K 4.4 11/29/2022     11/29/2022    CO2 26 11/29/2022    BUN 17 11/29/2022    CREATININE 0.9 11/29/2022    CREATININE 0.8 09/16/2021    CREATININE 0.8 01/19/2021    EGFRNORACEVR >60.0 11/29/2022    TSH 0.941 11/29/2022    TSH 0.980 09/16/2021    TSH 4.653 (H) 01/19/2021    GLU 93 11/29/2022          The ASCVD Risk score (Ivonne DK, et al., 2019) failed to calculate for the following reasons:    The 2019 ASCVD risk score is only valid for ages 40 to 79     Assessment:     1. Routine general medical examination at a health care facility    2. Current moderate episode of major depressive  disorder without prior episode      Plan:   1. Routine general medical examination at a health care facility  -     TSH; Future; Expected date: 11/29/2022  -     Comprehensive Metabolic Panel; Future; Expected date: 11/29/2022  -     CBC Auto Differential; Future; Expected date: 11/29/2022    2. Current moderate episode of major depressive disorder without prior episode  Overview:  Symptoms began in middle school. No h/o psyc hospitalization.     Orders:  -     FLUoxetine 40 MG capsule; Take 1 capsule (40 mg total) by mouth once daily.  Dispense: 90 capsule; Refill: 1    Other orders  -     Influenza - Quadrivalent (PF)        Patient Instructions   Covid vaccine phone number is [phone].     Future Appointments   Date Time Provider Department Center   12/19/2022 11:00 AM THANG Farris [facility] [place] [facility]   5/29/2023  3:40 PM MD LUIS EDUARDO [facility] [place] [facility]           Follow up in about 6 months (around 5/29/2023), or if symptoms worsen or fail to improve, for Virtual Visit ok for f/u.

## 2022-11-30 LAB — TSH SERPL DL<=0.005 MIU/L-ACNC: 0.94 UIU/ML (ref 0.4–4)

## 2022-12-19 ENCOUNTER — OFFICE VISIT (OUTPATIENT)
Dept: GASTROENTEROLOGY | Facility: CLINIC | Age: 22
End: 2022-12-19
Payer: COMMERCIAL

## 2022-12-19 ENCOUNTER — HOSPITAL ENCOUNTER (OUTPATIENT)
Dept: RADIOLOGY | Facility: HOSPITAL | Age: 22
Discharge: HOME | End: 2022-12-19
Attending: NURSE PRACTITIONER
Payer: COMMERCIAL

## 2022-12-19 ENCOUNTER — PATIENT MESSAGE (OUTPATIENT)
Dept: GASTROENTEROLOGY | Facility: CLINIC | Age: 22
End: 2022-12-19

## 2022-12-19 VITALS
DIASTOLIC BLOOD PRESSURE: 64 MMHG | WEIGHT: 242.31 LBS | HEART RATE: 83 BPM | BODY MASS INDEX: 33.92 KG/M2 | HEIGHT: 71 IN | SYSTOLIC BLOOD PRESSURE: 138 MMHG

## 2022-12-19 DIAGNOSIS — R11.0 NAUSEA: ICD-10-CM

## 2022-12-19 DIAGNOSIS — K58.2 IRRITABLE BOWEL SYNDROME WITH BOTH CONSTIPATION AND DIARRHEA: ICD-10-CM

## 2022-12-19 DIAGNOSIS — K58.2 IRRITABLE BOWEL SYNDROME WITH BOTH CONSTIPATION AND DIARRHEA: Primary | ICD-10-CM

## 2022-12-19 PROCEDURE — 99999 PR PBB SHADOW E&M-EST. PATIENT-LVL IV: CPT | Mod: PBBFAC,,, | Performed by: NURSE PRACTITIONER

## 2022-12-19 PROCEDURE — 3075F PR MOST RECENT SYSTOLIC BLOOD PRESS GE 130-139MM HG: ICD-10-PCS | Mod: CPTII,S$GLB,, | Performed by: NURSE PRACTITIONER

## 2022-12-19 PROCEDURE — 3008F BODY MASS INDEX DOCD: CPT | Mod: CPTII,S$GLB,, | Performed by: NURSE PRACTITIONER

## 2022-12-19 PROCEDURE — 74019 RADEX ABDOMEN 2 VIEWS: CPT | Mod: TC

## 2022-12-19 PROCEDURE — 99214 OFFICE O/P EST MOD 30 MIN: CPT | Mod: S$GLB,,, | Performed by: NURSE PRACTITIONER

## 2022-12-19 PROCEDURE — 1159F PR MEDICATION LIST DOCUMENTED IN MEDICAL RECORD: ICD-10-PCS | Mod: CPTII,S$GLB,, | Performed by: NURSE PRACTITIONER

## 2022-12-19 PROCEDURE — 3008F PR BODY MASS INDEX (BMI) DOCUMENTED: ICD-10-PCS | Mod: CPTII,S$GLB,, | Performed by: NURSE PRACTITIONER

## 2022-12-19 PROCEDURE — 74019 RADEX ABDOMEN 2 VIEWS: CPT | Mod: 26,,, | Performed by: RADIOLOGY

## 2022-12-19 PROCEDURE — 3075F SYST BP GE 130 - 139MM HG: CPT | Mod: CPTII,S$GLB,, | Performed by: NURSE PRACTITIONER

## 2022-12-19 PROCEDURE — 1160F PR REVIEW ALL MEDS BY PRESCRIBER/CLIN PHARMACIST DOCUMENTED: ICD-10-PCS | Mod: CPTII,S$GLB,, | Performed by: NURSE PRACTITIONER

## 2022-12-19 PROCEDURE — 99999 PR PBB SHADOW E&M-EST. PATIENT-LVL IV: ICD-10-PCS | Mod: PBBFAC,,, | Performed by: NURSE PRACTITIONER

## 2022-12-19 PROCEDURE — 99214 PR OFFICE/OUTPT VISIT, EST, LEVL IV, 30-39 MIN: ICD-10-PCS | Mod: S$GLB,,, | Performed by: NURSE PRACTITIONER

## 2022-12-19 PROCEDURE — 3078F DIAST BP <80 MM HG: CPT | Mod: CPTII,S$GLB,, | Performed by: NURSE PRACTITIONER

## 2022-12-19 PROCEDURE — 1160F RVW MEDS BY RX/DR IN RCRD: CPT | Mod: CPTII,S$GLB,, | Performed by: NURSE PRACTITIONER

## 2022-12-19 PROCEDURE — 1159F MED LIST DOCD IN RCRD: CPT | Mod: CPTII,S$GLB,, | Performed by: NURSE PRACTITIONER

## 2022-12-19 PROCEDURE — 3078F PR MOST RECENT DIASTOLIC BLOOD PRESSURE < 80 MM HG: ICD-10-PCS | Mod: CPTII,S$GLB,, | Performed by: NURSE PRACTITIONER

## 2022-12-19 PROCEDURE — 74019 XR ABDOMEN FLAT AND ERECT: ICD-10-PCS | Mod: 26,,, | Performed by: RADIOLOGY

## 2022-12-19 NOTE — PROGRESS NOTES
"Clinic Follow Up:  [facility] Follow Up Note    Reason for Follow Up:  The primary encounter diagnosis was Irritable bowel syndrome with both constipation and diarrhea. A diagnosis of Nausea was also pertinent to this visit.    PCP: Bunny Philip       HPI:  This is a 22 y.o. male here for follow up of the above.     He was previously controlled on Bentyl but symptoms returned beginning of summer 2022. Diarrhea is intermittent. He will have 2-3 loose to watery stools on those days. Diarrhea will last 2-3 days before resolving. This occurs at least once a month. No hematochezia or melena. Other associated symptoms include abdominal pain. His abdominal pain is generalized. He does have pain over the RUQ but it is other places as well.   He does have some constipation but is not often.     He will, however, wake up several times per week with an "upset stomach" and nausea. His symptoms do improve in the afternoon (around 2:00) but can last multiple days. He does not eat much on those days because it worsens his symptoms. Symptoms can worsen with greasy foods.     He takes bentyl daily which he believes decreases the frequency of episodes. He states that this helps but doesn't resolve symptoms.     On days he feels constipated, he will take Metamucil. Of he is feeling nauseated, he may take pepto-bismol.     Review of Systems   Constitutional:  Negative for activity change and appetite change.        As per interval history above   Respiratory:  Negative for cough and shortness of breath.    Cardiovascular:  Negative for chest pain.   Gastrointestinal:  Positive for abdominal pain, constipation, diarrhea and nausea. Negative for abdominal distention, anal bleeding, blood in stool, rectal pain and vomiting.   Skin:  Negative for color change and rash.     Medical History:  Past Medical History:   Diagnosis Date    ADHD (attention deficit hyperactivity disorder)     Attention deficit disorder " "11/8/2019    Diagnosed in middle school. Reports h/o seeing psychology or psychiatry.     Chronic constipation     Chronic diarrhea     Depression     GERD (gastroesophageal reflux disease)        Surgical History:   History reviewed. No pertinent surgical history.    Family History:   Family History   Problem Relation Age of Onset    Depression Mother     No Known Problems Father     Cancer Paternal Grandfather         prostate    Depression Brother        Social History:   Social History     Tobacco Use    Smoking status: Never    Smokeless tobacco: Never   Substance Use Topics    Alcohol use: Not Currently     Alcohol/week: 1.0 standard drink     Types: 1 Glasses of wine per week    Drug use: Not Currently     Frequency: 1.0 times per week     Types: Marijuana       Allergies: Review of patient's allergies indicates:  No Known Allergies    Home Medications:  Current Outpatient Medications on File Prior to Visit   Medication Sig Dispense Refill    dicyclomine (BENTYL) 20 mg tablet Take 1 tablet (20 mg total) by mouth 3 (three) times daily as needed (abdominal pain). 90 tablet 11    FLUoxetine 40 MG capsule Take 1 capsule (40 mg total) by mouth once daily. 90 capsule 1    fluticasone propionate (FLONASE) 50 mcg/actuation nasal spray 2 sprays (100 mcg total) by Each Nostril route once daily. 9.9 mL 0    ondansetron (ZOFRAN-ODT) 4 MG TbDL Take 4 mg by mouth 3 (three) times daily as needed.       No current facility-administered medications on file prior to visit.       /64 (BP Location: Left arm, Patient Position: Sitting, BP Method: Large (Manual))   Pulse 83   Ht 5' 11" (1.803 m)   Wt 109.9 kg (242 lb 4.6 oz)   BMI 33.79 kg/m²   Body mass index is 33.79 kg/m².  Physical Exam  Constitutional:       General: He is not in acute distress.  HENT:      Head: Normocephalic and atraumatic.   Eyes:      General: No scleral icterus.     Conjunctiva/sclera: Conjunctivae normal.   Cardiovascular:      Rate and " Rhythm: Normal rate and regular rhythm.      Heart sounds: No murmur heard.  Pulmonary:      Effort: Pulmonary effort is normal. No respiratory distress.      Breath sounds: Normal breath sounds. No wheezing.   Abdominal:      General: Abdomen is flat. Bowel sounds are normal.      Palpations: Abdomen is soft.      Tenderness: There is no abdominal tenderness.   Skin:     General: Skin is warm and dry.   Neurological:      General: No focal deficit present.      Mental Status: He is alert and oriented to person, place, and time.      Cranial Nerves: No cranial nerve deficit.   Psychiatric:         Mood and Affect: Mood normal.         Judgment: Judgment normal.       Labs: Pertinent labs reviewed.  CRC Screening:     Assessment:   1. Irritable bowel syndrome with both constipation and diarrhea    2. Nausea      Symptoms likely IBS, however, does have significant nausea.     Recommendations:   - xray today  - abdominal ultrasound and HIDA if normal.   - continue bentyl  - if no other etiology found besides IBS, consider low dose Elavil QHS     Irritable bowel syndrome with both constipation and diarrhea  -     X-Ray Abdomen Flat And Erect; Future; Expected date: 12/19/2022    Nausea  -     US Abdomen Limited; Future; Expected date: 12/19/2022  -     X-Ray Abdomen Flat And Erect; Future; Expected date: 12/19/2022      Return to Clinic:  Follow up to be determined after results/ procedure(s).    Thank you for the opportunity to participate in the care of this patient.  DIANA Gudino

## 2022-12-22 ENCOUNTER — HOSPITAL ENCOUNTER (OUTPATIENT)
Dept: RADIOLOGY | Facility: HOSPITAL | Age: 22
Discharge: HOME | End: 2022-12-22
Attending: NURSE PRACTITIONER
Payer: COMMERCIAL

## 2022-12-22 DIAGNOSIS — K76.0 FATTY LIVER: Primary | ICD-10-CM

## 2022-12-22 DIAGNOSIS — R11.0 NAUSEA: ICD-10-CM

## 2022-12-22 PROCEDURE — 76705 ECHO EXAM OF ABDOMEN: CPT | Mod: 26,,, | Performed by: RADIOLOGY

## 2022-12-22 PROCEDURE — 76705 US ABDOMEN LIMITED: ICD-10-PCS | Mod: 26,,, | Performed by: RADIOLOGY

## 2022-12-22 PROCEDURE — 76705 ECHO EXAM OF ABDOMEN: CPT | Mod: TC

## 2023-01-18 ENCOUNTER — OFFICE VISIT (OUTPATIENT)
Dept: INTERNAL MEDICINE | Facility: CLINIC | Age: 23
End: 2023-01-18
Payer: COMMERCIAL

## 2023-01-18 VITALS
WEIGHT: 248.25 LBS | DIASTOLIC BLOOD PRESSURE: 60 MMHG | SYSTOLIC BLOOD PRESSURE: 116 MMHG | TEMPERATURE: 98 F | OXYGEN SATURATION: 98 % | HEART RATE: 116 BPM | HEIGHT: 71 IN | BODY MASS INDEX: 34.75 KG/M2

## 2023-01-18 DIAGNOSIS — J00 ACUTE NASOPHARYNGITIS: Primary | ICD-10-CM

## 2023-01-18 DIAGNOSIS — E66.09 CLASS 1 OBESITY DUE TO EXCESS CALORIES WITHOUT SERIOUS COMORBIDITY WITH BODY MASS INDEX (BMI) OF 34.0 TO 34.9 IN ADULT: ICD-10-CM

## 2023-01-18 PROCEDURE — 3078F PR MOST RECENT DIASTOLIC BLOOD PRESSURE < 80 MM HG: ICD-10-PCS | Mod: CPTII,S$GLB,, | Performed by: EMERGENCY MEDICINE

## 2023-01-18 PROCEDURE — 3078F DIAST BP <80 MM HG: CPT | Mod: CPTII,S$GLB,, | Performed by: EMERGENCY MEDICINE

## 2023-01-18 PROCEDURE — 99999 PR PBB SHADOW E&M-EST. PATIENT-LVL III: ICD-10-PCS | Mod: PBBFAC,,, | Performed by: EMERGENCY MEDICINE

## 2023-01-18 PROCEDURE — 3008F BODY MASS INDEX DOCD: CPT | Mod: CPTII,S$GLB,, | Performed by: EMERGENCY MEDICINE

## 2023-01-18 PROCEDURE — 3008F PR BODY MASS INDEX (BMI) DOCUMENTED: ICD-10-PCS | Mod: CPTII,S$GLB,, | Performed by: EMERGENCY MEDICINE

## 2023-01-18 PROCEDURE — 99213 PR OFFICE/OUTPT VISIT, EST, LEVL III, 20-29 MIN: ICD-10-PCS | Mod: S$GLB,,, | Performed by: EMERGENCY MEDICINE

## 2023-01-18 PROCEDURE — 1159F PR MEDICATION LIST DOCUMENTED IN MEDICAL RECORD: ICD-10-PCS | Mod: CPTII,S$GLB,, | Performed by: EMERGENCY MEDICINE

## 2023-01-18 PROCEDURE — 99213 OFFICE O/P EST LOW 20 MIN: CPT | Mod: S$GLB,,, | Performed by: EMERGENCY MEDICINE

## 2023-01-18 PROCEDURE — 99999 PR PBB SHADOW E&M-EST. PATIENT-LVL III: CPT | Mod: PBBFAC,,, | Performed by: EMERGENCY MEDICINE

## 2023-01-18 PROCEDURE — 1159F MED LIST DOCD IN RCRD: CPT | Mod: CPTII,S$GLB,, | Performed by: EMERGENCY MEDICINE

## 2023-01-18 PROCEDURE — 3074F SYST BP LT 130 MM HG: CPT | Mod: CPTII,S$GLB,, | Performed by: EMERGENCY MEDICINE

## 2023-01-18 PROCEDURE — 3074F PR MOST RECENT SYSTOLIC BLOOD PRESSURE < 130 MM HG: ICD-10-PCS | Mod: CPTII,S$GLB,, | Performed by: EMERGENCY MEDICINE

## 2023-01-18 RX ORDER — AZITHROMYCIN 250 MG/1
TABLET, FILM COATED ORAL
Qty: 6 TABLET | Refills: 0 | Status: SHIPPED | OUTPATIENT
Start: 2023-01-18 | End: 2023-01-23

## 2023-01-18 NOTE — PROGRESS NOTES
Subjective:       Patient ID: Simon Goode is a 22 y.o. male.    Chief Complaint: Nasal Congestion (fat), Fatigue, and Cough    Fatigue  Associated symptoms include congestion, coughing and fatigue. Pertinent negatives include no sore throat.   Cough  Associated symptoms include postnasal drip and rhinorrhea. Pertinent negatives include no sore throat or shortness of breath.     22 yr WM, obesity, non smoker, developed nasal congestion this weekend with some cough, runny nose, fatigue, sweating at night, no fever or chills. Has yellow thick nasal discharge with PND, some sinus HA.     Past Medical History:   Diagnosis Date    ADHD (attention deficit hyperactivity disorder)     Attention deficit disorder 11/8/2019    Diagnosed in middle school. Reports h/o seeing psychology or psychiatry.     Chronic constipation     Chronic diarrhea     Depression     GERD (gastroesophageal reflux disease)      History reviewed. No pertinent surgical history.  History reviewed. No pertinent surgical history.  Social History     Socioeconomic History    Marital status: Single   Tobacco Use    Smoking status: Never    Smokeless tobacco: Never   Substance and Sexual Activity    Alcohol use: Not Currently     Alcohol/week: 1.0 standard drink     Types: 1 Glasses of wine per week    Drug use: Not Currently     Frequency: 1.0 times per week     Types: Marijuana    Sexual activity: Not Currently     Partners: Female, Male   Social History Narrative    Brother vapes in the household. 1 cat in the household.     Review of patient's allergies indicates:  No Known Allergies  Current Outpatient Medications   Medication Sig    dicyclomine (BENTYL) 20 mg tablet Take 1 tablet (20 mg total) by mouth 3 (three) times daily as needed (abdominal pain).    FLUoxetine 40 MG capsule Take 1 capsule (40 mg total) by mouth once daily.    fluticasone propionate (FLONASE) 50 mcg/actuation nasal spray 2 sprays (100 mcg total) by Each Nostril  route once daily.    ondansetron (ZOFRAN-ODT) 4 MG TbDL Take 4 mg by mouth 3 (three) times daily as needed.     No current facility-administered medications for this visit.           Review of Systems   Constitutional:  Positive for fatigue.   HENT:  Positive for congestion, postnasal drip and rhinorrhea. Negative for sinus pressure, sinus pain and sore throat.    Eyes: Negative.    Respiratory:  Positive for cough. Negative for shortness of breath.      Objective:      Physical Exam  Vitals and nursing note reviewed.   Constitutional:       Appearance: Normal appearance. He is obese. He is not ill-appearing.   HENT:      Head: Normocephalic and atraumatic.      Right Ear: External ear normal.      Left Ear: External ear normal.      Nose: Congestion and rhinorrhea present.      Mouth/Throat:      Mouth: Mucous membranes are moist.      Pharynx: Oropharynx is clear.   Eyes:      General:         Right eye: No discharge.         Left eye: No discharge.      Extraocular Movements: Extraocular movements intact.      Conjunctiva/sclera: Conjunctivae normal.      Pupils: Pupils are equal, round, and reactive to light.   Cardiovascular:      Rate and Rhythm: Normal rate and regular rhythm.   Pulmonary:      Effort: Pulmonary effort is normal. No respiratory distress.      Breath sounds: Normal breath sounds. No wheezing or rales.   Musculoskeletal:      Cervical back: Normal range of motion and neck supple.   Neurological:      Mental Status: He is alert.       Assessment:     Vitals:    01/18/23 1536   BP: 116/60   Pulse: (!) 116   Temp: 98.1 °F (36.7 °C)         No diagnosis found.    Plan:   There are no diagnoses linked to this encounter.

## 2023-03-17 ENCOUNTER — OFFICE VISIT (OUTPATIENT)
Dept: INTERNAL MEDICINE | Facility: CLINIC | Age: 23
End: 2023-03-17
Payer: COMMERCIAL

## 2023-03-17 VITALS
BODY MASS INDEX: 34.17 KG/M2 | DIASTOLIC BLOOD PRESSURE: 66 MMHG | HEIGHT: 71 IN | OXYGEN SATURATION: 97 % | TEMPERATURE: 99 F | WEIGHT: 244.06 LBS | SYSTOLIC BLOOD PRESSURE: 120 MMHG | HEART RATE: 109 BPM

## 2023-03-17 DIAGNOSIS — F41.9 ANXIETY AND DEPRESSION: Primary | ICD-10-CM

## 2023-03-17 DIAGNOSIS — F90.1 ATTENTION DEFICIT HYPERACTIVITY DISORDER (ADHD), PREDOMINANTLY HYPERACTIVE TYPE: ICD-10-CM

## 2023-03-17 DIAGNOSIS — F32.A ANXIETY AND DEPRESSION: Primary | ICD-10-CM

## 2023-03-17 PROCEDURE — 3078F PR MOST RECENT DIASTOLIC BLOOD PRESSURE < 80 MM HG: ICD-10-PCS | Mod: CPTII,S$GLB,, | Performed by: EMERGENCY MEDICINE

## 2023-03-17 PROCEDURE — 99213 OFFICE O/P EST LOW 20 MIN: CPT | Mod: S$GLB,,, | Performed by: EMERGENCY MEDICINE

## 2023-03-17 PROCEDURE — 3074F PR MOST RECENT SYSTOLIC BLOOD PRESSURE < 130 MM HG: ICD-10-PCS | Mod: CPTII,S$GLB,, | Performed by: EMERGENCY MEDICINE

## 2023-03-17 PROCEDURE — 99213 PR OFFICE/OUTPT VISIT, EST, LEVL III, 20-29 MIN: ICD-10-PCS | Mod: S$GLB,,, | Performed by: EMERGENCY MEDICINE

## 2023-03-17 PROCEDURE — 3078F DIAST BP <80 MM HG: CPT | Mod: CPTII,S$GLB,, | Performed by: EMERGENCY MEDICINE

## 2023-03-17 PROCEDURE — 3008F BODY MASS INDEX DOCD: CPT | Mod: CPTII,S$GLB,, | Performed by: EMERGENCY MEDICINE

## 2023-03-17 PROCEDURE — 99999 PR PBB SHADOW E&M-EST. PATIENT-LVL IV: CPT | Mod: PBBFAC,,, | Performed by: EMERGENCY MEDICINE

## 2023-03-17 PROCEDURE — 3074F SYST BP LT 130 MM HG: CPT | Mod: CPTII,S$GLB,, | Performed by: EMERGENCY MEDICINE

## 2023-03-17 PROCEDURE — 99999 PR PBB SHADOW E&M-EST. PATIENT-LVL IV: ICD-10-PCS | Mod: PBBFAC,,, | Performed by: EMERGENCY MEDICINE

## 2023-03-17 PROCEDURE — 3008F PR BODY MASS INDEX (BMI) DOCUMENTED: ICD-10-PCS | Mod: CPTII,S$GLB,, | Performed by: EMERGENCY MEDICINE

## 2023-03-17 RX ORDER — DEXMETHYLPHENIDATE HYDROCHLORIDE 20 MG/1
20 CAPSULE, EXTENDED RELEASE ORAL DAILY
Qty: 30 CAPSULE | Refills: 0 | Status: SHIPPED | OUTPATIENT
Start: 2023-03-17 | End: 2023-03-30

## 2023-03-17 NOTE — PROGRESS NOTES
Subjective:       Patient ID: Simon Goode is a 22 y.o. male.    Chief Complaint: Anxiety and Establish Care    Fatigue  Pertinent negatives include no congestion, coughing, fatigue or sore throat.   Cough  Pertinent negatives include no postnasal drip, rhinorrhea, sore throat or shortness of breath.   Anxiety  Symptoms include nervous/anxious behavior. Patient reports no shortness of breath or suicidal ideas.         22 yr WM, obesity, non smoker, developed nasal congestion this weekend with some cough, runny nose, fatigue, sweating at night, no fever or chills. Has yellow thick nasal discharge with PND, some sinus HA.     3/17- Simon RTC with his mom, has been battling with severe anxiety and depression over last few years. Also used to have ADHD and was on Adderal during high school but quit after a few years, quite fidgety, has multiple nervous ticks. Mom states that his dad and brother have the same issues.     Past Medical History:   Diagnosis Date    ADHD (attention deficit hyperactivity disorder)     Attention deficit disorder 11/8/2019    Diagnosed in middle school. Reports h/o seeing psychology or psychiatry.     Chronic constipation     Chronic diarrhea     Depression     GERD (gastroesophageal reflux disease)      History reviewed. No pertinent surgical history.  History reviewed. No pertinent surgical history.  Social History     Socioeconomic History    Marital status: Single   Tobacco Use    Smoking status: Never    Smokeless tobacco: Never   Substance and Sexual Activity    Alcohol use: Not Currently     Alcohol/week: 1.0 standard drink     Types: 1 Glasses of wine per week    Drug use: Not Currently     Frequency: 1.0 times per week     Types: Marijuana    Sexual activity: Not Currently     Partners: Female, Male   Social History Narrative    Brother vapes in the household. 1 cat in the household.     Review of patient's allergies indicates:  No Known Allergies  Current Outpatient Medications    Medication Sig    dicyclomine (BENTYL) 20 mg tablet Take 1 tablet (20 mg total) by mouth 3 (three) times daily as needed (abdominal pain).    FLUoxetine 40 MG capsule Take 1 capsule (40 mg total) by mouth once daily.    fluticasone propionate (FLONASE) 50 mcg/actuation nasal spray 2 sprays (100 mcg total) by Each Nostril route once daily.    ondansetron (ZOFRAN-ODT) 4 MG TbDL Take 4 mg by mouth 3 (three) times daily as needed.    dexmethylphenidate (FOCALIN XR) 20 MG 24 hr capsule Take 1 capsule (20 mg total) by mouth once daily.     No current facility-administered medications for this visit.           Review of Systems   Constitutional:  Negative for fatigue.   HENT:  Negative for congestion, postnasal drip, rhinorrhea, sinus pressure, sinus pain and sore throat.    Eyes: Negative.    Respiratory:  Negative for cough and shortness of breath.    Psychiatric/Behavioral:  Negative for behavioral problems, self-injury, sleep disturbance and suicidal ideas. The patient is nervous/anxious and is hyperactive.      Objective:      Physical Exam  Vitals and nursing note reviewed.   Constitutional:       Appearance: Normal appearance. He is obese. He is not ill-appearing.   HENT:      Head: Normocephalic and atraumatic.      Right Ear: External ear normal.      Left Ear: External ear normal.      Nose: No congestion or rhinorrhea.      Mouth/Throat:      Mouth: Mucous membranes are moist.      Pharynx: Oropharynx is clear.   Eyes:      General:         Right eye: No discharge.         Left eye: No discharge.      Extraocular Movements: Extraocular movements intact.      Conjunctiva/sclera: Conjunctivae normal.      Pupils: Pupils are equal, round, and reactive to light.   Cardiovascular:      Rate and Rhythm: Normal rate and regular rhythm.   Pulmonary:      Effort: Pulmonary effort is normal. No respiratory distress.      Breath sounds: Normal breath sounds. No wheezing or rales.   Musculoskeletal:      Cervical back:  Normal range of motion and neck supple.   Neurological:      General: No focal deficit present.      Mental Status: He is alert and oriented to person, place, and time.       Assessment:     Vitals:    03/17/23 1716   BP: 120/66   Pulse: 109   Temp: 98.8 °F (37.1 °C)         1. Anxiety and depression    2. Attention deficit hyperactivity disorder (ADHD), predominantly hyperactive type        Plan:   Anxiety and depression; severe anxiety and depression associated with ADHD and multiple nervous ticks- cont Prozac 40, add Focalin XR 20, refer to psych  -     Ambulatory referral/consult to Psychiatry; Future; Expected date: 03/24/2023  -     Ambulatory referral/consult to Psychology; Future; Expected date: 03/24/2023    Attention deficit hyperactivity disorder (ADHD), predominantly hyperactive type  -     dexmethylphenidate (FOCALIN XR) 20 MG 24 hr capsule; Take 1 capsule (20 mg total) by mouth once daily.  Dispense: 30 capsule; Refill: 0  -     Ambulatory referral/consult to Psychiatry; Future; Expected date: 03/24/2023  -     Ambulatory referral/consult to Psychology; Future; Expected date: 03/24/2023

## 2023-03-28 ENCOUNTER — PATIENT MESSAGE (OUTPATIENT)
Dept: INTERNAL MEDICINE | Facility: CLINIC | Age: 23
End: 2023-03-28
Payer: COMMERCIAL

## 2023-03-30 ENCOUNTER — OFFICE VISIT (OUTPATIENT)
Dept: INTERNAL MEDICINE | Facility: CLINIC | Age: 23
End: 2023-03-30
Payer: COMMERCIAL

## 2023-03-30 VITALS
SYSTOLIC BLOOD PRESSURE: 134 MMHG | DIASTOLIC BLOOD PRESSURE: 80 MMHG | BODY MASS INDEX: 34.41 KG/M2 | WEIGHT: 245.81 LBS | OXYGEN SATURATION: 99 % | HEIGHT: 71 IN | HEART RATE: 98 BPM | TEMPERATURE: 97 F

## 2023-03-30 DIAGNOSIS — F90.1 ATTENTION DEFICIT HYPERACTIVITY DISORDER (ADHD), PREDOMINANTLY HYPERACTIVE TYPE: ICD-10-CM

## 2023-03-30 DIAGNOSIS — J00 ACUTE NASOPHARYNGITIS: Primary | ICD-10-CM

## 2023-03-30 DIAGNOSIS — F41.9 ANXIETY AND DEPRESSION: ICD-10-CM

## 2023-03-30 DIAGNOSIS — F32.A ANXIETY AND DEPRESSION: ICD-10-CM

## 2023-03-30 DIAGNOSIS — E66.09 CLASS 1 OBESITY DUE TO EXCESS CALORIES WITHOUT SERIOUS COMORBIDITY WITH BODY MASS INDEX (BMI) OF 34.0 TO 34.9 IN ADULT: ICD-10-CM

## 2023-03-30 PROCEDURE — 3079F PR MOST RECENT DIASTOLIC BLOOD PRESSURE 80-89 MM HG: ICD-10-PCS | Mod: CPTII,S$GLB,, | Performed by: EMERGENCY MEDICINE

## 2023-03-30 PROCEDURE — 99213 OFFICE O/P EST LOW 20 MIN: CPT | Mod: S$GLB,,, | Performed by: EMERGENCY MEDICINE

## 2023-03-30 PROCEDURE — 1159F MED LIST DOCD IN RCRD: CPT | Mod: CPTII,S$GLB,, | Performed by: EMERGENCY MEDICINE

## 2023-03-30 PROCEDURE — 99999 PR PBB SHADOW E&M-EST. PATIENT-LVL III: ICD-10-PCS | Mod: PBBFAC,,, | Performed by: EMERGENCY MEDICINE

## 2023-03-30 PROCEDURE — 3008F PR BODY MASS INDEX (BMI) DOCUMENTED: ICD-10-PCS | Mod: CPTII,S$GLB,, | Performed by: EMERGENCY MEDICINE

## 2023-03-30 PROCEDURE — 1160F PR REVIEW ALL MEDS BY PRESCRIBER/CLIN PHARMACIST DOCUMENTED: ICD-10-PCS | Mod: CPTII,S$GLB,, | Performed by: EMERGENCY MEDICINE

## 2023-03-30 PROCEDURE — 3075F PR MOST RECENT SYSTOLIC BLOOD PRESS GE 130-139MM HG: ICD-10-PCS | Mod: CPTII,S$GLB,, | Performed by: EMERGENCY MEDICINE

## 2023-03-30 PROCEDURE — 3008F BODY MASS INDEX DOCD: CPT | Mod: CPTII,S$GLB,, | Performed by: EMERGENCY MEDICINE

## 2023-03-30 PROCEDURE — 1159F PR MEDICATION LIST DOCUMENTED IN MEDICAL RECORD: ICD-10-PCS | Mod: CPTII,S$GLB,, | Performed by: EMERGENCY MEDICINE

## 2023-03-30 PROCEDURE — 3079F DIAST BP 80-89 MM HG: CPT | Mod: CPTII,S$GLB,, | Performed by: EMERGENCY MEDICINE

## 2023-03-30 PROCEDURE — 3075F SYST BP GE 130 - 139MM HG: CPT | Mod: CPTII,S$GLB,, | Performed by: EMERGENCY MEDICINE

## 2023-03-30 PROCEDURE — 99999 PR PBB SHADOW E&M-EST. PATIENT-LVL III: CPT | Mod: PBBFAC,,, | Performed by: EMERGENCY MEDICINE

## 2023-03-30 PROCEDURE — 99213 PR OFFICE/OUTPT VISIT, EST, LEVL III, 20-29 MIN: ICD-10-PCS | Mod: S$GLB,,, | Performed by: EMERGENCY MEDICINE

## 2023-03-30 PROCEDURE — 1160F RVW MEDS BY RX/DR IN RCRD: CPT | Mod: CPTII,S$GLB,, | Performed by: EMERGENCY MEDICINE

## 2023-03-30 RX ORDER — DEXMETHYLPHENIDATE HYDROCHLORIDE 20 MG/1
20 CAPSULE, EXTENDED RELEASE ORAL DAILY
Qty: 30 CAPSULE | Refills: 0 | Status: SHIPPED | OUTPATIENT
Start: 2023-03-30 | End: 2023-04-26 | Stop reason: SDUPTHER

## 2023-03-30 RX ORDER — AZITHROMYCIN 250 MG/1
TABLET, FILM COATED ORAL
Qty: 6 TABLET | Refills: 1 | Status: SHIPPED | OUTPATIENT
Start: 2023-03-30 | End: 2023-04-04

## 2023-03-30 NOTE — PROGRESS NOTES
Subjective:       Patient ID: Simon Goode is a 22 y.o. male.    Chief Complaint: No chief complaint on file.    Fatigue  Associated symptoms include coughing and a sore throat. Pertinent negatives include no chills, congestion, fatigue or fever.   Cough  Associated symptoms include postnasal drip, rhinorrhea and a sore throat. Pertinent negatives include no chills or fever.   Anxiety  Symptoms include nervous/anxious behavior. Patient reports no suicidal ideas.         22 yr WM, obesity, non smoker, developed nasal congestion this weekend with some cough, runny nose, fatigue, sweating at night, no fever or chills. Has yellow thick nasal discharge with PND, some sinus HA.     3/17- Simon RTC with his mom, has been battling with severe anxiety and depression over last few years. Also used to have ADHD and was on Adderal during high school but quit after a few years, quite fidgety, has multiple nervous ticks. Mom states that his dad and brother have the same issues.     3/29- Simon RTC for f/u, c/o chest cold x 1 week, started as a sore throat and then developed runny nose and cough with green sputum and HA, no FC. Took Dayquil/Nyquil without much relief.       Past Medical History:   Diagnosis Date    ADHD (attention deficit hyperactivity disorder)     Attention deficit disorder 11/8/2019    Diagnosed in middle school. Reports h/o seeing psychology or psychiatry.     Chronic constipation     Chronic diarrhea     Depression     GERD (gastroesophageal reflux disease)      History reviewed. No pertinent surgical history.  History reviewed. No pertinent surgical history.  Social History     Socioeconomic History    Marital status: Single   Tobacco Use    Smoking status: Never    Smokeless tobacco: Never   Substance and Sexual Activity    Alcohol use: Not Currently     Alcohol/week: 1.0 standard drink     Types: 1 Glasses of wine per week    Drug use: Not Currently     Frequency: 1.0 times per week     Types: Marijuana     Sexual activity: Not Currently     Partners: Female, Male   Social History Narrative    Brother vapes in the household. 1 cat in the household.     Review of patient's allergies indicates:  No Known Allergies  Current Outpatient Medications   Medication Sig    dicyclomine (BENTYL) 20 mg tablet Take 1 tablet (20 mg total) by mouth 3 (three) times daily as needed (abdominal pain).    FLUoxetine 40 MG capsule Take 1 capsule (40 mg total) by mouth once daily.    fluticasone propionate (FLONASE) 50 mcg/actuation nasal spray 2 sprays (100 mcg total) by Each Nostril route once daily.    ondansetron (ZOFRAN-ODT) 4 MG TbDL Take 4 mg by mouth 3 (three) times daily as needed.    dexmethylphenidate (FOCALIN XR) 20 MG 24 hr capsule Take 1 capsule (20 mg total) by mouth once daily. (Patient not taking: Reported on 3/30/2023)     No current facility-administered medications for this visit.           Review of Systems   Constitutional:  Negative for chills, fatigue and fever.   HENT:  Positive for postnasal drip, rhinorrhea, sinus pain and sore throat. Negative for congestion, sinus pressure, sneezing, tinnitus, trouble swallowing and voice change.    Eyes: Negative.    Respiratory:  Positive for cough.    Cardiovascular: Negative.    Psychiatric/Behavioral:  Negative for behavioral problems, self-injury, sleep disturbance and suicidal ideas. The patient is nervous/anxious and is hyperactive.      Objective:      Physical Exam  Vitals and nursing note reviewed.   Constitutional:       Appearance: Normal appearance. He is obese. He is not ill-appearing.   HENT:      Head: Normocephalic and atraumatic.      Right Ear: External ear normal.      Left Ear: External ear normal.      Nose: Congestion and rhinorrhea present.      Mouth/Throat:      Mouth: Mucous membranes are moist.      Pharynx: Oropharynx is clear. No oropharyngeal exudate or posterior oropharyngeal erythema.   Eyes:      General:         Right eye: No discharge.          Left eye: No discharge.      Extraocular Movements: Extraocular movements intact.      Conjunctiva/sclera: Conjunctivae normal.      Pupils: Pupils are equal, round, and reactive to light.   Cardiovascular:      Rate and Rhythm: Normal rate and regular rhythm.   Pulmonary:      Effort: Pulmonary effort is normal. No respiratory distress.      Breath sounds: Normal breath sounds. No wheezing or rales.   Musculoskeletal:      Cervical back: Normal range of motion and neck supple.   Neurological:      General: No focal deficit present.      Mental Status: He is alert and oriented to person, place, and time.       Assessment:     Vitals:    03/30/23 1326   BP: 134/80   Pulse: 98   Temp: 97.3 °F (36.3 °C)         1. Acute nasopharyngitis    2. Class 1 obesity due to excess calories without serious comorbidity with body mass index (BMI) of 34.0 to 34.9 in adult    3. Anxiety and depression        Plan:   Anxiety and depression; severe anxiety and depression associated with ADHD and multiple nervous ticks- cont Prozac 40, add Focalin XR 20, refer to psych  -     Ambulatory referral/consult to Psychiatry; Future; Expected date: 03/24/2023  -     Ambulatory referral/consult to Psychology; Future; Expected date: 03/24/2023    3/29- Could not get PA for the Focalin yet, will check with Pharmacy    Attention deficit hyperactivity disorder (ADHD), predominantly hyperactive type  -     dexmethylphenidate (FOCALIN XR) 20 MG 24 hr capsule; Take 1 capsule (20 mg total) by mouth once daily.  Dispense: 30 capsule; Refill: 0  -     Ambulatory referral/consult to Psychiatry; Future; Expected date: 03/24/2023  -     Ambulatory referral/consult to Psychology; Future; Expected date: 03/24/2023      3/29- Acute Nasopharyngitis- initially Viral and then turned bacterial- treat with Zithromax and Steam Inhalation plus Emergen C    3/29- Class 1 Obesity- must diet and exercise- needs 50 lbs weight loss, has started jogging now

## 2023-04-11 ENCOUNTER — TELEPHONE (OUTPATIENT)
Dept: PSYCHIATRY | Facility: CLINIC | Age: 23
End: 2023-04-11
Payer: COMMERCIAL

## 2023-04-11 NOTE — TELEPHONE ENCOUNTER
----- Message from Nati Bonilla sent at 4/11/2023 11:28 AM CDT -----  Regarding: Reschedule Appt  Contact: Patient  Patient is requesting a call back to reschedule appt missed on 04/04/2023   Patient stated he called and left a msg but has not heard back from office to reschedule   Please Assist     Patient can be reached at [phone]

## 2023-04-12 NOTE — PROGRESS NOTES
"Outpatient Psychiatry Initial Visit with MD    4/26/2023    IDENTIFYING DATA:  Name: Simon Goode  Occupation: online [employer] majoring in English one class.  and working  at [employer]. 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 Technically employed .  Paid lot less.   Lives at home with his parents and his older brother 25 years old.     Site:  [facility]    Simon Goode is a 22 y.o.  single male who was referred by Shruti Sanders MD, presents for initial evaluation visit. Met with patient.       Chief Complaint: " been struggle with anxiety and depression"     History of Present Illness:     3 wishes : 1.  To get rid of poverty , everyone unlimited resources 2. Climate change 3. To be happy  Ideal job: to be a .    Hobbies: to read , to play video games, card games like Monetate games, magic Kapturing card games, role playing games. Does not have a gorup of people to play with.    Depression came first. That started early middle school . No triggers.  Remember all of a sudden, things started feeling different and did not reason why  Had less energy. Things started to feel a burden.  It became worse in high school. Did see a therapist   Started also taking prozac and that helped a lot  with depression.    Felt like he has managing for a few years, but does not know when it went off.  Went college left home and went to [employer] and   Stopped taking the prozac, things went downhill and drop out of college and came home.  Then started taking medication, have a normal routing getting a job.  Longest period where he feels depressed around a month.   Symptoms include feeling down, hopeless, anhedonia     When it gets bad, he lays in bed and take baths and showers all day long.   Usually does not eat when he depressed.  No history of self harm. No suicide attempts.  Currently , no si/hi. No " "psychosis.   Rates depression as mild.  Depression is better with getting up and doing things, going to work.   Depression is worse when he fails to do something.  I.e. work task.    Currently on Prozac 40mg daily since March 16 2023.  It has helped with the depression.      Always struggle with anxiety in the background. It is never cause problems in his life before.  Around when covid, the anxiety disrupted his schedule.  Job has been hectic and crazy.   Instability has made anxiety worse.   He wrote something down a month ago.   See below:   "I feel nausea writing this.  Deal with digestive issues, physical comforts.   Some of these periods, most last a few house in   Struggle with anxiety /depression. Had a good handle with medication and normal routine. Love his job which helps  Covid messed with his schedule  Last few year, of physical comforts then saw a gastro, but could not find anything wrong  Thought something was phsycially wrong, some success with treating it but not everything  Sweating fatigue, were still there, and stomach problems came back with medications.  Thought it could be anxiety,  but dismiss it.  College classes started again, and wake up more nausea and miss work. Makes him feel guilty.  Sometimes he feels so much going on,   Used to have pill organizer has not used it but recedntly started back again.  Most of the time, I feel dread   Sometime upcoming event. An upending doom and cry and sleep.  Trouble sleeping as well. Mind will not stop for hours. "    He worries about his school assignment for college, about upcoming assighemnt, his schedule at work (it changes day to day)   He loves the work. The fact that is unknown makes it anxious  He worries about the world. About the state of the world, the future,   Never goes a day without worrying.   Worries keep him up at night, on edge, irritable, muscle tension.  Anxiety is constant.  Rates anxiety as moderate.  Anxiety is better talking " "to him mom.  Anxiety is worse with going to public places and interacting with people, talking on phone.     He is fine with public places if he does not interact with people. It is with meeting new people.   Used to get panic attacks when in high school. Mostly revolving around school work.   A couple of weeks ago, he missed a deadline and he got worked up and had a     Anxiety is worse than depression right now while on the prozac.    At [facility], he did not need and stop the prozac and adhd,   Then lost the motivation to take it.     Diagnosed with adhd in 6th grade.medicated in 6th grade and through college  When he stop taking the prozac and adhd    He was prescribe the foalin due to insurance and remember he took his medication.   Do a PA.       Trouble paying close attention to details, or careless mistakes: admits to  difficulty sustaining attention/remaining focused: admits to  Absent minded/wandeing thoughts during conversation: admits to  Doesn't follow through on instructions, starts tasks but does not finish or easily distracted: admits to  Difficulty with organizing: admits to  Avoids/dislikes tasks that require sustained attention: admits to  Looses important things: admits to  Easily distracted by extraneous stimuli: admits to  Forgetful in daily activities: admits to  ------  Often fidgets/squirms: admits to  Often leaves seat at inappropriate times: denies  Runs around, climbing on things or feeling restless: denies  Unable to engage in leisure activities: denies  Often "on the go" , motor driven: denies  Often talks excessively: admits to  Blurts out, interrupts: admits to  Can't wait turn: denies  Often interrupts/intrudes: denies    Tics - Facial eye and nose will twitch.   It went away when it went to high school.   No longer has it.       Mood - happy and worried    confirms marijuana use in the past. Using for every other day for 1 month.  Daily user for since he was 16 years old. Quit 3 " weeks ago. It might be contributing to depression/anxiety. Could be contributing to stomach issue. Mom has been concerned about it.  Notes he was using marijuana to help with anxiety.         Symptom Clusters:  ADHD See hpi   Depressive Disorder: See hpi   Anxiety Disorder: See hpi   Manic Disorder: denies   Psychotic Disorder: denies   Tic Disorder History of tics. None now   Physical or Sexual Abuse denies         PHQ-9 Depression Patient Health Questionnaire 4/26/2023   Patient agreed to terms: Yes   Little interest or pleasure in doing things 0   Feeling down, depressed, or hopeless 1   Trouble falling or staying asleep, or sleeping too much 3   Feeling tired or having little energy 2   Poor appetite or overeating 2   Feeling bad about yourself - or that you are a failure or have let yourself or your family down 0   Trouble concentrating on things, such as reading the newspaper or watching television 2   Moving or speaking so slowly that other people could have noticed. Or the opposite - being so fidgety or restless that you have been moving around a lot more than usual 0   Thoughts that you would be better off dead, or of hurting yourself in some way 0   PHQ-9 Total Score 10   If you checked off any problems, how difficult have these problems made it for you to do your work, take care of things at home, or get along with other people? Very difficult   Interpretation Moderate     WENDIE-7 4/26/2023   1. Feeling nervous, anxious, or on edge? More than half the days   2. Not being able to stop or control worrying? Several days   3. Worrying too much about different things? Several days   4. Trouble relaxing? Nearly everyday   5. Being so restless that it is hard to sit still? More than half the days   6. Becoming easily annoyed or irritable? Several days   7. Feeling afraid as if something awful might happen? More than half the days   WENDIE-7 Score 12   Number answered (out of first 7) 7   Interpretation Moderate  Anxiety         Substance Use:   denies any eating disorders.  Occasionally  alcohol use once every 2-3 months.  confirms marijuana use in the past. Using for every other day for 1 month.  Daily user for since he was 16 years old. Quit 3 weeks ago. It might be contributing to depression/anxiety. Could be contributing to stomach issue. Mom has been concerned about it.  Notes he was using marijuana to help with anxiety.   denies illicit drugs.  denies tobacco use.    Past Psychiatric History:   Previous Psychiatric Hospitalizations: No  Previous SI/HI: No  Previous Suicide Attempts: No  Psychiatric Care (current & past): Yes - possibly in 6th grade  History of Psychotherapy: Yes - last time in high school       Past Psychiatric Medication Trials: prozac, focalin    Social History:     Raised by both parents.   Very happy childhood  1 older brother    Marital Status: single  Children: none  Education: currently at [employer]  Support Person: brother and mom    Current Living Circumstances: lives with his parents and older brother    Family Psychiatric History: mom -dad brother - grandmother - . Mom - used to take wellbutrin;  brother - wellbutrin and zoloft ;      Trauma History: denies    Legal History: denies        Current Medications:   Current Outpatient Medications   Medication Instructions    dexmethylphenidate (FOCALIN XR) 20 mg, Oral, Daily    dicyclomine (BENTYL) 20 mg, Oral, 3 times daily PRN    FLUoxetine 40 mg, Oral, Daily    fluticasone propionate (FLONASE) 100 mcg, Each Nostril, Daily    ondansetron (ZOFRAN-ODT) 4 mg, Oral, 3 times daily PRN       Allergies:   Review of patient's allergies indicates:  No Known Allergies    Review Of Systems:     General : NO chills or fever  Eyes: NO  visual changes  ENT: NO hearing change, nasal discharge or sore throat  Endocrine: NO weight changes or polydipsia/polyuria  Dermatological: NO rashes  Respiratory: NO cough, shortness of breath  Cardiovascular: NO  chest pain, palpitations or racing heart  Gastrointestinal: NO nausea, vomiting, constipation or diarrhea  Musculoskeletal: NO muscle pain or stiffness  Neurological: NO confusion, dizziness, headaches or tremors  Psychiatric: please see HPI    Past Medical History:     Past Medical History:   Diagnosis Date    ADHD (attention deficit hyperactivity disorder)     Attention deficit disorder 11/8/2019    Diagnosed in middle school. Reports h/o seeing psychology or psychiatry.     Chronic constipation     Chronic diarrhea     Depression     GERD (gastroesophageal reflux disease)          Past Surgical History:      has no past surgical history on file.    Birth and Developmental History:     Evaluated and found noncontributory.    Current Evaluation:       Constitutional  Vitals:  Vitals:    04/26/23 0900   BP: 124/80   BP Location: Left arm   Patient Position: Sitting   Pulse: 71   Weight: 108.5 kg (239 lb 3.2 oz)       General:  unremarkable, age appropriate, overweight     Musculoskeletal  Muscle Strength/Tone:  no tremor, no tic   Gait & Station:  non-ataxic     Mental Status Exam:  Comment: shoulder length curly hair. Fair eye contact. Legs jittery. Weaing a mask.   Appearance: of stated age Casually dressed  Behavior:  cooperative   Psychomotor: Restless & fidgety   Speech:  normal rate, rhythm and volume  Mood:  happy and worried  Affect:  appropriate  Thought Process:  within normal limits  Associations: intact  Thought Content:  no si/hi.   Thought Perceptions: no a/v hallucinations  Memory:  Intact  Attention Span and Concentration:  Normal  Fund of Knowledge:  Intact  Estimate of Intelligence:  Average   Language: no abnormalities  Insight/Judgement:  Fair  Cognition:  grossly intact  Orientation:  person, place, time, and situation    LABORATORY DATA  No visits with results within 1 Month(s) from this visit.   Latest known visit with results is:   Lab Visit on 11/29/2022   Component Date Value Ref Range  Status    TSH 11/29/2022 0.941  0.400 - 4.000 uIU/mL Final    Sodium 11/29/2022 140  136 - 145 mmol/L Final    Potassium 11/29/2022 4.4  3.5 - 5.1 mmol/L Final    Chloride 11/29/2022 106  95 - 110 mmol/L Final    CO2 11/29/2022 26  23 - 29 mmol/L Final    Glucose 11/29/2022 93  70 - 110 mg/dL Final    BUN 11/29/2022 17  6 - 20 mg/dL Final    Creatinine 11/29/2022 0.9  0.5 - 1.4 mg/dL Final    Calcium 11/29/2022 9.8  8.7 - 10.5 mg/dL Final    Total Protein 11/29/2022 6.8  6.0 - 8.4 g/dL Final    Albumin 11/29/2022 4.1  3.5 - 5.2 g/dL Final    Total Bilirubin 11/29/2022 0.5  0.1 - 1.0 mg/dL Final    Alkaline Phosphatase 11/29/2022 51 (L)  55 - 135 U/L Final    AST 11/29/2022 25  10 - 40 U/L Final    ALT 11/29/2022 50 (H)  10 - 44 U/L Final    Anion Gap 11/29/2022 8  8 - 16 mmol/L Final    eGFR 11/29/2022 >60.0  >60 mL/min/1.73 m^2 Final    WBC 11/29/2022 4.59  3.90 - 12.70 K/uL Final    RBC 11/29/2022 4.81  4.60 - 6.20 M/uL Final    Hemoglobin 11/29/2022 14.6  14.0 - 18.0 g/dL Final    Hematocrit 11/29/2022 42.8  40.0 - 54.0 % Final    MCV 11/29/2022 89  82 - 98 fL Final    MCH 11/29/2022 30.4  27.0 - 31.0 pg Final    MCHC 11/29/2022 34.1  32.0 - 36.0 g/dL Final    RDW 11/29/2022 12.1  11.5 - 14.5 % Final    Platelets 11/29/2022 217  150 - 450 K/uL Final    MPV 11/29/2022 12.0  9.2 - 12.9 fL Final    Immature Granulocytes 11/29/2022 0.2  0.0 - 0.5 % Final    Gran # (ANC) 11/29/2022 2.0  1.8 - 7.7 K/uL Final    Immature Grans (Abs) 11/29/2022 0.01  0.00 - 0.04 K/uL Final    Lymph # 11/29/2022 1.9  1.0 - 4.8 K/uL Final    Mono # 11/29/2022 0.6  0.3 - 1.0 K/uL Final    Eos # 11/29/2022 0.1  0.0 - 0.5 K/uL Final    Baso # 11/29/2022 0.01  0.00 - 0.20 K/uL Final    nRBC 11/29/2022 0  0 /100 WBC Final    Gran % 11/29/2022 43.8  38.0 - 73.0 % Final    Lymph % 11/29/2022 42.3  18.0 - 48.0 % Final    Mono % 11/29/2022 12.0  4.0 - 15.0 % Final    Eosinophil % 11/29/2022 1.5  0.0 - 8.0 % Final    Basophil % 11/29/2022 0.2  0.0 -  1.9 % Final    Differential Method 11/29/2022 Automated   Final              Assessment - Diagnosis - Goals:     Impression: The patient's history and clinical presentation are consistent with a diagnosis of MDD, WENDIE, ADHD, inattentive type.    1. WENDIE (generalized anxiety disorder)        2. Current moderate episode of major depressive disorder without prior episode  FLUoxetine 40 MG capsule      3. Attention deficit hyperactivity disorder (ADHD), predominantly inattentive type        4. Attention deficit hyperactivity disorder (ADHD), predominantly hyperactive type  dexmethylphenidate (FOCALIN XR) 20 MG 24 hr capsule           Interventions/Recommendations/Plan:    PROBLEM:anxiety  COMMENT:baseline  PLAN:will increase Prozac 40mg morning to 60mg morning.   Will start vistaril 25mg TID prn anxiety.  Will be seeing Tori Garcia in May .     PROBLEM:depression  COMMENT:manageable  PLAN:will increase Prozac 40mg morning to 60mg morning.     PROBLEM:adhd  COMMENT:baseline  PLAN:will start Focalin XR 20mg   May drug test at any time    PROBLEM:sleep  COMMENT:baseline  PLAN:will start clonidine 0.1mg qhs     PROBLEM:physical  (upset stomach, sweating, nausea)  COMMENT:baseline  PLAN:see plan above        Psychotherapy: None at this time    Return to Clinic: 6 weeks    Length of Visit: 60 minutes    SAFETY: plan discussed with patient. Abstain from drug/etoh/tobacco use. Patient to have no access to guns/ weapons. If any suicidal or homicidal ideation or plan, or new or worsening symptoms, call 911/go to ED. Risks, benefits , and alternatives to treatment discussed with patient and guardian who demonstrated understanding and agreement and chose to treat. Patient will call if any questions or concerns. Continue regular follow up with PCP for all non-psychiatric medical conditions.        Lanhuong Nguyen DO [facility]

## 2023-04-26 ENCOUNTER — OFFICE VISIT (OUTPATIENT)
Dept: PSYCHIATRY | Facility: CLINIC | Age: 23
End: 2023-04-26
Payer: COMMERCIAL

## 2023-04-26 VITALS
DIASTOLIC BLOOD PRESSURE: 80 MMHG | HEART RATE: 71 BPM | SYSTOLIC BLOOD PRESSURE: 124 MMHG | WEIGHT: 239.19 LBS | BODY MASS INDEX: 33.36 KG/M2

## 2023-04-26 DIAGNOSIS — F32.1 CURRENT MODERATE EPISODE OF MAJOR DEPRESSIVE DISORDER WITHOUT PRIOR EPISODE: ICD-10-CM

## 2023-04-26 DIAGNOSIS — F41.1 GAD (GENERALIZED ANXIETY DISORDER): Primary | ICD-10-CM

## 2023-04-26 DIAGNOSIS — F90.1 ATTENTION DEFICIT HYPERACTIVITY DISORDER (ADHD), PREDOMINANTLY HYPERACTIVE TYPE: ICD-10-CM

## 2023-04-26 DIAGNOSIS — F90.0 ATTENTION DEFICIT HYPERACTIVITY DISORDER (ADHD), PREDOMINANTLY INATTENTIVE TYPE: ICD-10-CM

## 2023-04-26 PROBLEM — F32.A DEPRESSION: Status: RESOLVED | Noted: 2017-04-20 | Resolved: 2023-04-26

## 2023-04-26 PROCEDURE — 1160F PR REVIEW ALL MEDS BY PRESCRIBER/CLIN PHARMACIST DOCUMENTED: ICD-10-PCS | Mod: CPTII,S$GLB,, | Performed by: PSYCHIATRY & NEUROLOGY

## 2023-04-26 PROCEDURE — 3079F DIAST BP 80-89 MM HG: CPT | Mod: CPTII,S$GLB,, | Performed by: PSYCHIATRY & NEUROLOGY

## 2023-04-26 PROCEDURE — 99999 PR PBB SHADOW E&M-EST. PATIENT-LVL II: ICD-10-PCS | Mod: PBBFAC,,, | Performed by: PSYCHIATRY & NEUROLOGY

## 2023-04-26 PROCEDURE — 3074F SYST BP LT 130 MM HG: CPT | Mod: CPTII,S$GLB,, | Performed by: PSYCHIATRY & NEUROLOGY

## 2023-04-26 PROCEDURE — 90792 PSYCH DIAG EVAL W/MED SRVCS: CPT | Mod: S$GLB,,, | Performed by: PSYCHIATRY & NEUROLOGY

## 2023-04-26 PROCEDURE — 1159F MED LIST DOCD IN RCRD: CPT | Mod: CPTII,S$GLB,, | Performed by: PSYCHIATRY & NEUROLOGY

## 2023-04-26 PROCEDURE — 3008F BODY MASS INDEX DOCD: CPT | Mod: CPTII,S$GLB,, | Performed by: PSYCHIATRY & NEUROLOGY

## 2023-04-26 PROCEDURE — 3079F PR MOST RECENT DIASTOLIC BLOOD PRESSURE 80-89 MM HG: ICD-10-PCS | Mod: CPTII,S$GLB,, | Performed by: PSYCHIATRY & NEUROLOGY

## 2023-04-26 PROCEDURE — 3008F PR BODY MASS INDEX (BMI) DOCUMENTED: ICD-10-PCS | Mod: CPTII,S$GLB,, | Performed by: PSYCHIATRY & NEUROLOGY

## 2023-04-26 PROCEDURE — 90792 PR PSYCHIATRIC DIAGNOSTIC EVALUATION W/MEDICAL SERVICES: ICD-10-PCS | Mod: S$GLB,,, | Performed by: PSYCHIATRY & NEUROLOGY

## 2023-04-26 PROCEDURE — 3074F PR MOST RECENT SYSTOLIC BLOOD PRESSURE < 130 MM HG: ICD-10-PCS | Mod: CPTII,S$GLB,, | Performed by: PSYCHIATRY & NEUROLOGY

## 2023-04-26 PROCEDURE — 1160F RVW MEDS BY RX/DR IN RCRD: CPT | Mod: CPTII,S$GLB,, | Performed by: PSYCHIATRY & NEUROLOGY

## 2023-04-26 PROCEDURE — 99999 PR PBB SHADOW E&M-EST. PATIENT-LVL II: CPT | Mod: PBBFAC,,, | Performed by: PSYCHIATRY & NEUROLOGY

## 2023-04-26 PROCEDURE — 1159F PR MEDICATION LIST DOCUMENTED IN MEDICAL RECORD: ICD-10-PCS | Mod: CPTII,S$GLB,, | Performed by: PSYCHIATRY & NEUROLOGY

## 2023-04-26 RX ORDER — HYDROXYZINE PAMOATE 25 MG/1
25 CAPSULE ORAL 3 TIMES DAILY PRN
Qty: 90 CAPSULE | Refills: 2 | Status: SHIPPED | OUTPATIENT
Start: 2023-04-26 | End: 2023-07-24

## 2023-04-26 RX ORDER — FLUOXETINE HYDROCHLORIDE 40 MG/1
40 CAPSULE ORAL DAILY
Qty: 90 CAPSULE | Refills: 1 | Status: SHIPPED | OUTPATIENT
Start: 2023-04-26 | End: 2023-06-08 | Stop reason: SDUPTHER

## 2023-04-26 RX ORDER — CLONIDINE HYDROCHLORIDE 0.1 MG/1
0.1 TABLET ORAL NIGHTLY
Qty: 30 TABLET | Refills: 5 | Status: SHIPPED | OUTPATIENT
Start: 2023-04-26 | End: 2023-06-08 | Stop reason: SDUPTHER

## 2023-04-26 RX ORDER — DEXMETHYLPHENIDATE HYDROCHLORIDE 20 MG/1
20 CAPSULE, EXTENDED RELEASE ORAL DAILY
Qty: 30 CAPSULE | Refills: 0 | Status: SHIPPED | OUTPATIENT
Start: 2023-04-26 | End: 2023-06-08 | Stop reason: SDUPTHER

## 2023-04-26 RX ORDER — FLUOXETINE HYDROCHLORIDE 20 MG/1
20 CAPSULE ORAL DAILY
Qty: 30 CAPSULE | Refills: 5 | Status: SHIPPED | OUTPATIENT
Start: 2023-04-26 | End: 2023-05-18

## 2023-05-03 NOTE — PROGRESS NOTES
Psychiatry Initial Visit (PhD/LCSW)    Diagnostic Interview - CPT 28139    Visit Type: Telehealth    Due to the nature of this visit type, a virtual visit with synchronous audio and video, each patient to whom this provider administers behavioral health services by telemedicine is: (1) informed of the relationship between the provider and patient and the respective role of any other health care provider with respect to management of the patient; and (2) notified that he or she may decline to receive services by telemedicine and may withdraw from such care at any time.    The patient was informed of the following:     Provider's contact info:  [facility]  [id number] [place] [address] Drive, 3rd Floor, [address]  [place] [address]  (Phone) [phone]    If technology issues occur, call office phone: Ph: [phone]  If crisis: Dial 911 or go to nearest Emergency Room (ER)  If questions related to privacy practices: contact [facility] Information Department: [phone]    For security purposes, the pt identified that they were sitting alone in the parking lot of their workplace during today's session and contact number is [phone].    The pt's emergency contact(s) is Extended Emergency Contact Information  Primary Emergency Contact: MARIO KOHLI  Address: [address] [place] [address] [place] [address] [facility]  Home Phone: [phone]  Relation: Mother   needed? No.    Crisis Disclaimer: Patient was informed that due to the virtual nature of the visit, that if a crisis develops, protocols will be implemented to ensure patient safety, including but not limited to: 1) Initiating a welfare check with local law enforcement and/or 2) Calling 911    Date: 5/9/2023    Site: [facility]  Referral source: PCP  Outpatient Psychiatric Provider: Dr. Ledy Zhou  Primary care provider: Shruti Sanders MD  Clinical status of patient: Outpatient  MRN:  [id number]    Simon Goode, a 22 y.o. male, for initial evaluation visit. Met with patient.    Preferred Name: Simon   Transgender Identity Form    Gender Identity Form  Gender Identity: cisgender male  Sex at Birth: male  Patient Pronouns: he/him/his  Transition Summary         Information included in this assessment was obtained through face to face interview (via telehealth or in person) with the patient, records within the [facility] EMR system available to this provider at the time/date of this assessment, and through collateral sources present during the assessment interview. Excerpts of encounter notes may be denoted in the following assessment as supplement to the assessment - unless otherwise cited by this provider, see Epic EMR system for full encounter notes/evaluations.     Evaluation and treatment is based on information presented to date. Any information presented after the completion date of this assessment may affect assessment relevancy/applicability and findings.     Subjective:     Chief complaint/reason for encounter: Establish with provider for psychiatric medication management and/or therapy.    Current symptoms:   Depression: Subjective Sadness/Depressed Mood, Disinterest in Previously Pleasurable Activities (Anhedonia), Easily Irritable, Fluctuating Appetite, Decreased Sleep (ie, Difficulty Falling Asleep, Frequent Awakenings), Decrease in Energy/Lethargy, and Poor Concentration  Anxiety: Excessive Worry/Concern, Restlessness (External or Internal), Poor Concentration, Easily Irritable, Decreased Sleep (ie, Difficulty Falling Asleep, Frequent Awakenings), Fluctuating Appetite, Gastrointestinal Issues (ie, cramping, nausea, vomiting, diarrhea) , Fatigue/Lethargy, Hypervigilance/Feeling on Edge, and Racing Thoughts/Perseveration  ADHD/ADD Related: Childhood History of ADHD/ADD, Distractibility/Inattentiveness, Restless/Fidgety, Disorganization, Procrastination, Task Incompletion, and Low Stress  "Tolerance  Trauma/PTSD Related: None Noted/Pt Denied  Soni: None Noted/Pt Denied  Psychosis: None Noted/Pt Denied    History of Present Illness:     Pt reported struggling with anxiety and depression across their lifetime "but has been increasing over the last couple years."  Pt reported a historical diagnosis of ADD; however, he noted he has not been medicated for it over the last 4yrs. Pt noted his psychiatric symptomology presented during his early childhood primarily via distractibility, motor/facial tics, hyperactivity, disorganization; similarly, his anxiety and depression symptoms presented during middle school. Pt noted he was initially placed on ADD medication in 6th grade and noted a decrease in his psychiatric symptoms. Pt reported his anxiety and depression insidiously increased during his later high school years through college; pt noted his symptoms peaked during the 2020 Covid-19 Pandemic. Pt reported his anxiety and depression has more recently been presenting via gastrointestinal symptoms. Pt cited his occupational/educational life are sources of stress - pt reported, in conjunction to working full time as an , he is also a full time student. Pt reported his symptoms caused him to struggle in college which resulted in his withdrawing from his semester courses and moving home. 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 Ledy Zhou).     Pt was referred to outpatient psychiatry by their current PCP - Dr. Shruti Sanders ([facility]; Internal Medicine).    Per Excerpt(s) - Dr. Bahena " "Tommy's referral encounter note (dated: 03/17/2023 - see Epic EMR for full encounter note/evaluation):  "Subjective:      Patient ID: Simon Goode is a 22 y.o. male.     Chief Complaint: Anxiety and Establish Care     Fatigue  Pertinent negatives include no congestion, coughing, fatigue or sore throat.   Cough  Pertinent negatives include no postnasal drip, rhinorrhea, sore throat or shortness of breath.   Anxiety  Symptoms include nervous/anxious behavior. Patient reports no shortness of breath or suicidal ideas.            22 yr WM, obesity, non smoker, developed nasal congestion this weekend with some cough, runny nose, fatigue, sweating at night, no fever or chills. Has yellow thick nasal discharge with PND, some sinus HA.      3/17- Simon RTC with his mom, has been battling with severe anxiety and depression over last few years. Also used to have ADHD and was on Adderal during high school but quit after a few years, quite fidgety, has multiple nervous ticks. Mom states that his dad and brother have the same issues.     ...    1. Anxiety and depression   2. Attention deficit hyperactivity disorder (ADHD), predominantly hyperactive type       Plan:  Anxiety and depression; severe anxiety and depression associated with ADHD and multiple nervous ticks- cont Prozac 40, add Focalin XR 20, refer to psych  -     Ambulatory referral/consult to Psychiatry; Future; Expected date: 03/24/2023  -     Ambulatory referral/consult to Psychology; Future; Expected date: 03/24/2023     Attention deficit hyperactivity disorder (ADHD), predominantly hyperactive type  -     dexmethylphenidate (FOCALIN XR) 20 MG 24 hr capsule; Take 1 capsule (20 mg total) by mouth once daily.  Dispense: 30 capsule; Refill: 0  -     Ambulatory referral/consult to Psychiatry; Future; Expected date: 03/24/2023  -     Ambulatory referral/consult to Psychology; Future; Expected date: 03/24/2023"    Per Excerpt(s) - Dr. Ledy Zhou's initial intake " "encounter note (dated: 04/26/2023 - see Epic EMR for full encounter note/evaluation):  "Outpatient Psychiatry Initial Visit with MD     4/26/2023     IDENTIFYING DATA:  Name: Simon Goode  Occupation: online [employer] majoring in English one class.  and working  at [employer]. 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 Technically employed .  Paid lot less.   Lives at home with his parents and his older brother 25 years old.      Site:  [facility]     Simon Goode is a 22 y.o.  single male who was referred by Shruti Sanders MD, presents for initial evaluation visit. Met with patient.         Chief Complaint: " been struggle with anxiety and depression"      History of Present Illness:      3 wishes : 1.  To get rid of poverty , everyone unlimited resources 2. Climate change 3. To be happy  Ideal job: to be a .     Hobbies: to read , to play video games, card games like [employer] games, magic trading card games, role playing games. Does not have a gorup of people to play with.     Depression came first. That started early middle school . No triggers.  Remember all of a sudden, things started feeling different and did not reason why  Had less energy. Things started to feel a burden.  It became worse in high school. Did see a therapist   Started also taking prozac and that helped a lot  with depression.    Felt like he has managing for a few years, but does not know when it went off.  Went college left home and went to Counts include 234 beds at the [facility] and   Stopped taking the prozac, things went downhill and drop out of college and came home.  Then started taking medication, have a normal routing getting a job.  Longest period where he feels depressed around a month.   Symptoms include feeling down, hopeless, anhedonia     When it gets bad, he lays in bed and take baths and showers all day long.   Usually does not " "eat when he depressed.  No history of self harm. No suicide attempts.  Currently , no si/hi. No psychosis.   Rates depression as mild.  Depression is better with getting up and doing things, going to work.   Depression is worse when he fails to do something.  I.e. work task.    Currently on Prozac 40mg daily since March 16 2023.  It has helped with the depression.        Always struggle with anxiety in the background. It is never cause problems in his life before.  Around when covid, the anxiety disrupted his schedule.  Job has been hectic and crazy.   Instability has made anxiety worse.   He wrote something down a month ago.   See below:   "I feel nausea writing this.  Deal with digestive issues, physical comforts.   Some of these periods, most last a few house in   Struggle with anxiety /depression. Had a good handle with medication and normal routine. Love his job which helps  Covid messed with his schedule  Last few year, of physical comforts then saw a gastro, but could not find anything wrong  Thought something was phsycially wrong, some success with treating it but not everything  Sweating fatigue, were still there, and stomach problems came back with medications.  Thought it could be anxiety,  but dismiss it.  College classes started again, and wake up more nausea and miss work. Makes him feel guilty.  Sometimes he feels so much going on,   Used to have pill organizer has not used it but recedntly started back again.  Most of the time, I feel dread   Sometime upcoming event. An upending doom and cry and sleep.  Trouble sleeping as well. Mind will not stop for hours. "     He worries about his school assignment for college, about upcoming assighemnt, his schedule at work (it changes day to day)   He loves the work. The fact that is unknown makes it anxious  He worries about the world. About the state of the world, the future,   Never goes a day without worrying.   Worries keep him up at night, on edge, " "irritable, muscle tension.  Anxiety is constant.  Rates anxiety as moderate.  Anxiety is better talking to him mom.  Anxiety is worse with going to public places and interacting with people, talking on phone.      He is fine with public places if he does not interact with people. It is with meeting new people.   Used to get panic attacks when in high school. Mostly revolving around school work.   A couple of weeks ago, he missed a deadline and he got worked up and had a      Anxiety is worse than depression right now while on the prozac.     At [facility], he did not need and stop the prozac and adhd,   Then lost the motivation to take it.      Diagnosed with adhd in 6th grade.medicated in 6th grade and through college  When he stop taking the prozac and adhd     He was prescribe the foalin due to insurance and remember he took his medication.   Do a PA.         Trouble paying close attention to details, or careless mistakes: admits to  difficulty sustaining attention/remaining focused: admits to  Absent minded/wandeing thoughts during conversation: admits to  Doesn't follow through on instructions, starts tasks but does not finish or easily distracted: admits to  Difficulty with organizing: admits to  Avoids/dislikes tasks that require sustained attention: admits to  Looses important things: admits to  Easily distracted by extraneous stimuli: admits to  Forgetful in daily activities: admits to  ------  Often fidgets/squirms: admits to  Often leaves seat at inappropriate times: denies  Runs around, climbing on things or feeling restless: denies  Unable to engage in leisure activities: denies  Often "on the go" , motor driven: denies  Often talks excessively: admits to  Blurts out, interrupts: admits to  Can't wait turn: denies  Often interrupts/intrudes: denies     Tics - Facial eye and nose will twitch.   It went away when it went to high school.   No longer has it.         Mood - happy and worried     confirms " "marijuana use in the past. Using for every other day for 1 month.  Daily user for since he was 16 years old. Quit 3 weeks ago. It might be contributing to depression/anxiety. Could be contributing to stomach issue. Mom has been concerned about it.  Notes he was using marijuana to help with anxiety.            Symptom Clusters:  ADHD See hpi  Depressive Disorder: See hpi  Anxiety Disorder: See hpi  Manic Disorder: denies  Psychotic Disorder: denies  Tic Disorder: History of tics. None now  Physical or Sexual Abuse: Denies"        Historical Psychiatric Diagnoses (current and/or historical):  Anxiety, Depression, ADD    SI/HI and AVH/D (current and/or historical):   SI (current and/or historical): Pt Denied  Attempts (current and/or historical): Pt Denied  HI/Violence (current and/or historical): Pt Denied  Soni/AVH/D (current and/or historical): Pt Denied    Self-Harm (current and/or historical): Pt Denied    Outpatient Therapy (current and/or historical):  9th-12th grade (school counselor for anxiety/depression)    Outpatient Psychiatric Med Management (current and/or historical):  Dr. Jonatan Mcelroy [ PCP] ([facility]; Pediatrics); Dr. Bunny Philip [hx PCP] ([facility]; Internal Medicine); Dr. Chalino Kemp [ PCP] ([facility]; Internal Medicine); Dr. Shruti Sanders [PCP] ([facility]; Internal Medicine); and Dr. Ledy Zhou [psychiatrist] ([facility]; Outpatient Psychiatry)    Psychiatric Meds (per [facility] EMR/pt report; current and/or historical): Adderall XR (Amphetamine), Atarax/Vistaril (Hydroxyzine), Catapres, Kapvay (Clonidine), Focalin/Focalin XR (Dexmethylphenidate) , and Prozac (Fluoxetine)    Psychiatric Hospitalizations (current and/or historical): Pt Denied    Intensive Outpatient Program (current and/or historical): Pt Denied    Substance Use/Abuse (current " "and/or historical):   Vaping: Pt Denied/None Noted  Tobacco/Nicotine: Pt Denied/None Noted  Alcohol:  current occasional/once per 2-3 month use  Marijuana:  hx 3-4 times per weekly use "to help with anxiety and my ADD" (onset: 17yo; discontinued use: March 2023)  Other: Pt Denied/None Noted    Substance Abuse Rehabilitation (current and/or historical): Pt Denied    Occupation:  ([employer]; 3yrs)    Education Level: Currently Enrolled in [employer] - Bachelor's Degree: English ([employer])   Pt reported his goal is attain his bachelor's degree so that he can achieve a teacher certification.     Confucianism / Spiritual: Reared Anabaptism; Non-Practicing  Pt reported "I started seriously questioning stuff during high school and there was a lot of cognitive dissonance with it all." Pt noted "I was convinced for a year and a half during high school that I wanted to be a  because I thought it would give me an opportunity to share my take on Anabaptism doctrine but I decided against it when I graduated high school because my parents told me that I should go to a regular college." Pt reported "when I graduated high school, I stopped going to Adventism and I gradually stopped believing in anything after that." Pt endorsed "through a lot of reading and reflection on Anabaptism teachings, I saw God as more of a symbol or a metaphor for love, kindness, and goodness and Satan is more of a symbol or metaphor for evil rather than an actual entity." Pt reported "I've shared my beliefs with my dad and mom before but we try not to bring it up because we don't want to get in any arguments."     Residential: Lives with: Parents and Older Brother    Marital Status: Never   Relationship Quality: Not Applicable      Family:    Place of Birth/Reared[place]    Parent's Marital Status:   Mother Relationship Quality: Intact/Positive  Father Relationship " Quality: Intact/Positive  Mother ?: Pt Denied  Father ?:Pt Denied    Stepparents?:    Maternal Stepparents: Not Applicable   Paternal Stepparents: Not Applicable    Siblings (biological, half, step, and/or adopted):    Biological:  1 brother (3yrs older)  - Pt reported he and his older brother are very close    Paternal Half: Pt Denied   Maternal Half: Pt Denied   Maternal Stepsiblings: Pt Denied   Paternal Stepsiblings: Pt Denied   Adopted: Pt Denied    Children & Ages:    Daughters: Pt Denied   Sons: Pt Denied   Stepdaughters: Pt Denied   Stepsons: Pt Denied    Grandchildren & Ages:   Granddaughters: Pt Denied   Grandsons: Pt Denied    Family history of psychiatric illness/substance abuse:   Father:  Anxiety, Depression  Mother:  Anxiety, Depression  Sibling(s):  Anxiety, Depression  Maternal Grandmother: Pt Denied  Maternal Grandfather: Pt Denied  Paternal Grandmother: Pt Denied  Paternal Grandfather: Pt Denied  Child(melissa): Not Applicable    Trauma/Abuse/Neglect:   Physical: Pt Denied/None Noted  Verbal: Pt Denied/None Noted  Sexual: Pt Denied/None Noted  Other Trauma: Pt Denied/None Noted    Legal/Criminal Hx: Pt Denied/None Noted    GAD7 2023   1. Feeling nervous, anxious, or on edge? 1 2   2. Not being able to stop or control worrying? 1 1   3. Worrying too much about different things? 1 1   4. Trouble relaxing? 1 3   5. Being so restless that it is hard to sit still? 3 2   6. Becoming easily annoyed or irritable? 1 1   7. Feeling afraid as if something awful might happen? 0 2   WENDIE-7 Score 8 12     0-4 = Minimal anxiety  5-9 = Mild anxiety  10-14 = Moderate anxiety  15-21 = Severe anxiety     PHQ-9 Depression Patient Health Questionnaire 2023   Patient agreed to terms: Yes Yes   Little interest or pleasure in doing things 0 0   Feeling down, depressed, or hopeless 0 1   Trouble falling or staying asleep, or sleeping too much 1 3   Feeling tired or having little  energy 0 2   Poor appetite or overeating 1 2   Feeling bad about yourself - or that you are a failure or have let yourself or your family down 0 0   Trouble concentrating on things, such as reading the newspaper or watching television 0 2   Moving or speaking so slowly that other people could have noticed. Or the opposite - being so fidgety or restless that you have been moving around a lot more than usual 0 0   Thoughts that you would be better off dead, or of hurting yourself in some way 0 0   PHQ-9 Total Score 2 10   If you checked off any problems, how difficult have these problems made it for you to do your work, take care of things at home, or get along with other people? Not difficult at all Very difficult   Interpretation Minimal or None Moderate     0-4 = No intervention  5 to 9 = Mild  10 to 14 = Moderate  15 to 19 = Moderately severe  ?20 = Severe      Current medications and drug reactions (include OTC, herbal):   Outpatient Encounter Medications as of 5/9/2023   Medication Sig Dispense Refill    cloNIDine (CATAPRES) 0.1 MG tablet Take 1 tablet (0.1 mg total) by mouth every evening. 30 tablet 5    dexmethylphenidate (FOCALIN XR) 20 MG 24 hr capsule Take 1 capsule (20 mg total) by mouth once daily. 30 capsule 0    dicyclomine (BENTYL) 20 mg tablet Take 1 tablet (20 mg total) by mouth 3 (three) times daily as needed (abdominal pain). 90 tablet 11    FLUoxetine 20 MG capsule Take 1 capsule (20 mg total) by mouth once daily. 30 capsule 5    FLUoxetine 40 MG capsule Take 1 capsule (40 mg total) by mouth once daily. 90 capsule 1    fluticasone propionate (FLONASE) 50 mcg/actuation nasal spray 2 sprays (100 mcg total) by Each Nostril route once daily. 9.9 mL 0    hydrOXYzine pamoate (VISTARIL) 25 MG Cap Take 1 capsule (25 mg total) by mouth 3 (three) times daily as needed (anxiety). 90 capsule 2    ondansetron (ZOFRAN-ODT) 4 MG TbDL Take 4 mg by mouth 3 (three) times daily as needed.      [DISCONTINUED]  dexmethylphenidate (FOCALIN XR) 20 MG 24 hr capsule Take 1 capsule (20 mg total) by mouth once daily. 30 capsule 0    [DISCONTINUED] FLUoxetine 40 MG capsule Take 1 capsule (40 mg total) by mouth once daily. 90 capsule 1     No facility-administered encounter medications on file as of 5/9/2023.          Objective - Current Evaluation:     Mental Status Evaluation  Appearance: unremarkable, age appropriate  Behavior: normal, cooperative  Speech: normal tone, normal rate, normal pitch, normal volume  Mood: euthymic  Affect: congruent and appropriate  Thought Process: normal and logical  Thought Content: normal, no suicidality, no homicidality, delusions, or paranoia  Sensorium: grossly intact  Cognition: grossly intact  Insight: intact  Judgment: adequate to circumstances    Strengths and liabilities: Strength: Patient accepts guidance/feedback, Strength: Patient is expressive/articulate, Strength: Patient is intelligent, Strength: Patient is motivated for change, and Liability: Patient lacks coping skills      Diagnostic Impression - Plan:     1. Generalized anxiety disorder    2. Current moderate episode of major depressive disorder without prior episode    3. Attention deficit hyperactivity disorder (ADHD), combined type        Plan:individual psychotherapy      Return to Clinic: 2 weeks  Pt Reported to Schedule Self via Epic EMR MyChart Application and/or Department Support Staff         Tori Garcia LCSW  05/09/2023   10:00 AM

## 2023-05-09 ENCOUNTER — OFFICE VISIT (OUTPATIENT)
Dept: PSYCHIATRY | Facility: CLINIC | Age: 23
End: 2023-05-09
Payer: COMMERCIAL

## 2023-05-09 DIAGNOSIS — F32.1 CURRENT MODERATE EPISODE OF MAJOR DEPRESSIVE DISORDER WITHOUT PRIOR EPISODE: ICD-10-CM

## 2023-05-09 DIAGNOSIS — F90.2 ATTENTION DEFICIT HYPERACTIVITY DISORDER (ADHD), COMBINED TYPE: ICD-10-CM

## 2023-05-09 DIAGNOSIS — F41.1 GENERALIZED ANXIETY DISORDER: Primary | ICD-10-CM

## 2023-05-09 PROCEDURE — 90791 PSYCH DIAGNOSTIC EVALUATION: CPT | Mod: 95,,, | Performed by: SOCIAL WORKER

## 2023-05-09 PROCEDURE — 90791 PR PSYCHIATRIC DIAGNOSTIC EVALUATION: ICD-10-PCS | Mod: 95,,, | Performed by: SOCIAL WORKER

## 2023-05-18 RX ORDER — FLUOXETINE HYDROCHLORIDE 20 MG/1
CAPSULE ORAL
Qty: 90 CAPSULE | Refills: 1 | Status: SHIPPED | OUTPATIENT
Start: 2023-05-18 | End: 2023-06-08 | Stop reason: SDUPTHER

## 2023-05-25 NOTE — PROGRESS NOTES
Outpatient Psychiatry Initial Visit with MD    6/8/2023    IDENTIFYING DATA:  Name: Simon Goode  Occupation: online [employer] majoring in English one class.  and working  at [employer] class. 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 Technically employed .  Paid lot less. Currently for the summer - working technology maintenance  Lives at home with his parents and his older brother 25 years old.     Site:  [facility]    Simon Goode is a 22 y.o.  single male With a past psychiatric history of MDD, WENDIE, ADHD, inattentive type  Who presents for follow up.   Last seen on 4/26/2023  At that visit, these are the recommendations:  will increase Prozac 40mg morning to 60mg morning.   Will start vistaril 25mg TID prn anxiety.  will start Focalin XR 20mg   will start clonidine 0.1mg qhs   Will be seeing Tori Garcia in May .     History of Present Illness:     Clonidine helps with sleep.    Mostly he forget to take it.  Feels a lot better.    With focalin, Be able to get able to get a lot stuff better. Focus clearer.   He gets twitchy.   Bounciness always been there. Now on focalin, does not know if it worse. It is not to a significant degree.  Does not think it is a problem now.  Increase in the prozac - he has been feeling a lot better.   Not feeling as anxious  Not in school for the summer.  Not sure if he has enough money for next semester.  Will be transferring to a different program to an education program at [employer]. 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 When school start, para progession.  Rates depression as none.  Denies si/hi.  Denies a/v halluciantions.  Rates anxiety as mild   Feels it is manageable.  No panic attacks.   No side effects.   Still no facial and nose twitch.   Has not used the vistaril.   Normally at home he would have anxious thoughts, but not  having it now.   No new allergies. No new medical conditions. No new surgeries.  Since the last visit.     Mood - happy      PHQ-9 Depression Patient Health Questionnaire 6/8/2023   Over the last two weeks how often have you been bothered by little interest or pleasure in doing things 0   Over the last two weeks how often have you been bothered by feeling down, depressed or hopeless 0   Over the last two weeks how often have you been bothered by trouble falling or staying asleep, or sleeping too much 1   Over the last two weeks how often have you been bothered by feeling tired or having little energy 1   Over the last two weeks how often have you been bothered by a poor appetite or overeating 0   Over the last two weeks how often have you been bothered by feeling bad about yourself - or that you are a failure or have let yourself or your family down 0   Over the last two weeks how often have you been bothered by trouble concentrating on things, such as reading the newspaper or watching television 0   Over the last two weeks how often have you been bothered by moving or speaking so slowly that other people could have noticed. 0   Over the last two weeks how often have you been bothered by thoughts that you would be better off dead, or of hurting yourself 0   If you checked off any problems, how difficult have these problems made it for you to do your work, take care of things at home or get along with other people? Not difficult at all   Total Score 2       GAD7 6/8/2023   1. Feeling nervous, anxious, or on edge? 0   2. Not being able to stop or control worrying? 0   3. Worrying too much about different things? 0   4. Trouble relaxing? 0   5. Being so restless that it is hard to sit still? 0   6. Becoming easily annoyed or irritable? 0   7. Feeling afraid as if something awful might happen? 0   8. If you checked off any problems, how difficult have these problems made it for you to do your work, take care of things  at home, or get along with other people? 0   WENDIE-7 Score 0         Substance Use:   denies any eating disorders.  Occasionally  alcohol use once every 2-3 months.  confirms marijuana use in the past. Using for every other day for 1 month.  Daily user for since he was 16 years old. Quit 3 weeks ago. It might be contributing to depression/anxiety. Could be contributing to stomach issue. Mom has been concerned about it.  Notes he was using marijuana to help with anxiety. 6/8/2023 - no longer using  denies illicit drugs.  denies tobacco use.    Past Psychiatric History:   Previous Psychiatric Hospitalizations: No  Previous SI/HI: No  Previous Suicide Attempts: No  Psychiatric Care (current & past): Yes - possibly in 6th grade  History of Psychotherapy: Yes - last time in high school       Past Psychiatric Medication Trials: prozac focalin    Social History:     Raised by both parents.   Very happy childhood  1 older brother    Marital Status: single  Children: none  Education: currently at [employer]  Support Person: brother and mom    Current Living Circumstances: lives with his parents and older brother    Family Psychiatric History: mom -dad brother - grandmother - . Mom - used to take wellbutrin;  brother - wellbutrin and zoloft ;      Trauma History: denies    Legal History: denies        Current Medications:   Current Outpatient Medications   Medication Instructions    cloNIDine (CATAPRES) 0.1 mg, Oral, Nightly    dexmethylphenidate (FOCALIN XR) 20 mg, Oral, Daily    dicyclomine (BENTYL) 20 mg, Oral, 3 times daily PRN    FLUoxetine 20 MG capsule TAKE 1 CAPSULE BY MOUTH EVERY DAY    FLUoxetine 40 mg, Oral, Daily    fluticasone propionate (FLONASE) 100 mcg, Each Nostril, Daily    hydrOXYzine pamoate (VISTARIL) 25 mg, Oral, 3 times daily PRN    ondansetron (ZOFRAN-ODT) 4 mg, Oral, 3 times daily PRN       Allergies:   Review of patient's allergies indicates:  No Known Allergies    Review Of Systems:     General :  NO chills or fever  Eyes: NO  visual changes  ENT: NO hearing change, nasal discharge or sore throat  Endocrine: NO weight changes or polydipsia/polyuria  Dermatological: NO rashes  Respiratory: NO cough, shortness of breath  Cardiovascular: NO chest pain, palpitations or racing heart  Gastrointestinal: NO nausea, vomiting, constipation or diarrhea  Musculoskeletal: NO muscle pain or stiffness  Neurological: NO confusion, dizziness, headaches or tremors  Psychiatric: please see HPI    Past Medical History:     Past Medical History:   Diagnosis Date    ADHD (attention deficit hyperactivity disorder)     Attention deficit disorder 11/8/2019    Diagnosed in middle school. Reports h/o seeing psychology or psychiatry.     Chronic constipation     Chronic diarrhea     Depression     GERD (gastroesophageal reflux disease)          Past Surgical History:      has no past surgical history on file.    Birth and Developmental History:     Evaluated and found noncontributory.    Current Evaluation:       Constitutional  Vitals:  Vitals:    06/08/23 0955   BP: 136/83   BP Location: Left arm   Pulse: 81   Weight: 103 kg (226 lb 15.4 oz)         General:  unremarkable, age appropriate, overweight     Musculoskeletal  Muscle Strength/Tone:  no tremor, no tic   Gait & Station:  non-ataxic     Mental Status Exam:  Comment: shoulder length curly hair. Fair eye contact. Legs jittery.   Appearance: of stated age Casually dressed  Behavior:  cooperative   Psychomotor: Restless & fidgety   Speech:  normal rate, rhythm and volume  Mood:  happy   Affect:  appropriate  Thought Process:  within normal limits  Associations: intact  Thought Content:  no si/hi.   Thought Perceptions: no a/v hallucinations  Memory:  Intact  Attention Span and Concentration:  Normal  Fund of Knowledge:  Intact  Estimate of Intelligence:  Average   Language: no abnormalities  Insight/Judgement:  Fair  Cognition:  grossly intact  Orientation:  person, place, time,  and situation    LABORATORY DATA  No visits with results within 1 Month(s) from this visit.   Latest known visit with results is:   Lab Visit on 11/29/2022   Component Date Value Ref Range Status    TSH 11/29/2022 0.941  0.400 - 4.000 uIU/mL Final    Sodium 11/29/2022 140  136 - 145 mmol/L Final    Potassium 11/29/2022 4.4  3.5 - 5.1 mmol/L Final    Chloride 11/29/2022 106  95 - 110 mmol/L Final    CO2 11/29/2022 26  23 - 29 mmol/L Final    Glucose 11/29/2022 93  70 - 110 mg/dL Final    BUN 11/29/2022 17  6 - 20 mg/dL Final    Creatinine 11/29/2022 0.9  0.5 - 1.4 mg/dL Final    Calcium 11/29/2022 9.8  8.7 - 10.5 mg/dL Final    Total Protein 11/29/2022 6.8  6.0 - 8.4 g/dL Final    Albumin 11/29/2022 4.1  3.5 - 5.2 g/dL Final    Total Bilirubin 11/29/2022 0.5  0.1 - 1.0 mg/dL Final    Alkaline Phosphatase 11/29/2022 51 (L)  55 - 135 U/L Final    AST 11/29/2022 25  10 - 40 U/L Final    ALT 11/29/2022 50 (H)  10 - 44 U/L Final    Anion Gap 11/29/2022 8  8 - 16 mmol/L Final    eGFR 11/29/2022 >60.0  >60 mL/min/1.73 m^2 Final    WBC 11/29/2022 4.59  3.90 - 12.70 K/uL Final    RBC 11/29/2022 4.81  4.60 - 6.20 M/uL Final    Hemoglobin 11/29/2022 14.6  14.0 - 18.0 g/dL Final    Hematocrit 11/29/2022 42.8  40.0 - 54.0 % Final    MCV 11/29/2022 89  82 - 98 fL Final    MCH 11/29/2022 30.4  27.0 - 31.0 pg Final    MCHC 11/29/2022 34.1  32.0 - 36.0 g/dL Final    RDW 11/29/2022 12.1  11.5 - 14.5 % Final    Platelets 11/29/2022 217  150 - 450 K/uL Final    MPV 11/29/2022 12.0  9.2 - 12.9 fL Final    Immature Granulocytes 11/29/2022 0.2  0.0 - 0.5 % Final    Gran # (ANC) 11/29/2022 2.0  1.8 - 7.7 K/uL Final    Immature Grans (Abs) 11/29/2022 0.01  0.00 - 0.04 K/uL Final    Lymph # 11/29/2022 1.9  1.0 - 4.8 K/uL Final    Mono # 11/29/2022 0.6  0.3 - 1.0 K/uL Final    Eos # 11/29/2022 0.1  0.0 - 0.5 K/uL Final    Baso # 11/29/2022 0.01  0.00 - 0.20 K/uL Final    nRBC 11/29/2022 0  0 /100 WBC Final    Gran % 11/29/2022 43.8  38.0 -  73.0 % Final    Lymph % 11/29/2022 42.3  18.0 - 48.0 % Final    Mono % 11/29/2022 12.0  4.0 - 15.0 % Final    Eosinophil % 11/29/2022 1.5  0.0 - 8.0 % Final    Basophil % 11/29/2022 0.2  0.0 - 1.9 % Final    Differential Method 11/29/2022 Automated   Final              Assessment - Diagnosis - Goals:     Assessment and Diagnosis     Status/Progress: Based on the examination today, the patient's problem(s) is/are improving. New problems have not presented today. Co-morbidities are not complicating management of the primary condition. There are not active rule-out diagnoses for this patient at this time.       1. Attention deficit hyperactivity disorder (ADHD), predominantly hyperactive type  dexmethylphenidate (FOCALIN XR) 20 MG 24 hr capsule    dexmethylphenidate (FOCALIN XR) 20 MG 24 hr capsule    dexmethylphenidate (FOCALIN XR) 20 MG 24 hr capsule      2. Current moderate episode of major depressive disorder without prior episode  FLUoxetine 40 MG capsule      3. Generalized anxiety disorder               Interventions/Recommendations/Plan:    PROBLEM:anxiety  COMMENT:improved  PLAN:will continue Prozac  60mg morning.   Will continue vistaril 25mg TID prn anxiety.  Will be seeing Tori Garcia in May .     PROBLEM:depression  COMMENT:manageable  PLAN:will continue Prozac  60mg morning.     PROBLEM:adhd  COMMENT:improved  PLAN:will continue Focalin XR 20mg       PROBLEM:sleep  COMMENT:baseline  PLAN:will continue clonidine 0.1mg qhs     PROBLEM:physical  (upset stomach, sweating, nausea)  COMMENT:baseline  PLAN:see plan above        Psychotherapy: None at this time    Return to Clinic: prn      SAFETY: plan discussed with patient. Abstain from drug/etoh/tobacco use. Patient to have no access to guns/ weapons. If any suicidal or homicidal ideation or plan, or new or worsening symptoms, call 911/go to ED. Risks, benefits , and alternatives to treatment discussed with patient and guardian who demonstrated  understanding and agreement and chose to treat. Patient will call if any questions or concerns. Continue regular follow up with PCP for all non-psychiatric medical conditions.        Lanhuong Nguyen DO [facility]    Dr. Zhou has informed patient/guardian that she is leaving Sept 6, 2023 and has given patient/guardian a list of psychiatrists in the community.

## 2023-06-08 ENCOUNTER — OFFICE VISIT (OUTPATIENT)
Dept: PSYCHIATRY | Facility: CLINIC | Age: 23
End: 2023-06-08
Payer: COMMERCIAL

## 2023-06-08 VITALS
WEIGHT: 226.94 LBS | DIASTOLIC BLOOD PRESSURE: 83 MMHG | BODY MASS INDEX: 31.66 KG/M2 | SYSTOLIC BLOOD PRESSURE: 136 MMHG | HEART RATE: 81 BPM

## 2023-06-08 DIAGNOSIS — F41.1 GENERALIZED ANXIETY DISORDER: ICD-10-CM

## 2023-06-08 DIAGNOSIS — F32.1 CURRENT MODERATE EPISODE OF MAJOR DEPRESSIVE DISORDER WITHOUT PRIOR EPISODE: ICD-10-CM

## 2023-06-08 DIAGNOSIS — F90.1 ATTENTION DEFICIT HYPERACTIVITY DISORDER (ADHD), PREDOMINANTLY HYPERACTIVE TYPE: Primary | ICD-10-CM

## 2023-06-08 PROCEDURE — 3079F DIAST BP 80-89 MM HG: CPT | Mod: CPTII,S$GLB,, | Performed by: PSYCHIATRY & NEUROLOGY

## 2023-06-08 PROCEDURE — 99214 OFFICE O/P EST MOD 30 MIN: CPT | Mod: S$GLB,,, | Performed by: PSYCHIATRY & NEUROLOGY

## 2023-06-08 PROCEDURE — 99999 PR PBB SHADOW E&M-EST. PATIENT-LVL II: CPT | Mod: PBBFAC,,, | Performed by: PSYCHIATRY & NEUROLOGY

## 2023-06-08 PROCEDURE — 3008F BODY MASS INDEX DOCD: CPT | Mod: CPTII,S$GLB,, | Performed by: PSYCHIATRY & NEUROLOGY

## 2023-06-08 PROCEDURE — 3075F SYST BP GE 130 - 139MM HG: CPT | Mod: CPTII,S$GLB,, | Performed by: PSYCHIATRY & NEUROLOGY

## 2023-06-08 PROCEDURE — 3075F PR MOST RECENT SYSTOLIC BLOOD PRESS GE 130-139MM HG: ICD-10-PCS | Mod: CPTII,S$GLB,, | Performed by: PSYCHIATRY & NEUROLOGY

## 2023-06-08 PROCEDURE — 99999 PR PBB SHADOW E&M-EST. PATIENT-LVL II: ICD-10-PCS | Mod: PBBFAC,,, | Performed by: PSYCHIATRY & NEUROLOGY

## 2023-06-08 PROCEDURE — 99214 PR OFFICE/OUTPT VISIT, EST, LEVL IV, 30-39 MIN: ICD-10-PCS | Mod: S$GLB,,, | Performed by: PSYCHIATRY & NEUROLOGY

## 2023-06-08 PROCEDURE — 3079F PR MOST RECENT DIASTOLIC BLOOD PRESSURE 80-89 MM HG: ICD-10-PCS | Mod: CPTII,S$GLB,, | Performed by: PSYCHIATRY & NEUROLOGY

## 2023-06-08 PROCEDURE — 3008F PR BODY MASS INDEX (BMI) DOCUMENTED: ICD-10-PCS | Mod: CPTII,S$GLB,, | Performed by: PSYCHIATRY & NEUROLOGY

## 2023-06-08 RX ORDER — FLUOXETINE HYDROCHLORIDE 40 MG/1
40 CAPSULE ORAL DAILY
Qty: 90 CAPSULE | Refills: 3 | Status: SHIPPED | OUTPATIENT
Start: 2023-06-08 | End: 2024-02-15 | Stop reason: SDUPTHER

## 2023-06-08 RX ORDER — CLONIDINE HYDROCHLORIDE 0.1 MG/1
0.1 TABLET ORAL NIGHTLY
Qty: 90 TABLET | Refills: 3 | Status: SHIPPED | OUTPATIENT
Start: 2023-06-08 | End: 2024-02-15 | Stop reason: SDUPTHER

## 2023-06-08 RX ORDER — DEXMETHYLPHENIDATE HYDROCHLORIDE 20 MG/1
20 CAPSULE, EXTENDED RELEASE ORAL DAILY
Qty: 30 CAPSULE | Refills: 0 | Status: SHIPPED | OUTPATIENT
Start: 2023-08-07 | End: 2023-09-06

## 2023-06-08 RX ORDER — FLUOXETINE HYDROCHLORIDE 20 MG/1
20 CAPSULE ORAL DAILY
Qty: 90 CAPSULE | Refills: 3 | Status: SHIPPED | OUTPATIENT
Start: 2023-06-08 | End: 2024-02-15 | Stop reason: SDUPTHER

## 2023-06-08 RX ORDER — DEXMETHYLPHENIDATE HYDROCHLORIDE 20 MG/1
20 CAPSULE, EXTENDED RELEASE ORAL DAILY
Qty: 30 CAPSULE | Refills: 0 | Status: SHIPPED | OUTPATIENT
Start: 2023-07-08 | End: 2023-09-05

## 2023-06-08 RX ORDER — DEXMETHYLPHENIDATE HYDROCHLORIDE 20 MG/1
20 CAPSULE, EXTENDED RELEASE ORAL DAILY
Qty: 30 CAPSULE | Refills: 0 | Status: SHIPPED | OUTPATIENT
Start: 2023-06-08 | End: 2023-07-08

## 2023-06-08 NOTE — PATIENT INSTRUCTIONS
List of psychiatrists in the community:    1. [facility]   [address],   [place] [address]    [email or web address]      2. [facility] [place] [facility]  [address] [place] [address]    [email or web address]    3. [facility]  [address] [place] [address]    [email or web address]    4. [facility]  [address]   [place] [address]    [email or web address]    5. [facility]  [address] [place] [address]      6. [facility]  [address] [place] [address]    [email or web address]    7. VALE Justin JR., MD  [address], [place] [address]  [phone]

## 2023-07-21 ENCOUNTER — PATIENT MESSAGE (OUTPATIENT)
Dept: PSYCHIATRY | Facility: CLINIC | Age: 23
End: 2023-07-21
Payer: COMMERCIAL

## 2023-07-24 RX ORDER — HYDROXYZINE PAMOATE 25 MG/1
25 CAPSULE ORAL 3 TIMES DAILY PRN
Qty: 270 CAPSULE | Refills: 1 | Status: SHIPPED | OUTPATIENT
Start: 2023-07-24 | End: 2024-02-15

## 2023-08-09 ENCOUNTER — PATIENT MESSAGE (OUTPATIENT)
Dept: PSYCHIATRY | Facility: CLINIC | Age: 23
End: 2023-08-09
Payer: COMMERCIAL

## 2023-08-22 NOTE — PROGRESS NOTES
Outpatient Psychiatry Initial Visit with MD    9/5/2023    IDENTIFYING DATA:  Name: Simon Goode  Occupation: [employer] majoring in English one class.  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 Technically employed .  Paid lot less. Currently for the summer - working technology maintenance  Lives at home with his parents and his older brother 25 years old.     Site:  [facility]    Simon Goode is a 23 y.o.  single male With a past psychiatric history of MDD, WENDIE, ADHD, inattentive type  Who presents for follow up.   Last seen on 6/8/2023   At that visit, these are the recommendations:  will continue Prozac 60mg morning.   Will continue vistaril 25mg TID prn anxiety.  will continue Focalin XR 20mg   will continue clonidine 0.1mg qhs   Will be seeing Tori Garcia in May .     History of Present Illness:     Doing great.   Still all his medications.  Only thing, he does not take the focalin on the weekends. Then when he starts back the first day, he gets headaches.  Drink coffee when he is taking the focalin on the weekdays.  But on the weekends, he does not drink coffee.  He is taking this semester off.  Will enrolled in school. next semester in [email or web address].   Still at [employer]. Going well. 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   This year, he is technology facilitator.  Paying a little more.  No depression.  Denies si/hi. Denies a/v hallucinations.  No anxiety.   Been taking the clonidine .   When he does not take it or forget,   it is hard to sleep.   Appetite during the day is fine.   No new allergies. No new medical conditions. No new surgeries.  Since the last visit.     Has not notice leg bouncing.   All those stuff have calm down.   Normally at home he would have anxious thoughts, but not having it now.   Mood -  good.              9/5/2023   PHQ9    Little interest or pleasure in doing things 0    Feeling down, depressed, or hopeless 0    Trouble falling or staying asleep, or sleeping too much 0    Feeling tired or having little energy 0    Poor appetite or overeating 0    Feeling bad about yourself - or that you are a failure or have let yourself or your family down 0    Trouble concentrating on things, such as reading the newspaper or watching television 0    Moving or speaking so slowly that other people could have noticed. Or the opposite - being so fidgety or restless that you have been moving around a lot more than usual 0    Thoughts that you would be better off dead, or of hurting yourself in some way 0    If you checked off any problems, how difficult have these problems made it for you to do your work, take care of things at home, or get along with other people? Not difficult at all    PHQ-9 Total Score 0           9/5/2023   WENDIE-7    1. Feeling nervous, anxious, or on edge? Not at all    2. Not being able to stop or control worrying? Not at all    3. Worrying too much about different things? Not at all    4. Trouble relaxing? Not at all    5. Being so restless that it is hard to sit still? Not at all    6. Becoming easily annoyed or irritable? Not at all    7. Feeling afraid as if something awful might happen? Not at all    8. If you checked off any problems, how difficult have these problems made it for you to do your work, take care of things at home, or get along with other people?    WENDIE-7 Score 0    Number answered (out of first 7) 7    Interpretation Normal          Substance Use:   denies any eating disorders.  Occasionally  alcohol use once every 2-3 months.  confirms marijuana use in the past. Using for every other day for 1 month.  Daily user for since he was 16 years old. Quit 3 weeks ago. It might be contributing to depression/anxiety. Could be contributing to stomach issue. Mom has been concerned  about it.  Notes he was using marijuana to help with anxiety. 6/8/2023 - no longer using  denies illicit drugs.  denies tobacco use.    Past Psychiatric History:   Previous Psychiatric Hospitalizations: No  Previous SI/HI: No  Previous Suicide Attempts: No  Psychiatric Care (current & past): Yes - possibly in 6th grade  History of Psychotherapy: Yes - last time in high school       Past Psychiatric Medication Trials: prozac, focalin    Social History:     Raised by both parents.   Very happy childhood  1 older brother    Marital Status: single  Children: none  Education: currently at [employer]  Support Person: brother and mom    Current Living Circumstances: lives with his parents and older brother    Family Psychiatric History: mom -dad brother - grandmother - . Mom - used to take wellbutrin;  brother - wellbutrin and zoloft ;      Trauma History: denies    Legal History: denies        Current Medications:   Current Outpatient Medications   Medication Instructions    cloNIDine (CATAPRES) 0.1 mg, Oral, Nightly    dexmethylphenidate (FOCALIN XR) 20 mg, Oral, Daily    dicyclomine (BENTYL) 20 mg, Oral, 3 times daily PRN    FLUoxetine 20 mg, Oral, Daily    FLUoxetine 40 mg, Oral, Daily    fluticasone propionate (FLONASE) 100 mcg, Each Nostril, Daily    hydrOXYzine pamoate (VISTARIL) 25 mg, Oral, 3 times daily PRN    ondansetron (ZOFRAN-ODT) 4 mg, Oral, 3 times daily PRN       Allergies:   Review of patient's allergies indicates:  No Known Allergies    Review Of Systems:     General : NO chills or fever  Eyes: NO  visual changes  ENT: NO hearing change, nasal discharge or sore throat  Endocrine: NO weight changes or polydipsia/polyuria  Dermatological: NO rashes  Respiratory: NO cough, shortness of breath  Cardiovascular: NO chest pain, palpitations or racing heart  Gastrointestinal: NO nausea, vomiting, constipation or diarrhea  Musculoskeletal: NO muscle pain or stiffness  Neurological: NO confusion, dizziness,  headaches or tremors  Psychiatric: please see HPI    Past Medical History:     Past Medical History:   Diagnosis Date    ADHD (attention deficit hyperactivity disorder)     Attention deficit disorder 11/8/2019    Diagnosed in middle school. Reports h/o seeing psychology or psychiatry.     Chronic constipation     Chronic diarrhea     Depression     GERD (gastroesophageal reflux disease)          Past Surgical History:      has no past surgical history on file.    Birth and Developmental History:     Evaluated and found noncontributory.    Current Evaluation:       Constitutional  Vitals:  There were no vitals filed for this visit.        General:  unremarkable, age appropriate, overweight     Musculoskeletal  Muscle Strength/Tone:  no tremor, no tic   Gait & Station:  non-ataxic     Mental Status Exam:  Comment: shoulder length curly hair. Fair eye contact. Legs jittery.   Appearance: of stated age Casually dressed  Behavior:  cooperative   Psychomotor: Restless & fidgety   Speech:  normal rate, rhythm and volume  Mood:  good   Affect:  appropriate  Thought Process:  within normal limits  Associations: intact  Thought Content:  no si/hi.   Thought Perceptions: no a/v hallucinations  Memory:  Intact  Attention Span and Concentration:  Normal  Fund of Knowledge:  Intact  Estimate of Intelligence:  Average   Language: no abnormalities  Insight/Judgement:  Fair  Cognition:  grossly intact  Orientation:  person, place, time, and situation    LABORATORY DATA  No visits with results within 1 Month(s) from this visit.   Latest known visit with results is:   Lab Visit on 11/29/2022   Component Date Value Ref Range Status    TSH 11/29/2022 0.941  0.400 - 4.000 uIU/mL Final    Sodium 11/29/2022 140  136 - 145 mmol/L Final    Potassium 11/29/2022 4.4  3.5 - 5.1 mmol/L Final    Chloride 11/29/2022 106  95 - 110 mmol/L Final    CO2 11/29/2022 26  23 - 29 mmol/L Final    Glucose 11/29/2022 93  70 - 110 mg/dL Final    BUN  11/29/2022 17  6 - 20 mg/dL Final    Creatinine 11/29/2022 0.9  0.5 - 1.4 mg/dL Final    Calcium 11/29/2022 9.8  8.7 - 10.5 mg/dL Final    Total Protein 11/29/2022 6.8  6.0 - 8.4 g/dL Final    Albumin 11/29/2022 4.1  3.5 - 5.2 g/dL Final    Total Bilirubin 11/29/2022 0.5  0.1 - 1.0 mg/dL Final    Alkaline Phosphatase 11/29/2022 51 (L)  55 - 135 U/L Final    AST 11/29/2022 25  10 - 40 U/L Final    ALT 11/29/2022 50 (H)  10 - 44 U/L Final    Anion Gap 11/29/2022 8  8 - 16 mmol/L Final    eGFR 11/29/2022 >60.0  >60 mL/min/1.73 m^2 Final    WBC 11/29/2022 4.59  3.90 - 12.70 K/uL Final    RBC 11/29/2022 4.81  4.60 - 6.20 M/uL Final    Hemoglobin 11/29/2022 14.6  14.0 - 18.0 g/dL Final    Hematocrit 11/29/2022 42.8  40.0 - 54.0 % Final    MCV 11/29/2022 89  82 - 98 fL Final    MCH 11/29/2022 30.4  27.0 - 31.0 pg Final    MCHC 11/29/2022 34.1  32.0 - 36.0 g/dL Final    RDW 11/29/2022 12.1  11.5 - 14.5 % Final    Platelets 11/29/2022 217  150 - 450 K/uL Final    MPV 11/29/2022 12.0  9.2 - 12.9 fL Final    Immature Granulocytes 11/29/2022 0.2  0.0 - 0.5 % Final    Gran # (ANC) 11/29/2022 2.0  1.8 - 7.7 K/uL Final    Immature Grans (Abs) 11/29/2022 0.01  0.00 - 0.04 K/uL Final    Lymph # 11/29/2022 1.9  1.0 - 4.8 K/uL Final    Mono # 11/29/2022 0.6  0.3 - 1.0 K/uL Final    Eos # 11/29/2022 0.1  0.0 - 0.5 K/uL Final    Baso # 11/29/2022 0.01  0.00 - 0.20 K/uL Final    nRBC 11/29/2022 0  0 /100 WBC Final    Gran % 11/29/2022 43.8  38.0 - 73.0 % Final    Lymph % 11/29/2022 42.3  18.0 - 48.0 % Final    Mono % 11/29/2022 12.0  4.0 - 15.0 % Final    Eosinophil % 11/29/2022 1.5  0.0 - 8.0 % Final    Basophil % 11/29/2022 0.2  0.0 - 1.9 % Final    Differential Method 11/29/2022 Automated   Final              Assessment - Diagnosis - Goals:     Assessment and Diagnosis     Status/Progress: Based on the examination today, the patient's problem(s) is/are improving. New problems have not presented today. Co-morbidities are not  complicating management of the primary condition. There are not active rule-out diagnoses for this patient at this time.       1. Attention deficit hyperactivity disorder (ADHD), predominantly inattentive type  dexmethylphenidate (FOCALIN XR) 20 MG 24 hr capsule    dexmethylphenidate (FOCALIN XR) 20 MG 24 hr capsule    dexmethylphenidate (FOCALIN XR) 20 MG 24 hr capsule      2. Generalized anxiety disorder        3. Current moderate episode of major depressive disorder without prior episode                 Interventions/Recommendations/Plan:    PROBLEM:anxiety  COMMENT:improved  PLAN:will continue Prozac  60mg morning.   Will continue vistaril 25mg TID prn anxiety.  Will be seeing Tori Garcia in May .     PROBLEM:depression  COMMENT:manageable  PLAN:will continue Prozac  60mg morning.     PROBLEM:adhd  COMMENT:improved  PLAN:will continue Focalin XR 20mg     PROBLEM:sleep  COMMENT:improved  PLAN:will continue clonidine 0.1mg qhs         Return to Clinic: with new provider      SAFETY: plan discussed with patient. Abstain from drug/etoh/tobacco use. Patient to have no access to guns/ weapons. If any suicidal or homicidal ideation or plan, or new or worsening symptoms, call 911/go to ED. Risks, benefits , and alternatives to treatment discussed with patient and guardian who demonstrated understanding and agreement and chose to treat. Patient will call if any questions or concerns. Continue regular follow up with PCP for all non-psychiatric medical conditions.        Lanhuong Nguyen DO [facility]

## 2023-09-05 ENCOUNTER — OFFICE VISIT (OUTPATIENT)
Dept: PSYCHIATRY | Facility: CLINIC | Age: 23
End: 2023-09-05
Payer: COMMERCIAL

## 2023-09-05 VITALS — SYSTOLIC BLOOD PRESSURE: 153 MMHG | HEART RATE: 93 BPM | DIASTOLIC BLOOD PRESSURE: 89 MMHG

## 2023-09-05 DIAGNOSIS — F32.1 CURRENT MODERATE EPISODE OF MAJOR DEPRESSIVE DISORDER WITHOUT PRIOR EPISODE: ICD-10-CM

## 2023-09-05 DIAGNOSIS — F41.1 GENERALIZED ANXIETY DISORDER: ICD-10-CM

## 2023-09-05 DIAGNOSIS — F90.0 ATTENTION DEFICIT HYPERACTIVITY DISORDER (ADHD), PREDOMINANTLY INATTENTIVE TYPE: Primary | ICD-10-CM

## 2023-09-05 PROCEDURE — 3077F SYST BP >= 140 MM HG: CPT | Mod: CPTII,S$GLB,, | Performed by: PSYCHIATRY & NEUROLOGY

## 2023-09-05 PROCEDURE — 99999 PR PBB SHADOW E&M-EST. PATIENT-LVL II: CPT | Mod: PBBFAC,,, | Performed by: PSYCHIATRY & NEUROLOGY

## 2023-09-05 PROCEDURE — 3077F PR MOST RECENT SYSTOLIC BLOOD PRESSURE >= 140 MM HG: ICD-10-PCS | Mod: CPTII,S$GLB,, | Performed by: PSYCHIATRY & NEUROLOGY

## 2023-09-05 PROCEDURE — 99214 PR OFFICE/OUTPT VISIT, EST, LEVL IV, 30-39 MIN: ICD-10-PCS | Mod: S$GLB,,, | Performed by: PSYCHIATRY & NEUROLOGY

## 2023-09-05 PROCEDURE — 3079F PR MOST RECENT DIASTOLIC BLOOD PRESSURE 80-89 MM HG: ICD-10-PCS | Mod: CPTII,S$GLB,, | Performed by: PSYCHIATRY & NEUROLOGY

## 2023-09-05 PROCEDURE — 99214 OFFICE O/P EST MOD 30 MIN: CPT | Mod: S$GLB,,, | Performed by: PSYCHIATRY & NEUROLOGY

## 2023-09-05 PROCEDURE — 3079F DIAST BP 80-89 MM HG: CPT | Mod: CPTII,S$GLB,, | Performed by: PSYCHIATRY & NEUROLOGY

## 2023-09-05 PROCEDURE — 99999 PR PBB SHADOW E&M-EST. PATIENT-LVL II: ICD-10-PCS | Mod: PBBFAC,,, | Performed by: PSYCHIATRY & NEUROLOGY

## 2023-09-05 RX ORDER — DEXMETHYLPHENIDATE HYDROCHLORIDE 20 MG/1
20 CAPSULE, EXTENDED RELEASE ORAL DAILY
Qty: 30 CAPSULE | Refills: 0 | Status: SHIPPED | OUTPATIENT
Start: 2023-10-05 | End: 2023-11-04

## 2023-09-05 RX ORDER — DEXMETHYLPHENIDATE HYDROCHLORIDE 20 MG/1
20 CAPSULE, EXTENDED RELEASE ORAL DAILY
Qty: 30 CAPSULE | Refills: 0 | Status: SHIPPED | OUTPATIENT
Start: 2023-11-04 | End: 2024-01-31 | Stop reason: SDUPTHER

## 2023-09-05 RX ORDER — DEXMETHYLPHENIDATE HYDROCHLORIDE 20 MG/1
20 CAPSULE, EXTENDED RELEASE ORAL DAILY
Qty: 30 CAPSULE | Refills: 0 | Status: SHIPPED | OUTPATIENT
Start: 2023-09-05 | End: 2023-10-05

## 2023-09-06 NOTE — PROGRESS NOTES
Individual Psychotherapy Follow-up Visit Progress Note (PhD/LCSW)     Outpatient Psychotherapy - 30 minutes with patient (16-37 minutes) - [id number]    Date: 9/11/2023    Visit Type: Telehealth    Due to the nature of this visit type, a virtual visit with synchronous audio and video, each patient to whom this provider administers behavioral health services by telemedicine is: (1) informed of the relationship between the provider and patient and the respective role of any other health care provider with respect to management of the patient; and (2) notified that he or she may decline to receive services by telemedicine and may withdraw from such care at any time. If technological issues occur, at the professional discretion of the clinical provider, synchronous audio only services may be utilized after unsuccessful attempt(s) to connect via audiovisual services; similarly, if audio only visit occurs, patient's verbal consent will be obtained prior to receipt of service. Prevailing clinical standards of care are upheld despite service methodology; having said this, if the clinical provider is unable to meet the prevailing standards of care, the patient will be rescheduled for the provider's soonest availability - as clinically appropriate.     The patient was informed of the following:     Provider's contact info:  [facility]  [address], [address], [address]  [place] [address]  (Phone) [phone]    If technology issues occur, call office phone: Ph: [phone]  If crisis: Dial 911 or go to nearest Emergency Room (ER)  If questions related to privacy practices: contact [facility] Information Department: [phone]    For security purposes, the pt identified that they were at [address] [place] [address] during today's session and contact number is [phone].    The pt's emergency contact(s) is Extended Emergency Contact Information  Primary  "Emergency Contact: MARIO GOODE  Address: [address] [place] [address] [place]  Home Phone: [phone]  Relation: Mother   needed? No.    Crisis Disclaimer: Patient was informed that due to the virtual nature of the visit, that if a crisis develops, protocols will be implemented to ensure patient safety, including but not limited to: 1) Initiating a welfare check with local law enforcement and/or 2) Calling 911    9/11/2023  MRN: [id number]  Primary Care Provider: Shruti Sanders MD    Simon Goode is a 23 y.o. male who presents today for follow-up of depression and anxiety. Met with patient.      Preferred Name: Simon   Transgender Identity Form    Gender Identity Form  Gender Identity: cisgender male  Sex at Birth: male  Patient Pronouns: he/him/his  Transition Summary         Subjective:     Last encounter (with this provider): 5/9/2023     Content of Current Session: Pt reported, "I'm doing great" upon entry to this session. LCSW utilized active listening/reflection to engage with pt and review experiences since their last session with this provider. LCSW utilized cognitive processing and supportive therapy to review his historical experiences. 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 Pt reported "I'm doing really well - everything is going great." Pt reported his appetite, sleep, moods, and anxiety had dissipated since his last session with this provider. Pt ended session early due to  his report of unremarkable psychiatric experiences since his last session with this provider - pt reinforced with report of  "overall, I don't have anything to talk about, I'm doing really well." Pt responded appropriately to aforementioned interventions. Pt denied SI/HI/AVH. Pt requested a referral be submitted for a prescriber transfer as his historical psychiatrist (Dr. Ledy Zhou) had recently resigned - LCSW submitted order and " referral message, in session, to dept support staff for scheduling as necessary.    Therapeutic Interventions Utilized During Current Session: Cognitive Processing Therapy, Supportive Therapy        9/11/2023     4:34 PM 9/5/2023     8:53 AM 6/8/2023    10:27 AM 5/9/2023     9:53 AM 4/26/2023     7:47 AM   GAD7   1. Feeling nervous, anxious, or on edge? 0 0 0 1 2   2. Not being able to stop or control worrying? 0 0 0 1 1   3. Worrying too much about different things? 0 0 0 1 1   4. Trouble relaxing? 0 0 0 1 3   5. Being so restless that it is hard to sit still? 0 0 0 3 2   6. Becoming easily annoyed or irritable? 0 0 0 1 1   7. Feeling afraid as if something awful might happen? 0 0 0 0 2   8. If you checked off any problems, how difficult have these problems made it for you to do your work, take care of things at home, or get along with other people?   0     WENDIE-7 Score 0 0 0 8 12      0-4 = Minimal anxiety  5-9 = Mild anxiety  10-14 = Moderate anxiety  15-21 = Severe anxiety         9/11/2023     4:34 PM 9/5/2023     8:56 AM 5/9/2023     9:56 AM 4/26/2023     7:53 AM   PHQ-9 Depression Patient Health Questionnaire   Patient agreed to terms: Yes Yes Yes Yes   Little interest or pleasure in doing things 0 0 0 0   Feeling down, depressed, or hopeless 0 0 0 1   Trouble falling or staying asleep, or sleeping too much 0 0 1 3   Feeling tired or having little energy 0 0 0 2   Poor appetite or overeating 0 0 1 2   Feeling bad about yourself - or that you are a failure or have let yourself or your family down 0 0 0 0   Trouble concentrating on things, such as reading the newspaper or watching television 0 0 0 2   Moving or speaking so slowly that other people could have noticed. Or the opposite - being so fidgety or restless that you have been moving around a lot more than usual 0 0 0 0   Thoughts that you would be better off dead, or of hurting yourself in some way 0 0 0 0   PHQ-9 Total Score 0 0 2 10   If you checked off  any problems, how difficult have these problems made it for you to do your work, take care of things at home, or get along with other people? Not difficult at all Not difficult at all Not difficult at all Very difficult   Interpretation Minimal or None Minimal or None Minimal or None Moderate     0-4 = No intervention  5 to 9 = Mild  10 to 14 = Moderate  15 to 19 = Moderately severe  ?20 = Severe      Objective:       Mental Status Evaluation  Appearance: unremarkable, age appropriate  Behavior: normal, cooperative  Speech: normal tone, normal rate, normal pitch, normal volume  Mood: euthymic  Affect: congruent and appropriate  Thought Process: normal and logical  Thought Content: normal, no suicidality, no homicidality, delusions, or paranoia  Sensorium: grossly intact  Cognition: grossly intact  Insight: intact  Judgment: adequate to circumstances    Risk parameters:  Patient reports no suicidal ideation  Patient reports no homicidal ideation  Patient reports no self-injurious behavior  Patient reports no violent behavior      Assessment & Plan:     The patient's response to the interventions is accepting    The patient's progress toward treatment goals is fair     Homework assigned: none     Treatment plan:   A. Target symptoms: Depression and Anxiety   B. Therapeutic modalities: insight oriented psychotherapy, behavior modifying psychotherapy, supportive psychotherapy  C. Why chosen therapy is appropriate versus another modality: relevant to diagnosis, patient responds to this modality, evidence based practice   D. Outcome monitoring methods: self report, observation, rating scales, feedback from clinical staff      Visit Diagnosis:   1. Generalized anxiety disorder    2. Current moderate episode of major depressive disorder without prior episode    3. Attention deficit hyperactivity disorder (ADHD), predominantly inattentive type        Follow-up: individual psychotherapy    Return to Clinic: 2 weeks  Pt  Reported to Schedule Self via Epic EMR MyChart Application and/or Department Support Staff          Tori Garcia, THUYW  9/11/2023  5:00 PM

## 2023-09-11 ENCOUNTER — OFFICE VISIT (OUTPATIENT)
Dept: PSYCHIATRY | Facility: CLINIC | Age: 23
End: 2023-09-11
Payer: COMMERCIAL

## 2023-09-11 DIAGNOSIS — F41.1 GENERALIZED ANXIETY DISORDER: Primary | ICD-10-CM

## 2023-09-11 DIAGNOSIS — F32.1 CURRENT MODERATE EPISODE OF MAJOR DEPRESSIVE DISORDER WITHOUT PRIOR EPISODE: ICD-10-CM

## 2023-09-11 DIAGNOSIS — F90.0 ATTENTION DEFICIT HYPERACTIVITY DISORDER (ADHD), PREDOMINANTLY INATTENTIVE TYPE: ICD-10-CM

## 2023-09-11 PROCEDURE — 90832 PR PSYCHOTHERAPY W/PATIENT, 30 MIN: ICD-10-PCS | Mod: 95,,, | Performed by: SOCIAL WORKER

## 2023-09-11 PROCEDURE — 90832 PSYTX W PT 30 MINUTES: CPT | Mod: 95,,, | Performed by: SOCIAL WORKER

## 2023-09-28 ENCOUNTER — PATIENT OUTREACH (OUTPATIENT)
Dept: ADMINISTRATIVE | Facility: HOSPITAL | Age: 23
End: 2023-09-28
Payer: COMMERCIAL

## 2023-12-21 ENCOUNTER — CLINICAL SUPPORT (OUTPATIENT)
Dept: PRIMARY CARE CLINIC | Facility: CLINIC | Age: 23
End: 2023-12-21
Payer: COMMERCIAL

## 2023-12-21 DIAGNOSIS — Z23 NEED FOR VACCINATION: Primary | ICD-10-CM

## 2023-12-21 PROCEDURE — 90686 IIV4 VACC NO PRSV 0.5 ML IM: CPT | Mod: S$GLB,,, | Performed by: INTERNAL MEDICINE

## 2023-12-21 PROCEDURE — 90686 FLU VACCINE (QUAD) GREATER THAN OR EQUAL TO 3YO PRESERVATIVE FREE IM: ICD-10-PCS | Mod: S$GLB,,, | Performed by: INTERNAL MEDICINE

## 2023-12-21 PROCEDURE — 90471 FLU VACCINE (QUAD) GREATER THAN OR EQUAL TO 3YO PRESERVATIVE FREE IM: ICD-10-PCS | Mod: S$GLB,,, | Performed by: INTERNAL MEDICINE

## 2023-12-21 PROCEDURE — 90471 IMMUNIZATION ADMIN: CPT | Mod: S$GLB,,, | Performed by: INTERNAL MEDICINE

## 2024-01-09 ENCOUNTER — OFFICE VISIT (OUTPATIENT)
Dept: PRIMARY CARE CLINIC | Facility: CLINIC | Age: 24
End: 2024-01-09
Payer: COMMERCIAL

## 2024-01-09 VITALS
DIASTOLIC BLOOD PRESSURE: 78 MMHG | WEIGHT: 246.25 LBS | OXYGEN SATURATION: 98 % | TEMPERATURE: 97 F | HEART RATE: 76 BPM | BODY MASS INDEX: 34.35 KG/M2 | SYSTOLIC BLOOD PRESSURE: 138 MMHG

## 2024-01-09 DIAGNOSIS — F41.1 GENERALIZED ANXIETY DISORDER: ICD-10-CM

## 2024-01-09 DIAGNOSIS — Z00.00 ENCOUNTER FOR MEDICAL EXAMINATION TO ESTABLISH CARE: Primary | ICD-10-CM

## 2024-01-09 DIAGNOSIS — Z00.00 LABORATORY EXAM ORDERED AS PART OF ROUTINE GENERAL MEDICAL EXAMINATION: ICD-10-CM

## 2024-01-09 DIAGNOSIS — M77.8 RIGHT WRIST TENDINITIS: ICD-10-CM

## 2024-01-09 PROCEDURE — 1160F RVW MEDS BY RX/DR IN RCRD: CPT | Mod: CPTII,S$GLB,, | Performed by: INTERNAL MEDICINE

## 2024-01-09 PROCEDURE — 99999 PR PBB SHADOW E&M-EST. PATIENT-LVL IV: CPT | Mod: PBBFAC,,, | Performed by: INTERNAL MEDICINE

## 2024-01-09 PROCEDURE — 1159F MED LIST DOCD IN RCRD: CPT | Mod: CPTII,S$GLB,, | Performed by: INTERNAL MEDICINE

## 2024-01-09 PROCEDURE — 3008F BODY MASS INDEX DOCD: CPT | Mod: CPTII,S$GLB,, | Performed by: INTERNAL MEDICINE

## 2024-01-09 PROCEDURE — 99214 OFFICE O/P EST MOD 30 MIN: CPT | Mod: S$GLB,,, | Performed by: INTERNAL MEDICINE

## 2024-01-09 PROCEDURE — 3075F SYST BP GE 130 - 139MM HG: CPT | Mod: CPTII,S$GLB,, | Performed by: INTERNAL MEDICINE

## 2024-01-09 PROCEDURE — 3078F DIAST BP <80 MM HG: CPT | Mod: CPTII,S$GLB,, | Performed by: INTERNAL MEDICINE

## 2024-01-09 RX ORDER — MELOXICAM 15 MG/1
15 TABLET ORAL DAILY PRN
Qty: 30 TABLET | Refills: 2 | Status: SHIPPED | OUTPATIENT
Start: 2024-01-09 | End: 2024-02-05 | Stop reason: SDUPTHER

## 2024-01-09 NOTE — PROGRESS NOTES
Subjective     Patient ID: Simon Goode is a 23 y.o. male.    Chief Complaint: Establish Care (Complains of right wrist pain since Dec 20,2023)      HPI  Here as a new patient to establish care.  Also with right wrist (dominant hand) discomfort mainly wrist area and radiating into thumb at times.  A wrist splint helps. Denies trauma or redness or swelling.  Worse when trying to extend thumb against resistance.      Past Medical History:   Diagnosis Date    ADHD (attention deficit hyperactivity disorder)     Attention deficit disorder 11/8/2019    Diagnosed in middle school. Reports h/o seeing psychology or psychiatry.     Chronic constipation     Chronic diarrhea     Depression     GERD (gastroesophageal reflux disease)      Review of patient's allergies indicates:  No Known Allergies  History reviewed. No pertinent surgical history.  Family History   Problem Relation Age of Onset    Depression Mother     Hypertension Father     Depression Brother     Cancer Paternal Grandfather         prostate     Social History     Socioeconomic History    Marital status: Single   Tobacco Use    Smoking status: Never    Smokeless tobacco: Never   Substance and Sexual Activity    Alcohol use: Not Currently     Alcohol/week: 1.0 standard drink of alcohol     Types: 1 Glasses of wine per week    Drug use: Not Currently     Frequency: 1.0 times per week     Types: Marijuana    Sexual activity: Not Currently     Partners: Female, Male   Social History Narrative    Brother vapes in the household. 1 cat in the household.     Social Determinants of Health     Financial Resource Strain: Low Risk  (1/9/2024)    Overall Financial Resource Strain (CARDIA)     Difficulty of Paying Living Expenses: Not very hard   Food Insecurity: No Food Insecurity (1/9/2024)    Hunger Vital Sign     Worried About Running Out of Food in the Last Year: Never true     Ran Out of Food in the Last Year: Never true   Transportation Needs: No Transportation Needs  (1/9/2024)    PRAPARE - Transportation     Lack of Transportation (Medical): No     Lack of Transportation (Non-Medical): No   Physical Activity: Insufficiently Active (1/9/2024)    Exercise Vital Sign     Days of Exercise per Week: 2 days     Minutes of Exercise per Session: 20 min   Stress: No Stress Concern Present (1/9/2024)    [employer] [place] [employer] - Occupational Stress Questionnaire     Feeling of Stress : Only a little   Social Connections: Unknown (1/9/2024)    Social Connection and Isolation Panel [NHANES]     Frequency of Communication with Friends and Family: Twice a week     Frequency of Social Gatherings with Friends and Family: Three times a week     Active Member of Clubs or Organizations: No     Attends Club or Organization Meetings: Never     Marital Status: Never    Housing Stability: Low Risk  (1/9/2024)    Housing Stability Vital Sign     Unable to Pay for Housing in the Last Year: No     Number of Places Lived in the Last Year: 1     Unstable Housing in the Last Year: No         /78   Pulse 76   Temp 97.2 °F (36.2 °C)   Wt 111.7 kg (246 lb 4.1 oz)   SpO2 98%   BMI 34.35 kg/m²   Outpatient Medications as of 1/9/2024   Medication Sig Dispense Refill    cloNIDine (CATAPRES) 0.1 MG tablet Take 1 tablet (0.1 mg total) by mouth every evening. 90 tablet 3    dexmethylphenidate (FOCALIN XR) 20 MG 24 hr capsule Take 1 capsule (20 mg total) by mouth once daily. 30 capsule 0    dicyclomine (BENTYL) 20 mg tablet Take 1 tablet (20 mg total) by mouth 3 (three) times daily as needed (abdominal pain). 90 tablet 11    FLUoxetine 20 MG capsule Take 1 capsule (20 mg total) by mouth once daily. 90 capsule 3    FLUoxetine 40 MG capsule Take 1 capsule (40 mg total) by mouth once daily. 90 capsule 3    fluticasone propionate (FLONASE) 50 mcg/actuation nasal spray 2 sprays (100 mcg total) by Each Nostril route once daily. 9.9 mL 0    hydrOXYzine pamoate (VISTARIL) 25 MG Cap TAKE 1  CAPSULE (25 MG TOTAL) BY MOUTH 3 (THREE) TIMES DAILY AS NEEDED (ANXIETY). 270 capsule 1    ondansetron (ZOFRAN-ODT) 4 MG TbDL Take 4 mg by mouth 3 (three) times daily as needed.       No current facility-administered medications on file as of 1/9/2024.       Review of Systems   All other systems reviewed and are negative.         Objective     Physical Exam  Constitutional:       Appearance: Normal appearance.   HENT:      Head: Normocephalic and atraumatic.   Cardiovascular:      Rate and Rhythm: Normal rate.   Pulmonary:      Effort: No respiratory distress.   Musculoskeletal:         General: Tenderness present. No swelling, deformity or signs of injury.   Skin:     General: Skin is warm and dry.   Neurological:      Mental Status: He is alert and oriented to person, place, and time.   Psychiatric:         Mood and Affect: Mood normal.         Behavior: Behavior normal.            Assessment and Plan     1. Encounter for medical examination to establish care    2. Right wrist tendinitis  -     meloxicam (MOBIC) 15 MG tablet; Take 1 tablet (15 mg total) by mouth daily as needed (wrist pain).  Dispense: 30 tablet; Refill: 2    3. Laboratory exam ordered as part of routine general medical examination  -     Lipid Panel; Future; Expected date: 01/09/2024  -     Urinalysis; Future; Expected date: 01/09/2024  -     Hemoglobin A1C; Future; Expected date: 01/09/2024  -     TSH; Future; Expected date: 01/09/2024  -     Comprehensive Metabolic Panel; Future; Expected date: 01/09/2024  -     CBC Auto Differential; Future; Expected date: 01/09/2024    4. Generalized anxiety disorder       Wrist splint.  Nsaids.  De quervain's.  If not improved with above, send to ortho/sports for injection.    Follow up in about 6 months (around 7/9/2024). For annual with labs before    Immunization History   Administered Date(s) Administered    COVID-19, MRNA, LN-S, PF (Pfizer) (Purple Cap) 03/05/2021, 03/23/2021    DTaP 2000,  2000, 01/10/2001, 01/10/2002, 08/31/2004    HIB 2000, 2000, 01/10/2001, 01/10/2002    HPV 9-Valent 07/24/2015, 01/14/2016, 06/14/2016    Hepatitis A, Pediatric/Adolescent, 2 Dose 09/01/2016    Hepatitis B, Pediatric/Adolescent 2000, 2000, 01/10/2001    IPV 2000, 2000, 01/10/2002, 08/31/2004    Influenza 11/13/2008, 10/23/2009, 10/30/2012    Influenza (Flumist) - Quadrivalent - Intranasal *Preferred* (2-49 years old) 11/13/2008, 10/23/2009, 10/30/2012, 10/10/2013, 12/11/2014, 01/14/2016    Influenza - Quadrivalent - PF *Preferred* (6 months and older) 01/04/2017, 11/29/2017, 09/29/2020, 11/29/2022, 12/21/2023    Influenza A (H1N1) 2009 Monovalent - Intranasal 02/03/2010, 03/10/2010    MMR 07/12/2001, 08/31/2004    Meningococcal B, OMV 07/26/2017, 11/29/2017    Meningococcal Conjugate 11/29/2017    Meningococcal Conjugate (MCV4P) 08/04/2011, 11/29/2017, 11/29/2017    Pneumococcal Conjugate - 7 Valent 2000, 2000, 01/10/2001, 07/12/2001    Tdap 08/04/2011, 02/26/2020    Varicella 07/12/2001, 07/29/2009       I spent a total of 30 minutes on the day of the visit.This includes face to face time and non-face to face time preparing to see the patient (eg, review of tests), obtaining and/or reviewing separately obtained history, documenting clinical information in the electronic or other health record, independently interpreting results and communicating results to the patient/family/caregiver, or care coordinator.

## 2024-01-31 ENCOUNTER — OFFICE VISIT (OUTPATIENT)
Dept: PRIMARY CARE CLINIC | Facility: CLINIC | Age: 24
End: 2024-01-31
Payer: COMMERCIAL

## 2024-01-31 VITALS
DIASTOLIC BLOOD PRESSURE: 66 MMHG | SYSTOLIC BLOOD PRESSURE: 138 MMHG | WEIGHT: 247.13 LBS | BODY MASS INDEX: 34.6 KG/M2 | HEIGHT: 71 IN | TEMPERATURE: 98 F | HEART RATE: 84 BPM | OXYGEN SATURATION: 97 %

## 2024-01-31 DIAGNOSIS — F32.1 CURRENT MODERATE EPISODE OF MAJOR DEPRESSIVE DISORDER WITHOUT PRIOR EPISODE: ICD-10-CM

## 2024-01-31 DIAGNOSIS — F41.1 GENERALIZED ANXIETY DISORDER: ICD-10-CM

## 2024-01-31 DIAGNOSIS — F90.0 ATTENTION DEFICIT HYPERACTIVITY DISORDER (ADHD), PREDOMINANTLY INATTENTIVE TYPE: ICD-10-CM

## 2024-01-31 DIAGNOSIS — M77.8 RIGHT WRIST TENDINITIS: Primary | ICD-10-CM

## 2024-01-31 DIAGNOSIS — F90.1 ATTENTION DEFICIT HYPERACTIVITY DISORDER (ADHD), PREDOMINANTLY HYPERACTIVE TYPE: ICD-10-CM

## 2024-01-31 PROCEDURE — 1159F MED LIST DOCD IN RCRD: CPT | Mod: CPTII,S$GLB,, | Performed by: INTERNAL MEDICINE

## 2024-01-31 PROCEDURE — 3008F BODY MASS INDEX DOCD: CPT | Mod: CPTII,S$GLB,, | Performed by: INTERNAL MEDICINE

## 2024-01-31 PROCEDURE — 3078F DIAST BP <80 MM HG: CPT | Mod: CPTII,S$GLB,, | Performed by: INTERNAL MEDICINE

## 2024-01-31 PROCEDURE — 99999 PR PBB SHADOW E&M-EST. PATIENT-LVL IV: CPT | Mod: PBBFAC,,, | Performed by: INTERNAL MEDICINE

## 2024-01-31 PROCEDURE — 3075F SYST BP GE 130 - 139MM HG: CPT | Mod: CPTII,S$GLB,, | Performed by: INTERNAL MEDICINE

## 2024-01-31 PROCEDURE — 99214 OFFICE O/P EST MOD 30 MIN: CPT | Mod: S$GLB,,, | Performed by: INTERNAL MEDICINE

## 2024-01-31 RX ORDER — DEXMETHYLPHENIDATE HYDROCHLORIDE 20 MG/1
20 CAPSULE, EXTENDED RELEASE ORAL DAILY
Qty: 30 CAPSULE | Refills: 0 | Status: SHIPPED | OUTPATIENT
Start: 2024-01-31 | End: 2024-02-15 | Stop reason: SDUPTHER

## 2024-01-31 NOTE — PROGRESS NOTES
Subjective     Patient ID: Simon Goode is a 23 y.o. male.    Chief Complaint: Medication Refill and Wrist Pain      HPI  here for f/u.  Is in b/t psychiatry change over due to MD leaving.  Has upcoming appt.  In meantime, needs refill.  Mood stable.  Sees counselor as well.  His wrist has improved but still not back to baseline.  No longer with right thumb pain but now with pain with pronation of arm in sleep.  No trauma.  Still using brace.  No longer needs mobic.    Past Medical History:   Diagnosis Date    ADHD (attention deficit hyperactivity disorder)     Attention deficit disorder 11/8/2019    Diagnosed in middle school. Reports h/o seeing psychology or psychiatry.     Chronic constipation     Chronic diarrhea     Depression     GERD (gastroesophageal reflux disease)      Review of patient's allergies indicates:  No Known Allergies  History reviewed. No pertinent surgical history.  Family History   Problem Relation Age of Onset    Depression Mother     Hypertension Father     Depression Brother     Cancer Paternal Grandfather         prostate     Social History     Socioeconomic History    Marital status: Single   Tobacco Use    Smoking status: Never    Smokeless tobacco: Never   Substance and Sexual Activity    Alcohol use: Not Currently     Alcohol/week: 1.0 standard drink of alcohol     Types: 1 Glasses of wine per week    Drug use: Not Currently     Frequency: 1.0 times per week     Types: Marijuana    Sexual activity: Not Currently     Partners: Female, Male   Social History Narrative    Brother vapes in the household. 1 cat in the household.     Social Determinants of Health     Financial Resource Strain: Low Risk  (1/9/2024)    Overall Financial Resource Strain (CARDIA)     Difficulty of Paying Living Expenses: Not very hard   Food Insecurity: No Food Insecurity (1/9/2024)    Hunger Vital Sign     Worried About Running Out of Food in the Last Year: Never true     Ran Out of Food in the Last Year: Never  "true   Transportation Needs: No Transportation Needs (1/9/2024)    PRAPARE - Transportation     Lack of Transportation (Medical): No     Lack of Transportation (Non-Medical): No   Physical Activity: Insufficiently Active (1/9/2024)    Exercise Vital Sign     Days of Exercise per Week: 2 days     Minutes of Exercise per Session: 20 min   Stress: No Stress Concern Present (1/9/2024)    [employer] [place] [employer] - Occupational Stress Questionnaire     Feeling of Stress : Only a little   Social Connections: Unknown (1/9/2024)    Social Connection and Isolation Panel [NHANES]     Frequency of Communication with Friends and Family: Twice a week     Frequency of Social Gatherings with Friends and Family: Three times a week     Active Member of Clubs or Organizations: No     Attends Club or Organization Meetings: Never     Marital Status: Never    Housing Stability: Low Risk  (1/9/2024)    Housing Stability Vital Sign     Unable to Pay for Housing in the Last Year: No     Number of Places Lived in the Last Year: 1     Unstable Housing in the Last Year: No         /66 (BP Location: Right arm)   Pulse 84   Temp 98.4 °F (36.9 °C) (Tympanic)   Ht 5' 11" (1.803 m)   Wt 112.1 kg (247 lb 2.2 oz)   SpO2 97%   BMI 34.47 kg/m²   Outpatient Medications as of 1/31/2024   Medication Sig Dispense Refill    cloNIDine (CATAPRES) 0.1 MG tablet Take 1 tablet (0.1 mg total) by mouth every evening. 90 tablet 3    dicyclomine (BENTYL) 20 mg tablet Take 1 tablet (20 mg total) by mouth 3 (three) times daily as needed (abdominal pain). 90 tablet 11    FLUoxetine 20 MG capsule Take 1 capsule (20 mg total) by mouth once daily. 90 capsule 3    FLUoxetine 40 MG capsule Take 1 capsule (40 mg total) by mouth once daily. 90 capsule 3    fluticasone propionate (FLONASE) 50 mcg/actuation nasal spray 2 sprays (100 mcg total) by Each Nostril route once daily. 9.9 mL 0    meloxicam (MOBIC) 15 MG tablet Take 1 tablet (15 mg " total) by mouth daily as needed (wrist pain). 30 tablet 2    dexmethylphenidate (FOCALIN XR) 20 MG 24 hr capsule Take 1 capsule (20 mg total) by mouth once daily. 30 capsule 0    ondansetron (ZOFRAN-ODT) 4 MG TbDL Take 4 mg by mouth 3 (three) times daily as needed.       No current facility-administered medications on file as of 1/31/2024.       Review of Systems   All other systems reviewed and are negative.         Objective     Physical Exam  Constitutional:       Appearance: Normal appearance.   HENT:      Head: Normocephalic and atraumatic.   Eyes:      Conjunctiva/sclera: Conjunctivae normal.   Cardiovascular:      Rate and Rhythm: Normal rate.   Pulmonary:      Effort: Pulmonary effort is normal. No respiratory distress.   Musculoskeletal:      Cervical back: Neck supple.      Right lower leg: No edema.      Left lower leg: No edema.   Neurological:      Mental Status: He is alert and oriented to person, place, and time.   Psychiatric:         Mood and Affect: Mood normal.         Behavior: Behavior normal.            Assessment and Plan     1. Right wrist tendinitis  -     Ambulatory referral/consult to Sports Medicine; Future; Expected date: 02/07/2024    2. Current moderate episode of major depressive disorder without prior episode  Comments:  stable; on prozac; sees counselor; has new psychiatry appt upcoming in Feb 24  Overview:  Symptoms began in middle school. No h/o psyc hospitalization.       3. Attention deficit hyperactivity disorder (ADHD), predominantly hyperactive type  Overview:  Diagnosed in middle school. Reports h/o seeing psychology or psychiatry.       4. Generalized anxiety disorder  Comments:  stable; on prozac; sees counselor    5. Attention deficit hyperactivity disorder (ADHD), predominantly inattentive type  -     dexmethylphenidate (FOCALIN XR) 20 MG 24 hr capsule; Take 1 capsule (20 mg total) by mouth once daily.  Dispense: 30 capsule; Refill: 0       Will send for possible  injection.  Wrist has improved with med mgt, stretches/exercise/mobic and brace but still not fully w/o pain or discomfort.        Immunization History   Administered Date(s) Administered    COVID-19, MRNA, LN-S, PF (Pfizer) (Purple Cap) 03/05/2021, 03/23/2021    DTaP 2000, 2000, 01/10/2001, 01/10/2002, 08/31/2004    HIB 2000, 2000, 01/10/2001, 01/10/2002    HPV 9-Valent 07/24/2015, 01/14/2016, 06/14/2016    Hepatitis A, Pediatric/Adolescent, 2 Dose 09/01/2016    Hepatitis B, Pediatric/Adolescent 2000, 2000, 01/10/2001    IPV 2000, 2000, 01/10/2002, 08/31/2004    Influenza 11/13/2008, 10/23/2009, 10/30/2012    Influenza (Flumist) - Quadrivalent - Intranasal *Preferred* (2-49 years old) 11/13/2008, 10/23/2009, 10/30/2012, 10/10/2013, 12/11/2014, 01/14/2016    Influenza - Quadrivalent - PF *Preferred* (6 months and older) 01/04/2017, 11/29/2017, 09/29/2020, 11/29/2022, 12/21/2023    Influenza A (H1N1) 2009 Monovalent - Intranasal 02/03/2010, 03/10/2010    MMR 07/12/2001, 08/31/2004    Meningococcal B, OMV 07/26/2017, 11/29/2017    Meningococcal Conjugate 11/29/2017    Meningococcal Conjugate (MCV4P) 08/04/2011, 11/29/2017, 11/29/2017    Pneumococcal Conjugate - 7 Valent 2000, 2000, 01/10/2001, 07/12/2001    Tdap 08/04/2011, 02/26/2020    Varicella 07/12/2001, 07/29/2009       I spent a total of 30 minutes on the day of the visit.This includes face to face time and non-face to face time preparing to see the patient (eg, review of tests), obtaining and/or reviewing separately obtained history, documenting clinical information in the electronic or other health record, independently interpreting results and communicating results to the patient/family/caregiver, or care coordinator.

## 2024-02-02 DIAGNOSIS — M25.531 WRIST PAIN, RIGHT: Primary | ICD-10-CM

## 2024-02-05 ENCOUNTER — OFFICE VISIT (OUTPATIENT)
Dept: ORTHOPEDICS | Facility: CLINIC | Age: 24
End: 2024-02-05
Payer: COMMERCIAL

## 2024-02-05 ENCOUNTER — HOSPITAL ENCOUNTER (OUTPATIENT)
Dept: RADIOLOGY | Facility: HOSPITAL | Age: 24
Discharge: HOME | End: 2024-02-05
Attending: STUDENT IN AN ORGANIZED HEALTH CARE EDUCATION/TRAINING PROGRAM
Payer: COMMERCIAL

## 2024-02-05 VITALS — BODY MASS INDEX: 34.6 KG/M2 | HEIGHT: 71 IN | WEIGHT: 247.13 LBS

## 2024-02-05 DIAGNOSIS — M25.531 WRIST PAIN, RIGHT: ICD-10-CM

## 2024-02-05 DIAGNOSIS — M65.4 TENDINITIS, DE QUERVAIN'S: ICD-10-CM

## 2024-02-05 PROCEDURE — 1160F RVW MEDS BY RX/DR IN RCRD: CPT | Mod: CPTII,S$GLB,, | Performed by: STUDENT IN AN ORGANIZED HEALTH CARE EDUCATION/TRAINING PROGRAM

## 2024-02-05 PROCEDURE — 73130 X-RAY EXAM OF HAND: CPT | Mod: TC,RT

## 2024-02-05 PROCEDURE — 1159F MED LIST DOCD IN RCRD: CPT | Mod: CPTII,S$GLB,, | Performed by: STUDENT IN AN ORGANIZED HEALTH CARE EDUCATION/TRAINING PROGRAM

## 2024-02-05 PROCEDURE — 97760 ORTHOTIC MGMT&TRAING 1ST ENC: CPT | Mod: GP,S$GLB,, | Performed by: STUDENT IN AN ORGANIZED HEALTH CARE EDUCATION/TRAINING PROGRAM

## 2024-02-05 PROCEDURE — 99204 OFFICE O/P NEW MOD 45 MIN: CPT | Mod: 25,S$GLB,, | Performed by: STUDENT IN AN ORGANIZED HEALTH CARE EDUCATION/TRAINING PROGRAM

## 2024-02-05 PROCEDURE — 73130 X-RAY EXAM OF HAND: CPT | Mod: 26,RT,, | Performed by: RADIOLOGY

## 2024-02-05 PROCEDURE — 20550 NJX 1 TENDON SHEATH/LIGAMENT: CPT | Mod: RT,S$GLB,, | Performed by: STUDENT IN AN ORGANIZED HEALTH CARE EDUCATION/TRAINING PROGRAM

## 2024-02-05 PROCEDURE — 3008F BODY MASS INDEX DOCD: CPT | Mod: CPTII,S$GLB,, | Performed by: STUDENT IN AN ORGANIZED HEALTH CARE EDUCATION/TRAINING PROGRAM

## 2024-02-05 PROCEDURE — 99999 PR PBB SHADOW E&M-EST. PATIENT-LVL III: CPT | Mod: PBBFAC,,, | Performed by: STUDENT IN AN ORGANIZED HEALTH CARE EDUCATION/TRAINING PROGRAM

## 2024-02-05 RX ORDER — TRIAMCINOLONE ACETONIDE 40 MG/ML
40 INJECTION, SUSPENSION INTRA-ARTICULAR; INTRAMUSCULAR
Status: DISCONTINUED | OUTPATIENT
Start: 2024-02-05 | End: 2024-02-05 | Stop reason: HOSPADM

## 2024-02-05 RX ORDER — MELOXICAM 15 MG/1
15 TABLET ORAL DAILY PRN
Qty: 30 TABLET | Refills: 2 | Status: SHIPPED | OUTPATIENT
Start: 2024-02-05

## 2024-02-05 RX ADMIN — TRIAMCINOLONE ACETONIDE 40 MG: 40 INJECTION, SUSPENSION INTRA-ARTICULAR; INTRAMUSCULAR at 08:02

## 2024-02-05 NOTE — PROCEDURES
Right De Quervains Tenosynovitis Injection    Date/Time: 2/5/2024 8:20 AM    Performed by: Randi Carrasco MD  Authorized by: Randi Carrasco MD    Consent Done?:  Yes (Verbal)  Indications:  Pain  Timeout: prior to procedure the correct patient, procedure, and site was verified    Local anesthesia used?: Yes    Anesthesia:  Local infiltration  Local anesthetic:  Lidocaine 1% without epinephrine  Anesthetic total (ml):  1    Location:  Wrist  : Right first dorsal compartment.  Ultrasonic guidance for needle placement?: No    Needle size:  22 G  Approach:  Radial  Medications:  40 mg triamcinolone acetonide 40 mg/mL  Patient tolerance:  Patient tolerated the procedure well with no immediate complications

## 2024-02-05 NOTE — PROGRESS NOTES
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  Symptoms have taken place for approximately 6 weeks.  No history of injury.  Patient's pain level is a 0/10 at baseline but a 7/10 with certain activities.  In particular, he notices pain with rotation of the wrist, overuse, and sometimes when he was sleeping.  He has no numbness or tingling.  He was prescribed meloxicam by another provider which did help with his pain and he would like another script for this today.  No history of diabetes.    Review of Systems  Review of systems negative for chest pain, shortness of breath, fevers, chills, nausea/vomiting.    Past Medical History  Past Medical History:   Diagnosis Date    ADHD (attention deficit hyperactivity disorder)     Attention deficit disorder 11/8/2019    Diagnosed in middle school. Reports h/o seeing psychology or psychiatry.     Chronic constipation     Chronic diarrhea     Depression     GERD (gastroesophageal reflux disease)        Past Surgical History  History reviewed. No pertinent surgical history.    Allergies  Review of patient's allergies indicates:  No Known Allergies    Family History  Family History   Problem Relation Age of Onset    Depression Mother     Hypertension Father     Depression Brother     Cancer Paternal Grandfather         prostate       Social History  Social History     Socioeconomic History    Marital status: Single   Tobacco Use    Smoking status: Never     Passive exposure: Past    Smokeless tobacco: Never   Substance and Sexual Activity    Alcohol use: Not Currently     Alcohol/week: 1.0 standard drink of alcohol     Types: 1 Glasses of wine per week    Drug use: Not Currently     Frequency: 1.0 times per week     Types: Marijuana    Sexual activity: Not Currently     Partners: Female, Male    Social History Narrative    Brother vapes in the household. 1 cat in the household.     Social Determinants of Health     Financial Resource Strain: Low Risk  (1/9/2024)    Overall Financial Resource Strain (CARDIA)     Difficulty of Paying Living Expenses: Not very hard   Food Insecurity: No Food Insecurity (1/9/2024)    Hunger Vital Sign     Worried About Running Out of Food in the Last Year: Never true     Ran Out of Food in the Last Year: Never true   Transportation Needs: No Transportation Needs (1/9/2024)    PRAPARE - Transportation     Lack of Transportation (Medical): No     Lack of Transportation (Non-Medical): No   Physical Activity: Insufficiently Active (1/9/2024)    Exercise Vital Sign     Days of Exercise per Week: 2 days     Minutes of Exercise per Session: 20 min   Stress: No Stress Concern Present (1/9/2024)    [employer] [place] [employer] - Occupational Stress Questionnaire     Feeling of Stress : Only a little   Social Connections: Unknown (1/9/2024)    Social Connection and Isolation Panel [NHANES]     Frequency of Communication with Friends and Family: Twice a week     Frequency of Social Gatherings with Friends and Family: Three times a week     Active Member of Clubs or Organizations: No     Attends Club or Organization Meetings: Never     Marital Status: Never    Housing Stability: Low Risk  (1/9/2024)    Housing Stability Vital Sign     Unable to Pay for Housing in the Last Year: No     Number of Places Lived in the Last Year: 1     Unstable Housing in the Last Year: No       Vital Signs  There were no vitals filed for this visit.    Physical Exam  Constitutional: Appears well-developed and well-nourished. No distress.   HENT:   Head: Normocephalic.   Eyes: EOM are normal.   Pulmonary/Chest: Effort normal.   Neurological: Oriented to person, place, and time.   Psychiatric: Normal mood and affect.     Right Upper Extremity:  No abrasions, lacerations, wounds.  No  swelling.  No erythema.  Patient was tenderness over the 1st dorsal extensor compartment.  Positive Finkelstein's.  No tenderness over the thumb CMC joint.  No pain or crepitus with CMC grind.  Negative thumb adduction and extension tests.  No tenderness over the A1 pulleys of all 5 fingers.  No triggering with range of motion.  Patient is able to make a fist and extend all his fingers.  Sensation is intact in the median, radial, ulnar nerve distributions.  Palpable radial pulse.    Imaging  Right-hand x-rays three views were obtained today and independently reviewed by myself.  No evidence of arthritic changes are noted throughout the carpus, MCP, and IP joints.  No fracture.  No dislocation or subluxation.  No foreign body.    Assessment and Plan  23-year-old right-hand dominant male presents with right de Quervain tenosynovitis which has been symptomatic for 6 weeks.  A discussion was had with the patient regarding DeQuervain's Tenosynovitis. This condition affects several of the tendons that help with thumb motion and can cause discomfort along the radial border of the hand and wrist and with pinching, gripping or lifting. Conservative treatment usually begins with activity restriction when possible to limit heavier lifting, pushing or pulling and forearm-based, thumb spica bracing with activity or at night with sleeping. Other treatment options include the use of occasional oral or topical anti-inflammatories and steroid injections into the first dorsal compartment around the affected tendons. Ultimately, treatment may involve surgical intervention.    After our discussion, I sent a script for meloxicam to patient's pharmacy.  I discussed that he should take this with food.  I provided patient with a Velcro thumb spica brace to be worn as needed.  At least 10 minutes were spent sizing, fitting, and educating for durable medical equipment application today.  This service was performed under the direction of  Randi Carrasco MD.  CPT [id number].  I provided patient with a handout to read further about de Quervain tenosynovitis.  Lastly I suggested a steroid injection to see if this could improve or resolve his symptoms.  Patient agreed to proceed.  See procedure note.  Follow up in clinic if symptoms recur or do not resolve.      Randi Carrasco MD  Orthopaedic Hand Surgery

## 2024-02-13 ENCOUNTER — TELEPHONE (OUTPATIENT)
Dept: PSYCHIATRY | Facility: CLINIC | Age: 24
End: 2024-02-13
Payer: COMMERCIAL

## 2024-02-13 NOTE — PROGRESS NOTES
[address] [address]      PATIENT NAME:  FLACO MENARD   YOB: 1943   MRN:  [id number]   ATTENDING PHYSICIAN:  Matthew Elizondo M.D.   ROOM:  [id number]   DICTATING PHYSICIAN:  Uziel Peralta M.D.   UNIT#/ACCOUNT#:  [id number]   DATE OF ADMISSION:  2018            Patient of Dr. Elizondo.      CHIEF COMPLAINT:  Left hip arthritis.      HISTORY OF PRESENT ILLNESS:  This is a 74-year-old female with longstanding   arthritis of her lower extremity joints, who was admitted for elective total   left hip replacement by Dr. Mora.  The patient has already undergone surgery   and has already been up and walking.      PAST MEDICAL HISTORY:  Significant for hypertension; hyperlipidemia; diet-   controlled diabetes with most recent A1c being 6.0; sleep apnea for which she   uses CPAP; pulmonary embolus x2 for which she is on chronic Eliquis; and has   an abdominal aortic aneurysm that is being monitored by Cardiology; morbid   obesity; degenerative joint disease; and gout.      PAST SURGICAL HISTORY:  Includes left hip replacement, total abdominal   hysterectomy, and cholecystectomy.      ALLERGIES: HER ALLERGY IS TO STATINS, WHICH CAUSE WEAK LEGS.         SOCIAL HISTORY:  She is , retired, has 4 children.  She does not smoke   cigarettes or drink alcohol.      FAMILY HISTORY:  Mother  of autoimmune disease at 80.  Father  of   heart disease at 80.  Children are healthy.      REVIEW OF SYSTEMS:  GENERAL:  She feels fair.  EYES, EARS, NOSE, THROAT:   Significant for wearing glasses.  CARDIOVASCULAR:  Negative for chest pain or   palpitation.  RESPIRATORY:  Negative for shortness of breath, cough, or   wheeze.  GI:  Negative for nausea, vomiting, diarrhea, constipation, rectal   bleeding, hemorrhoids, or heartburn.  :  Significant for prolapsed bladder.   MUSCULOSKELETAL:  Significant  "PSYCHIATRIC EVALUATION     Disclaimer: Evaluation and treatment is based on information presented to date. Any new information may affect assessment and findings.     Name: Simon Goode  Age: 23 y.o.  : 2000    Preferred Name: Simon    Referring provider: Dr. Clotilde Zhou    Chief Complaint:  Depression, anxiety, ADHD    History of Present Illness:   Pt was transferred to provider by Dr. Zhou in psychiatry since she left [facility] last year. Pt has been prescribed Prozac since elementary school and is currently doing well on 60mg qd. He has been prescribed Focalin XR 20mg qd since 2018; he was diagnosed with ADHD in 6th grade. He stopped taking Prozac and Focalin for a period of time when in college and became very depressed, anxious, and unable to focus/concentrate. He is currently having trouble with inattention, distractibility, difficulty completing tasks, and putting off effortful tasks. He has been seeing Tori Garcia LCSW since  for therapy.    Initial Visit with Dr. Zhou 23:  Simon Goode is a 22 y.o.  single male who was referred by Shruti Sanders MD, presents for initial evaluation visit. Met with patient.      Chief Complaint: "been struggle with anxiety and depression"      History of Present Illness:   3 wishes : 1.  To get rid of poverty , everyone unlimited resources 2. Climate change 3. To be happy  Ideal job: to be a .     Hobbies: to read , to play video games, card games like casino games, magic trading card games, role playing games. Does not have a gorup of people to play with.     Depression came first. That started early middle school . No triggers.  Remember all of a sudden, things started feeling different and did not reason why; Had less energy. Things started to feel a burden.  It became worse in high school. Did see a therapist   Started also taking prozac and that helped a lot  with depression.    Felt like he has managing for a few years, but does " "not know when it went off.  Went college left home and went to [employer] [facility] and stopped taking the prozac, things went downhill and drop out of college and came home. Then started taking medication, have a normal routing getting a job. Longest period where he feels depressed around a month. Symptoms include feeling down, hopeless, anhedonia. When it gets bad, he lays in bed and take baths and showers all day long. Usually does not eat when he depressed. No history of self harm. No suicide attempts.  Currently , no si/hi. No psychosis.   Rates depression as mild.  Depression is better with getting up and doing things, going to work.   Depression is worse when he fails to do something.  I.e. work task.    Currently on Prozac 40mg daily since March 16 2023. It has helped with the depression.     Always struggle with anxiety in the background. It is never cause problems in his life before. Around when covid, the anxiety disrupted his schedule. Job has been hectic and crazy. Instability has made anxiety worse.   Deal with digestive issues, physical comforts.   Some of these periods, most last a few house in   Struggle with anxiety /depression. Had a good handle with medication and normal routine. Love his job which helps. Covid messed with his schedule  Last few year, of physical comforts then saw a gastro, but could not find anything wrong. Thought something was phsycially wrong, some success with treating it but not everything  Sweating fatigue, were still there, and stomach problems came back with medications. Thought it could be anxiety,  but dismiss it.  College classes started again, and wake up more nausea and miss work. Makes him feel guilty. Sometimes he feels so much going on,   Used to have pill organizer has not used it but recedntly started back again.  Most of the time, I feel dread. Sometime upcoming event. An upending doom and cry and sleep. Trouble sleeping as well. "Mind will not stop for hours."  He " for arthritis.  NEUROLOGIC:  Negative for   tingling or numbness.  PSYCHIATRIC:  Negative for depression or anxiety.   SKIN:  Negative for rash or itch.  IMMUNOLOGIC:  Significant for springtime   allergies.      MEDICATIONS:  On admission are as per the admission medicine reconciliation   form.      PHYSICAL EXAMINATION:  GENERAL:  She is a well-developed female in no acute   distress.  Alert and oriented x3.  VITAL SIGNS:  On admission, temperature   36.6, pulse 70, respirations 16, blood pressure 131/75, and O2 sat 100%.   HEENT:  Shows normocephalic, atraumatic.  Extraocular movements are intact.   Mucous membranes were slightly dry.  NECK:  Supple without adenopathy,   thyromegaly, or JVD.  LUNGS:  Clear to auscultation and percussion except for   bibasilar crackles.  HEART:  Rate and rhythm are regular without S3 or S4.   BACK:  Normal.  LYMPH NODE:  Normal.  ABDOMEN:  Soft and flat with normal   bowel sounds.  No organomegaly, rebound, rigidity, guarding, masses,   tenderness, or bruits.  EXTREMITIES:  Showed no clubbing or cyanosis.  She had   2+ chronic appearing edema of both forelegs and ankles.  Peripheral pulses   were intact.  NEUROLOGIC:  Showed intact cranial nerves, motor, and mental   status.  SKIN:  Showed no suspicious lesions.      LABORATORY DATA:  Show glucose 135.  Most recent uric acid was 3.4.  Chest x-   ray was clear.  She did have degenerative changes in the thoracic spine.      IMPRESSION:   1. Degenerative joint disease, now status post left total hip replacement.   2. Postoperative bradycardia.   3. Hypertension.   4. Hyperlipidemia.   5. Diet-controlled diabetes.   6. Sleep apnea, on CPAP.   7. History of pulmonary embolus x2, on chronic Eliquis.   8. Abdominal aortic aneurysm.   9. Morbid obesity.   10.History of gout.      PLAN:  Discussed with the patient.  Postoperative care as ordered by   Orthopedics.  Cardiology evaluation for bradycardia.  Home medications will be   resumed  "worries about his school assignment for college, about upcoming assighemnt, his schedule at work (it changes day to day)   He loves the work. The fact that is unknown makes it anxious  He worries about the world. About the state of the world, the future,   Never goes a day without worrying.   Worries keep him up at night, on edge, irritable, muscle tension.  Anxiety is constant.  Rates anxiety as moderate.  Anxiety is better talking to him mom.  Anxiety is worse with going to public places and interacting with people, talking on phone.      He is fine with public places if he does not interact with people. It is with meeting new people. Used to get panic attacks when in high school. Mostly revolving around school work. A couple of weeks ago, he missed a deadline and he got worked up and had a   Anxiety is worse than depression right now while on the prozac.  At [facility], he did not need and stop the prozac and adhd,   Then lost the motivation to take it.      Diagnosed with ADHD in 6th grade.medicated in 6th grade and through college when he stop taking the prozac and adhd  He was prescribe the Focalin due to insurance and remember he took his medication. Do a PA.   Trouble paying close attention to details, or careless mistakes: admits to  difficulty sustaining attention/remaining focused: admits to  Absent minded/wandeing thoughts during conversation: admits to  Doesn't follow through on instructions, starts tasks but does not finish or easily distracted: admits to  Difficulty with organizing: admits to  Avoids/dislikes tasks that require sustained attention: admits to  Looses important things: admits to  Easily distracted by extraneous stimuli: admits to  Forgetful in daily activities: admits to  ------  Often fidgets/squirms: admits to  Often leaves seat at inappropriate times: denies  Runs around, climbing on things or feeling restless: denies  Unable to engage in leisure activities: denies  Often "on the " when appropriate.  Physical and occupational therapy have been   ordered.  The patient will continue her CPAP.  Incentive breathing exercises   have been ordered.  The patient understands and agrees with the plan.         Uziel Peralta M.D.      Author ID:  [id number]   MedMYNOR / FRANKIE: [id number] / Job#: [id number]   D: 01/29/2018 18:01:51   T: 01/30/2018 03:15:21         "go" , motor driven: denies  Often talks excessively: admits to  Blurts out, interrupts: admits to  Can't wait turn: denies  Often interrupts/intrudes: denies     Tics - Facial eye and nose will twitch. It went away when it went to high school. No longer has it.      Mood - happy and worried  confirms marijuana use in the past. Using for every other day for 1 month. Daily user for since he was 16 years old. Quit 3 weeks ago. It might be contributing to depression/anxiety. Could be contributing to stomach issue. Mom has been concerned about it. Notes he was using marijuana to help with anxiety.    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 Never . Live with parents and brother  Works at elementary school as ; working on degree in education. [employer] online for now but trying to transfer to  campus; wants to teach math.  HS grad 2018. Reads a lot; video games     ADHD: inattentive, not listening, no follow-through, avoids effortful tasks, easily distracted   Depressive Disorder: tired/fatigued, concentration problems   Anxiety Disorder: fatigue, concentration problems   Panic Disorder: denied   Manic " "Disorder: denied   Psychotic Disorder: denied   Substance Use:  denied         9/11/2023     4:34 PM 9/5/2023     8:53 AM 6/8/2023    10:27 AM   GAD7   1. Feeling nervous, anxious, or on edge? 0 0 0   2. Not being able to stop or control worrying? 0 0 0   3. Worrying too much about different things? 0 0 0   4. Trouble relaxing? 0 0 0   5. Being so restless that it is hard to sit still? 0 0 0   6. Becoming easily annoyed or irritable? 0 0 0   7. Feeling afraid as if something awful might happen? 0 0 0   8. If you checked off any problems, how difficult have these problems made it for you to do your work, take care of things at home, or get along with other people?   0   WENDIE-7 Score 0 0 0     Review of Systems   Constitutional:  Positive for fatigue. Negative for activity change, appetite change and unexpected weight change.   Respiratory:  Negative for shortness of breath.    Psychiatric/Behavioral:  Positive for decreased concentration. Negative for agitation, behavioral problems, confusion, dysphoric mood, hallucinations, self-injury, sleep disturbance and suicidal ideas. The patient is not nervous/anxious and is not hyperactive.       Nutritional Screening: Considering the patient's height and weight, medications, medical history and preferences, should a referral be made to the dietitian? no    Constitutional:  Vitals:  Most recent vital signs were reviewed.   Last 3 sets of Vitals        1/31/2024    11:41 AM 2/5/2024     8:11 AM 2/15/2024     9:03 AM   Vitals - 1 value per visit   SYSTOLIC 144  128   DIASTOLIC 68  84   Pulse 84  85   Temp 98.4 °F (36.9 °C)     SPO2 97 %     Weight (lb) 247.14 247.14    Weight (kg) 112.1 112.1    Height 5' 11" (1.803 m) 5' 11" (1.803 m)    BMI (Calculated) 34.5 34.5    Pain Score  Seven           Psychiatric:  Oriented: x 3 / including: Date: 2/15/24; and aware meeting with [facility] [place]   Attitude: cooperative   Eye Contact: good   Behavior: wnl   Mood: " ""good"  Affect: appropriate range   Attention: intact   Concentration: grossly intact   Thought Process: goal directed   Speech: intelligible  Volume: WNL   Quantity: WNL   Rhythm: WNL  Insight: fair to good   Threats: no SI / HI   Memory: Grossly intact  Psychosis: denies all   Estimate of Intellectual Function: average   Judgment (to simple situation): fair to good   Relevant Elements of Neurological Exam: normal gait     Medical history:   Past Medical History:   Diagnosis Date    ADHD (attention deficit hyperactivity disorder)     Attention deficit disorder 11/8/2019    Diagnosed in middle school. Reports h/o seeing psychology or psychiatry.     Chronic constipation     Chronic diarrhea     Depression     GERD (gastroesophageal reflux disease)       Family History:  Family History   Problem Relation Age of Onset    Depression Mother     Hypertension Father     Depression Brother     Cancer Paternal Grandfather         prostate      Family history of psychiatric illness: Brother and mother: Depression and anxiety; paternal grandmother: Anxiety.    PSYCHO-SOCIAL DEVELOPMENT HISTORY:   Social History     Socioeconomic History    Marital status: Single   Tobacco Use    Smoking status: Never     Passive exposure: Past    Smokeless tobacco: Never   Substance and Sexual Activity    Alcohol use: Not Currently     Alcohol/week: 1.0 standard drink of alcohol     Types: 1 Glasses of wine per week    Drug use: Not Currently     Frequency: 1.0 times per week     Types: Marijuana    Sexual activity: Not Currently     Partners: Female, Male   Social History Narrative    Brother vapes in the household. 1 cat in the household.     Social Determinants of Health     Financial Resource Strain: Low Risk  (1/9/2024)    Overall Financial Resource Strain (CARDIA)     Difficulty of Paying Living Expenses: Not very hard   Food Insecurity: No Food Insecurity (1/9/2024)    Hunger Vital Sign     Worried About Running Out of Food in the Last " Year: Never true     Ran Out of Food in the Last Year: Never true   Transportation Needs: No Transportation Needs (1/9/2024)    PRAPARE - Transportation     Lack of Transportation (Medical): No     Lack of Transportation (Non-Medical): No   Physical Activity: Insufficiently Active (1/9/2024)    Exercise Vital Sign     Days of Exercise per Week: 2 days     Minutes of Exercise per Session: 20 min   Stress: No Stress Concern Present (1/9/2024)    [employer] [place] [employer] - Occupational Stress Questionnaire     Feeling of Stress : Only a little   Social Connections: Unknown (1/9/2024)    Social Connection and Isolation Panel [NHANES]     Frequency of Communication with Friends and Family: Twice a week     Frequency of Social Gatherings with Friends and Family: Three times a week     Active Member of Clubs or Organizations: No     Attends Club or Organization Meetings: Never     Marital Status: Never    Housing Stability: Low Risk  (1/9/2024)    Housing Stability Vital Sign     Unable to Pay for Housing in the Last Year: No     Number of Places Lived in the Last Year: 1     Unstable Housing in the Last Year: No      Allergy Review:   Review of patient's allergies indicates:  No Known Allergies     Medical Problem List:   Patient Active Problem List   Diagnosis    Current moderate episode of major depressive disorder without prior episode    Attention deficit hyperactivity disorder (ADHD), predominantly hyperactive type    Generalized anxiety disorder      Encounter Diagnoses   Name Primary?    Current moderate episode of major depressive disorder without prior episode     Attention deficit hyperactivity disorder (ADHD), predominantly inattentive type Yes    Generalized anxiety disorder     Insomnia, unspecified type       IMPRESSIONS/PLAN:  Pt meets diagnostic criteria for Major Depressive Disorder, recurrent, moderate, Generalized Anxiety Disorder, Insomnia, and Attention Deficit Hyperactivity  Disorder, predominantly inattentive type.    Medication Management: Continue current medications. Discussed risks, benefits, and alternatives to treatment plan documented above with patient. I answered all patient questions related to this plan, and patient expressed understanding and agreement.      Follow up in about 4 weeks (around 3/14/2024) for Medication follow up.     Medication List with Changes/Refills   Current Medications    DICYCLOMINE (BENTYL) 20 MG TABLET    Take 1 tablet (20 mg total) by mouth 3 (three) times daily as needed (abdominal pain).    FLUTICASONE PROPIONATE (FLONASE) 50 MCG/ACTUATION NASAL SPRAY    2 sprays (100 mcg total) by Each Nostril route once daily.    MELOXICAM (MOBIC) 15 MG TABLET    Take 1 tablet (15 mg total) by mouth daily as needed (wrist pain). Take with food.    ONDANSETRON (ZOFRAN-ODT) 4 MG TBDL    Take 4 mg by mouth 3 (three) times daily as needed.   Changed and/or Refilled Medications    Modified Medication Previous Medication    CLONIDINE (CATAPRES) 0.1 MG TABLET cloNIDine (CATAPRES) 0.1 MG tablet       Take 1 tablet (0.1 mg total) by mouth every evening.    Take 1 tablet (0.1 mg total) by mouth every evening.    DEXMETHYLPHENIDATE (FOCALIN XR) 25 MG 24 HR CAPSULE dexmethylphenidate (FOCALIN XR) 20 MG 24 hr capsule       Take 1 capsule (25 mg) by mouth once daily.    Take 1 capsule (20 mg total) by mouth once daily.    FLUOXETINE 20 MG CAPSULE FLUoxetine 20 MG capsule       Take 1 capsule (20 mg total) by mouth once daily.    Take 1 capsule (20 mg total) by mouth once daily.    FLUOXETINE 40 MG CAPSULE FLUoxetine 40 MG capsule       Take 1 capsule (40 mg total) by mouth once daily.    Take 1 capsule (40 mg total) by mouth once daily.   Discontinued Medications    HYDROXYZINE PAMOATE (VISTARIL) 25 MG CAP    TAKE 1 CAPSULE (25 MG TOTAL) BY MOUTH 3 (THREE) TIMES DAILY AS NEEDED (ANXIETY).      Time spent with pt including note preparation: 60 minutes     Alethea Torres,  PhD, MP  Medical Psychologist

## 2024-02-15 ENCOUNTER — OFFICE VISIT (OUTPATIENT)
Dept: PSYCHIATRY | Facility: CLINIC | Age: 24
End: 2024-02-15
Payer: COMMERCIAL

## 2024-02-15 VITALS — DIASTOLIC BLOOD PRESSURE: 84 MMHG | SYSTOLIC BLOOD PRESSURE: 128 MMHG | HEART RATE: 85 BPM

## 2024-02-15 DIAGNOSIS — G47.00 INSOMNIA, UNSPECIFIED TYPE: ICD-10-CM

## 2024-02-15 DIAGNOSIS — F41.1 GENERALIZED ANXIETY DISORDER: ICD-10-CM

## 2024-02-15 DIAGNOSIS — F32.1 CURRENT MODERATE EPISODE OF MAJOR DEPRESSIVE DISORDER WITHOUT PRIOR EPISODE: ICD-10-CM

## 2024-02-15 DIAGNOSIS — F90.0 ATTENTION DEFICIT HYPERACTIVITY DISORDER (ADHD), PREDOMINANTLY INATTENTIVE TYPE: Primary | ICD-10-CM

## 2024-02-15 PROCEDURE — 99999 PR PBB SHADOW E&M-EST. PATIENT-LVL II: CPT | Mod: PBBFAC,,, | Performed by: PSYCHOLOGIST

## 2024-02-15 PROCEDURE — 3079F DIAST BP 80-89 MM HG: CPT | Mod: CPTII,S$GLB,, | Performed by: PSYCHOLOGIST

## 2024-02-15 PROCEDURE — 3074F SYST BP LT 130 MM HG: CPT | Mod: CPTII,S$GLB,, | Performed by: PSYCHOLOGIST

## 2024-02-15 PROCEDURE — 1159F MED LIST DOCD IN RCRD: CPT | Mod: CPTII,S$GLB,, | Performed by: PSYCHOLOGIST

## 2024-02-15 PROCEDURE — 99215 OFFICE O/P EST HI 40 MIN: CPT | Mod: S$GLB,,, | Performed by: PSYCHOLOGIST

## 2024-02-15 RX ORDER — CLONIDINE HYDROCHLORIDE 0.1 MG/1
0.1 TABLET ORAL NIGHTLY
Qty: 90 TABLET | Refills: 3 | Status: SHIPPED | OUTPATIENT
Start: 2024-02-15 | End: 2024-05-06 | Stop reason: SDUPTHER

## 2024-02-15 RX ORDER — DEXMETHYLPHENIDATE HYDROCHLORIDE 25 MG/1
25 CAPSULE, EXTENDED RELEASE ORAL DAILY
Qty: 30 CAPSULE | Refills: 0 | Status: SHIPPED | OUTPATIENT
Start: 2024-02-15 | End: 2024-02-28 | Stop reason: DRUGHIGH

## 2024-02-15 RX ORDER — FLUOXETINE HYDROCHLORIDE 40 MG/1
40 CAPSULE ORAL DAILY
Qty: 90 CAPSULE | Refills: 3 | Status: SHIPPED | OUTPATIENT
Start: 2024-02-15 | End: 2024-03-18 | Stop reason: SDUPTHER

## 2024-02-15 RX ORDER — FLUOXETINE HYDROCHLORIDE 20 MG/1
20 CAPSULE ORAL DAILY
Qty: 90 CAPSULE | Refills: 3 | Status: SHIPPED | OUTPATIENT
Start: 2024-02-15 | End: 2024-03-18 | Stop reason: SDUPTHER

## 2024-02-28 ENCOUNTER — PATIENT MESSAGE (OUTPATIENT)
Dept: PSYCHIATRY | Facility: CLINIC | Age: 24
End: 2024-02-28
Payer: COMMERCIAL

## 2024-02-28 DIAGNOSIS — F90.0 ATTENTION DEFICIT HYPERACTIVITY DISORDER (ADHD), PREDOMINANTLY INATTENTIVE TYPE: ICD-10-CM

## 2024-02-28 RX ORDER — DEXMETHYLPHENIDATE HYDROCHLORIDE 20 MG/1
20 CAPSULE, EXTENDED RELEASE ORAL DAILY
Qty: 30 CAPSULE | Refills: 0 | Status: SHIPPED | OUTPATIENT
Start: 2024-02-28 | End: 2024-03-04 | Stop reason: SDUPTHER

## 2024-03-04 ENCOUNTER — PATIENT MESSAGE (OUTPATIENT)
Dept: PSYCHIATRY | Facility: CLINIC | Age: 24
End: 2024-03-04
Payer: COMMERCIAL

## 2024-03-04 DIAGNOSIS — F90.0 ATTENTION DEFICIT HYPERACTIVITY DISORDER (ADHD), PREDOMINANTLY INATTENTIVE TYPE: ICD-10-CM

## 2024-03-04 RX ORDER — DEXMETHYLPHENIDATE HYDROCHLORIDE 20 MG/1
20 CAPSULE, EXTENDED RELEASE ORAL DAILY
Qty: 30 CAPSULE | Refills: 0 | Status: SHIPPED | OUTPATIENT
Start: 2024-03-04 | End: 2024-05-06 | Stop reason: ALTCHOICE

## 2024-03-14 ENCOUNTER — PATIENT MESSAGE (OUTPATIENT)
Dept: PSYCHIATRY | Facility: CLINIC | Age: 24
End: 2024-03-14
Payer: COMMERCIAL

## 2024-03-14 ENCOUNTER — TELEPHONE (OUTPATIENT)
Dept: PSYCHIATRY | Facility: CLINIC | Age: 24
End: 2024-03-14
Payer: COMMERCIAL

## 2024-03-18 ENCOUNTER — PATIENT MESSAGE (OUTPATIENT)
Dept: PSYCHIATRY | Facility: CLINIC | Age: 24
End: 2024-03-18

## 2024-03-18 ENCOUNTER — OFFICE VISIT (OUTPATIENT)
Dept: PSYCHIATRY | Facility: CLINIC | Age: 24
End: 2024-03-18
Payer: COMMERCIAL

## 2024-03-18 DIAGNOSIS — F41.1 GENERALIZED ANXIETY DISORDER: ICD-10-CM

## 2024-03-18 DIAGNOSIS — F90.0 ATTENTION DEFICIT HYPERACTIVITY DISORDER (ADHD), PREDOMINANTLY INATTENTIVE TYPE: ICD-10-CM

## 2024-03-18 DIAGNOSIS — F32.1 CURRENT MODERATE EPISODE OF MAJOR DEPRESSIVE DISORDER WITHOUT PRIOR EPISODE: ICD-10-CM

## 2024-03-18 DIAGNOSIS — F41.1 GAD (GENERALIZED ANXIETY DISORDER): Primary | ICD-10-CM

## 2024-03-18 DIAGNOSIS — G47.00 INSOMNIA, UNSPECIFIED TYPE: ICD-10-CM

## 2024-03-18 PROCEDURE — 99214 OFFICE O/P EST MOD 30 MIN: CPT | Mod: 95,,, | Performed by: PSYCHOLOGIST

## 2024-03-18 PROCEDURE — 1159F MED LIST DOCD IN RCRD: CPT | Mod: CPTII,95,, | Performed by: PSYCHOLOGIST

## 2024-03-18 RX ORDER — FLUOXETINE HYDROCHLORIDE 40 MG/1
40 CAPSULE ORAL DAILY
Qty: 90 CAPSULE | Refills: 3 | Status: SHIPPED | OUTPATIENT
Start: 2024-03-18 | End: 2024-05-06 | Stop reason: SDUPTHER

## 2024-03-18 RX ORDER — FLUOXETINE HYDROCHLORIDE 20 MG/1
20 CAPSULE ORAL DAILY
Qty: 90 CAPSULE | Refills: 3 | Status: SHIPPED | OUTPATIENT
Start: 2024-03-18 | End: 2024-05-06 | Stop reason: SDUPTHER

## 2024-03-18 RX ORDER — DEXTROAMPHETAMINE SACCHARATE, AMPHETAMINE ASPARTATE MONOHYDRATE, DEXTROAMPHETAMINE SULFATE AND AMPHETAMINE SULFATE 5; 5; 5; 5 MG/1; MG/1; MG/1; MG/1
20 CAPSULE, EXTENDED RELEASE ORAL EVERY MORNING
Qty: 30 CAPSULE | Refills: 0 | Status: SHIPPED | OUTPATIENT
Start: 2024-03-18 | End: 2024-05-06 | Stop reason: SDUPTHER

## 2024-03-18 NOTE — PROGRESS NOTES
MEDICATION MANAGEMENT SESSION: VIRTUAL    Name: Simon Goode  Age: 23 y.o.  : 2000    Preferred Name: Simon    Referring provider: Dr. Clotilde Zhou    Reason for Visit:  Medication follow up    Summary of Visit:  Pt reports being without Focalin XR 20 mg for the past 2 weeks since he stopped taking it due to side effects (increased HR, decreased appetite) and has been unable to find lower dose available. It has been difficult at work with his ADHD symptoms. He is forgetful, takes longer to complete thing tasks, constantly distracted. Discussed alternate medications with pt (e.g., Adderall) and reviewed associated benefits and risks. He continues to do well on Prozac 60 mg qd.    Current Symptoms:   ADHD: inattentive, no follow-through, forgetful, easily distracted   Depressive Disorder: tired/fatigued, concentration problems   Anxiety Disorder: anxiety/nervousness, concentration problems, excessive worry   Panic Disorder: denied   Manic Disorder: denied   Psychotic Disorder: denied   Substance Use:  denied         2023     4:34 PM 2023     8:53 AM 2023    10:27 AM   GAD7   1. Feeling nervous, anxious, or on edge? 0 0 0   2. Not being able to stop or control worrying? 0 0 0   3. Worrying too much about different things? 0 0 0   4. Trouble relaxing? 0 0 0   5. Being so restless that it is hard to sit still? 0 0 0   6. Becoming easily annoyed or irritable? 0 0 0   7. Feeling afraid as if something awful might happen? 0 0 0   8. If you checked off any problems, how difficult have these problems made it for you to do your work, take care of things at home, or get along with other people?   0   WENDIE-7 Score 0 0 0     Review of Systems   Constitutional:  Positive for fatigue. Negative for activity change, appetite change and unexpected weight change.   Respiratory:  Negative for shortness of breath.    Psychiatric/Behavioral:  Positive for decreased concentration. Negative for agitation, behavioral  "problems, confusion, dysphoric mood, hallucinations, self-injury, sleep disturbance and suicidal ideas. The patient is nervous/anxious. The patient is not hyperactive.       Constitutional:  Vitals:  Most recent vital signs were reviewed.   Last 3 sets of Vitals        1/31/2024    11:41 AM 2/5/2024     8:11 AM 2/15/2024     9:03 AM   Vitals - 1 value per visit   SYSTOLIC 144  128   DIASTOLIC 68  84   Pulse 84  85   Temp 98.4 °F (36.9 °C)     SPO2 97 %     Weight (lb) 247.14 247.14    Weight (kg) 112.1 112.1    Height 5' 11" (1.803 m) 5' 11" (1.803 m)    BMI (Calculated) 34.5 34.5    Pain Score  Seven           Psychiatric:  Oriented: x 3   Attitude: cooperative   Eye Contact: good   Behavior: wnl   Mood: "pretty good"  Affect: appropriate range   Attention: intact   Concentration: grossly intact   Thought Process: goal directed   Speech: intelligible  Volume: WNL   Quantity: WNL   Rhythm: WNL  Insight: fair to good   Threats: no SI / HI   Memory: Grossly intact  Psychosis: denies all   Estimate of Intellectual Function: average   Judgment: fair to good   Relevant Elements of Neurological Exam: normal gait     Allergy Review:   Review of patient's allergies indicates:  No Known Allergies     Medical Problem List:   Patient Active Problem List   Diagnosis    Current moderate episode of major depressive disorder without prior episode    Attention deficit hyperactivity disorder (ADHD), predominantly hyperactive type    Generalized anxiety disorder      Encounter Diagnoses   Name Primary?    Attention deficit hyperactivity disorder (ADHD), predominantly inattentive type     WENDIE (generalized anxiety disorder) Yes    Insomnia, unspecified type     Current moderate episode of major depressive disorder without prior episode     Generalized anxiety disorder      PLAN:  Medication Management: Continue current medications. Discussed risks, benefits, and alternatives to treatment plan documented above with patient. I answered " all patient questions related to this plan, and patient expressed understanding and agreement.      Follow up in about 3 months (around 6/18/2024) for Medication follow up.     Medication List with Changes/Refills   New Medications    DEXTROAMPHETAMINE-AMPHETAMINE (ADDERALL XR) 20 MG 24 HR CAPSULE    Take 1 capsule (20 mg total) by mouth every morning.   Current Medications    CLONIDINE (CATAPRES) 0.1 MG TABLET    Take 1 tablet (0.1 mg total) by mouth every evening.    DEXMETHYLPHENIDATE (FOCALIN XR) 20 MG 24 HR CAPSULE    Take 1 capsule (20 mg total) by mouth once daily.    DICYCLOMINE (BENTYL) 20 MG TABLET    Take 1 tablet (20 mg total) by mouth 3 (three) times daily as needed (abdominal pain).    FLUTICASONE PROPIONATE (FLONASE) 50 MCG/ACTUATION NASAL SPRAY    2 sprays (100 mcg total) by Each Nostril route once daily.    MELOXICAM (MOBIC) 15 MG TABLET    Take 1 tablet (15 mg total) by mouth daily as needed (wrist pain). Take with food.    ONDANSETRON (ZOFRAN-ODT) 4 MG TBDL    Take 4 mg by mouth 3 (three) times daily as needed.   Changed and/or Refilled Medications    Modified Medication Previous Medication    FLUOXETINE 20 MG CAPSULE FLUoxetine 20 MG capsule       Take 1 capsule (20 mg total) by mouth once daily.    Take 1 capsule (20 mg total) by mouth once daily.    FLUOXETINE 40 MG CAPSULE FLUoxetine 40 MG capsule       Take 1 capsule (40 mg total) by mouth once daily.    Take 1 capsule (40 mg total) by mouth once daily.      Time spent with pt including note preparation: 30 minutes     Alethea Torres, PhD, MP  Medical Psychologist

## 2024-03-19 ENCOUNTER — PATIENT MESSAGE (OUTPATIENT)
Dept: PSYCHIATRY | Facility: CLINIC | Age: 24
End: 2024-03-19
Payer: COMMERCIAL

## 2024-03-27 ENCOUNTER — ON-DEMAND VIRTUAL (OUTPATIENT)
Dept: URGENT CARE | Facility: CLINIC | Age: 24
End: 2024-03-27
Payer: COMMERCIAL

## 2024-03-27 DIAGNOSIS — R21 MACULAR RASH: Primary | ICD-10-CM

## 2024-03-27 PROCEDURE — 99213 OFFICE O/P EST LOW 20 MIN: CPT | Mod: 95,,, | Performed by: PHYSICIAN ASSISTANT

## 2024-03-28 NOTE — PROGRESS NOTES
Subjective:      Patient ID: Simon Gooed is a 23 y.o. male.    Vitals:  vitals were not taken for this visit.     Chief Complaint: Rash      Visit Type: TELE AUDIOVISUAL    Present with the patient at the time of consultation: TELEMED PRESENT WITH PATIENT: None at home in [place]    Past Medical History:   Diagnosis Date    ADHD (attention deficit hyperactivity disorder)     Attention deficit disorder 11/8/2019    Diagnosed in middle school. Reports h/o seeing psychology or psychiatry.     Chronic constipation     Chronic diarrhea     Depression     GERD (gastroesophageal reflux disease)      History reviewed. No pertinent surgical history.  Review of patient's allergies indicates:  No Known Allergies  Current Outpatient Medications on File Prior to Visit   Medication Sig Dispense Refill    cloNIDine (CATAPRES) 0.1 MG tablet Take 1 tablet (0.1 mg total) by mouth every evening. 90 tablet 3    dexmethylphenidate (FOCALIN XR) 20 MG 24 hr capsule Take 1 capsule (20 mg total) by mouth once daily. 30 capsule 0    dextroamphetamine-amphetamine (ADDERALL XR) 20 MG 24 hr capsule Take 1 capsule (20 mg total) by mouth every morning. 30 capsule 0    dicyclomine (BENTYL) 20 mg tablet Take 1 tablet (20 mg total) by mouth 3 (three) times daily as needed (abdominal pain). 90 tablet 11    FLUoxetine 20 MG capsule Take 1 capsule (20 mg total) by mouth once daily. 90 capsule 3    FLUoxetine 40 MG capsule Take 1 capsule (40 mg total) by mouth once daily. 90 capsule 3    fluticasone propionate (FLONASE) 50 mcg/actuation nasal spray 2 sprays (100 mcg total) by Each Nostril route once daily. 9.9 mL 0    meloxicam (MOBIC) 15 MG tablet Take 1 tablet (15 mg total) by mouth daily as needed (wrist pain). Take with food. 30 tablet 2    ondansetron (ZOFRAN-ODT) 4 MG TbDL Take 4 mg by mouth 3 (three) times daily as needed.       No current facility-administered medications on file prior to visit.     Family History   Problem Relation Age of Onset     Depression Mother     Hypertension Father     Depression Brother     Cancer Paternal Grandfather         prostate           Ohs Peq Odvv Intake    3/27/2024  7:45 PM CDT - Filed by Patient   What is your current physical address in the event of a medical emergency? [address]   Are you able to take your vital signs? No   Please attach any relevant images or files          HPI  22yo male presents with concern for medication reaction. Started new ADD medication yesterday - Adderall, has two doses.  Noticed some red spots just on legs and arms x one hour ago. Not spreading. No pain or itching, doesn't feel raised or rough.     Hx of similar rash few weeks ago but today is much more extensive.    Notes he's recently getting over URI. Denies any fevers, body aches, joint pain, malaise.     No known sick contacts or rash exposure. No other changes in medications or topical products.         Constitution: Negative for activity change, appetite change, fatigue and fever.   HENT: Negative.     Cardiovascular: Negative.    Respiratory: Negative.     Gastrointestinal:  Negative for nausea, vomiting and bright red blood in stool.   Genitourinary:  Negative for hematuria.   Musculoskeletal:  Negative for joint pain, joint swelling and muscle ache.   Skin:  Positive for rash.   Neurological: Negative.         Objective:   The physical exam was conducted virtually.  Physical Exam   Constitutional: He is oriented to person, place, and time.  Non-toxic appearance. He does not appear ill. No distress.   HENT:   Head: Normocephalic and atraumatic.   Neck: Neck supple.   Pulmonary/Chest: Effort normal. No respiratory distress.   Abdominal: Normal appearance.   Neurological: He is alert and oriented to person, place, and time. Coordination normal.   Skin: Skin is dry, not diaphoretic, not pale and rash (few scattered erythematous macular rash on legs and arms. yodit with pressure. no pustules, blisters.).   Psychiatric: His  behavior is normal. Judgment and thought content normal.       Assessment:     1. Macular rash        Plan:       Macular rash    1. Recommend to follow up with primary care provider in next 1-2 days for evaluation, sooner if worsening or new symptoms. Also as discussed, please contact your psychiatrist tomorrow morning to review rash and correlation with Adderall.    2. If develop any fevers, trouble breathing, vomiting, headaches go directly to emergency room.   3. You must understand that you've received a Telehealth Urgent Care treatment only and that you may be released before all your medical problems are known or treated. You, the patient, will arrange for follow up care as instructed.??  Patient voiced understanding and agrees to plan.

## 2024-03-28 NOTE — PATIENT INSTRUCTIONS
1. Recommend to follow up with primary care provider in next 1-2 days for evaluation, sooner if worsening or new symptoms. Also as discussed, please contact your psychiatrist tomorrow morning to review rash and correlation with Adderall.    2. If develop any fevers, trouble breathing, vomiting, headaches go directly to emergency room.   3. You must understand that you've received a Telehealth Urgent Care treatment only and that you may be released before all your medical problems are known or treated. You, the patient, will arrange for follow up care as instructed.   Solaraze Counseling:  I discussed with the patient the risks of Solaraze including but not limited to erythema, scaling, itching, weeping, crusting, and pain.

## 2024-05-03 NOTE — PROGRESS NOTES
MEDICATION MANAGEMENT SESSION: VIRTUAL    Name: Simon Goode  Age: 23 y.o.  : 2000    Preferred Name: Simon    Referring provider: Dr. Clotilde Zhou    Reason for Visit:  Medication follow up    Summary of Visit:  Pt reports he has done well with Adderall XR 20 mg qd since last visit. He finds it works better than Focalin did. He has not experienced side effects and is sleeping better (no more middle insomnia). His mood has been good, no problems with depression. Energy and appetite are fine. He reports some ongoing anxiety, but worry has not been as much of a problem. He continues to do well with Prozac 60 mg qd, Clonidine 0.1 mg qhs. Pt enrolled in school this month to work on his college degree at [employer]; courses are all online.    Current Symptoms:   ADHD: Symptoms managed with Adderall XR 20 mg   Depressive Disorder: denied   Anxiety Disorder: anxiety/nervousness   Panic Disorder: denied   Manic Disorder: denied   Psychotic Disorder: denied   Substance Use:  denied         2023     4:34 PM 2023     8:53 AM 2023    10:27 AM   GAD7   1. Feeling nervous, anxious, or on edge? 0 0 0   2. Not being able to stop or control worrying? 0 0 0   3. Worrying too much about different things? 0 0 0   4. Trouble relaxing? 0 0 0   5. Being so restless that it is hard to sit still? 0 0 0   6. Becoming easily annoyed or irritable? 0 0 0   7. Feeling afraid as if something awful might happen? 0 0 0   8. If you checked off any problems, how difficult have these problems made it for you to do your work, take care of things at home, or get along with other people?   0   WENDIE-7 Score 0 0 0     Review of Systems   Constitutional:  Negative for activity change, appetite change, fatigue and unexpected weight change.   Respiratory:  Negative for shortness of breath.    Psychiatric/Behavioral:  Negative for agitation, behavioral problems, confusion, decreased concentration, dysphoric mood, hallucinations,  "self-injury, sleep disturbance and suicidal ideas. The patient is nervous/anxious. The patient is not hyperactive.       Constitutional:  Vitals:  Most recent vital signs were reviewed.   Last 3 sets of Vitals        1/31/2024    11:41 AM 2/5/2024     8:11 AM 2/15/2024     9:03 AM   Vitals - 1 value per visit   SYSTOLIC 144  128   DIASTOLIC 68  84   Pulse 84  85   Temp 98.4 °F (36.9 °C)     SPO2 97 %     Weight (lb) 247.14 247.14    Weight (kg) 112.1 112.1    Height 5' 11" (1.803 m) 5' 11" (1.803 m)    BMI (Calculated) 34.5 34.5    Pain Score  Seven           Psychiatric:  Oriented: x 3   Attitude: cooperative   Eye Contact: good   Behavior: wnl   Mood: "good"  Affect: appropriate range   Attention: intact   Concentration: grossly intact   Thought Process: goal directed   Speech: intelligible  Volume: WNL   Quantity: WNL   Rhythm: WNL  Insight: fair to good   Threats: no SI / HI   Memory: Grossly intact  Psychosis: denies all   Estimate of Intellectual Function: average   Judgment: fair to good   Relevant Elements of Neurological Exam: normal gait     Allergy Review:   Review of patient's allergies indicates:  No Known Allergies     Medical Problem List:   Patient Active Problem List   Diagnosis    Current moderate episode of major depressive disorder without prior episode    Attention deficit hyperactivity disorder (ADHD), predominantly hyperactive type    Generalized anxiety disorder      Encounter Diagnoses   Name Primary?    Attention deficit hyperactivity disorder (ADHD), predominantly inattentive type Yes    Current moderate episode of major depressive disorder without prior episode     Insomnia, unspecified type     WENDIE (generalized anxiety disorder)     Generalized anxiety disorder       PLAN:  Medication Management: Continue current medications.    Follow up in about 3 months (around 8/6/2024) for Medication follow up.     Medication List with Changes/Refills   New Medications    " DEXTROAMPHETAMINE-AMPHETAMINE (ADDERALL XR) 20 MG 24 HR CAPSULE    Take 1 capsule (20 mg total) by mouth every morning.    DEXTROAMPHETAMINE-AMPHETAMINE (ADDERALL XR) 20 MG 24 HR CAPSULE    Take 1 capsule (20 mg total) by mouth every morning.   Current Medications    DICYCLOMINE (BENTYL) 20 MG TABLET    Take 1 tablet (20 mg total) by mouth 3 (three) times daily as needed (abdominal pain).    FLUTICASONE PROPIONATE (FLONASE) 50 MCG/ACTUATION NASAL SPRAY    2 sprays (100 mcg total) by Each Nostril route once daily.    MELOXICAM (MOBIC) 15 MG TABLET    Take 1 tablet (15 mg total) by mouth daily as needed (wrist pain). Take with food.    ONDANSETRON (ZOFRAN-ODT) 4 MG TBDL    Take 4 mg by mouth 3 (three) times daily as needed.   Changed and/or Refilled Medications    Modified Medication Previous Medication    CLONIDINE (CATAPRES) 0.1 MG TABLET cloNIDine (CATAPRES) 0.1 MG tablet       Take 1 tablet (0.1 mg total) by mouth every evening.    Take 1 tablet (0.1 mg total) by mouth every evening.    DEXTROAMPHETAMINE-AMPHETAMINE (ADDERALL XR) 20 MG 24 HR CAPSULE dextroamphetamine-amphetamine (ADDERALL XR) 20 MG 24 hr capsule       Take 1 capsule (20 mg total) by mouth every morning.    Take 1 capsule (20 mg total) by mouth every morning.    FLUOXETINE 20 MG CAPSULE FLUoxetine 20 MG capsule       Take 1 capsule (20 mg total) by mouth once daily.    Take 1 capsule (20 mg total) by mouth once daily.    FLUOXETINE 40 MG CAPSULE FLUoxetine 40 MG capsule       Take 1 capsule (40 mg total) by mouth once daily.    Take 1 capsule (40 mg total) by mouth once daily.   Discontinued Medications    DEXMETHYLPHENIDATE (FOCALIN XR) 20 MG 24 HR CAPSULE    Take 1 capsule (20 mg total) by mouth once daily.      Time spent with pt including note preparation: 30 minutes     Alethea Torres, PhD, MP  Medical Psychologist

## 2024-05-06 ENCOUNTER — OFFICE VISIT (OUTPATIENT)
Dept: PSYCHIATRY | Facility: CLINIC | Age: 24
End: 2024-05-06
Payer: COMMERCIAL

## 2024-05-06 DIAGNOSIS — F41.1 GAD (GENERALIZED ANXIETY DISORDER): ICD-10-CM

## 2024-05-06 DIAGNOSIS — G47.00 INSOMNIA, UNSPECIFIED TYPE: ICD-10-CM

## 2024-05-06 DIAGNOSIS — F32.1 CURRENT MODERATE EPISODE OF MAJOR DEPRESSIVE DISORDER WITHOUT PRIOR EPISODE: ICD-10-CM

## 2024-05-06 DIAGNOSIS — F90.0 ATTENTION DEFICIT HYPERACTIVITY DISORDER (ADHD), PREDOMINANTLY INATTENTIVE TYPE: Primary | ICD-10-CM

## 2024-05-06 DIAGNOSIS — F41.1 GENERALIZED ANXIETY DISORDER: ICD-10-CM

## 2024-05-06 PROCEDURE — 99214 OFFICE O/P EST MOD 30 MIN: CPT | Mod: 95,,, | Performed by: PSYCHOLOGIST

## 2024-05-06 PROCEDURE — 1159F MED LIST DOCD IN RCRD: CPT | Mod: CPTII,95,, | Performed by: PSYCHOLOGIST

## 2024-05-06 RX ORDER — CLONIDINE HYDROCHLORIDE 0.1 MG/1
0.1 TABLET ORAL NIGHTLY
Qty: 90 TABLET | Refills: 3 | Status: SHIPPED | OUTPATIENT
Start: 2024-05-06 | End: 2025-05-06

## 2024-05-06 RX ORDER — DEXTROAMPHETAMINE SACCHARATE, AMPHETAMINE ASPARTATE MONOHYDRATE, DEXTROAMPHETAMINE SULFATE AND AMPHETAMINE SULFATE 5; 5; 5; 5 MG/1; MG/1; MG/1; MG/1
20 CAPSULE, EXTENDED RELEASE ORAL EVERY MORNING
Qty: 30 CAPSULE | Refills: 0 | Status: SHIPPED | OUTPATIENT
Start: 2024-07-03

## 2024-05-06 RX ORDER — FLUOXETINE HYDROCHLORIDE 40 MG/1
40 CAPSULE ORAL DAILY
Qty: 90 CAPSULE | Refills: 3 | Status: SHIPPED | OUTPATIENT
Start: 2024-05-06 | End: 2025-05-06

## 2024-05-06 RX ORDER — FLUOXETINE HYDROCHLORIDE 20 MG/1
20 CAPSULE ORAL DAILY
Qty: 90 CAPSULE | Refills: 3 | Status: SHIPPED | OUTPATIENT
Start: 2024-05-06 | End: 2025-05-06

## 2024-05-06 RX ORDER — DEXTROAMPHETAMINE SACCHARATE, AMPHETAMINE ASPARTATE MONOHYDRATE, DEXTROAMPHETAMINE SULFATE AND AMPHETAMINE SULFATE 5; 5; 5; 5 MG/1; MG/1; MG/1; MG/1
20 CAPSULE, EXTENDED RELEASE ORAL EVERY MORNING
Qty: 30 CAPSULE | Refills: 0 | Status: SHIPPED | OUTPATIENT
Start: 2024-06-05

## 2024-05-06 RX ORDER — DEXTROAMPHETAMINE SACCHARATE, AMPHETAMINE ASPARTATE MONOHYDRATE, DEXTROAMPHETAMINE SULFATE AND AMPHETAMINE SULFATE 5; 5; 5; 5 MG/1; MG/1; MG/1; MG/1
20 CAPSULE, EXTENDED RELEASE ORAL EVERY MORNING
Qty: 30 CAPSULE | Refills: 0 | Status: SHIPPED | OUTPATIENT
Start: 2024-05-06

## 2024-09-03 DIAGNOSIS — F90.0 ATTENTION DEFICIT HYPERACTIVITY DISORDER (ADHD), PREDOMINANTLY INATTENTIVE TYPE: ICD-10-CM

## 2024-09-04 RX ORDER — DEXTROAMPHETAMINE SACCHARATE, AMPHETAMINE ASPARTATE MONOHYDRATE, DEXTROAMPHETAMINE SULFATE AND AMPHETAMINE SULFATE 5; 5; 5; 5 MG/1; MG/1; MG/1; MG/1
20 CAPSULE, EXTENDED RELEASE ORAL EVERY MORNING
Qty: 30 CAPSULE | Refills: 0 | Status: SHIPPED | OUTPATIENT
Start: 2024-09-04

## 2024-10-27 DIAGNOSIS — F90.0 ATTENTION DEFICIT HYPERACTIVITY DISORDER (ADHD), PREDOMINANTLY INATTENTIVE TYPE: ICD-10-CM

## 2024-10-28 RX ORDER — DEXTROAMPHETAMINE SACCHARATE, AMPHETAMINE ASPARTATE MONOHYDRATE, DEXTROAMPHETAMINE SULFATE AND AMPHETAMINE SULFATE 5; 5; 5; 5 MG/1; MG/1; MG/1; MG/1
20 CAPSULE, EXTENDED RELEASE ORAL EVERY MORNING
Qty: 30 CAPSULE | Refills: 0 | Status: SHIPPED | OUTPATIENT
Start: 2024-10-28 | End: 2024-10-31 | Stop reason: SDUPTHER

## 2024-10-31 ENCOUNTER — OFFICE VISIT (OUTPATIENT)
Dept: PSYCHIATRY | Facility: CLINIC | Age: 24
End: 2024-10-31
Payer: COMMERCIAL

## 2024-10-31 DIAGNOSIS — F90.2 ATTENTION DEFICIT HYPERACTIVITY DISORDER (ADHD), COMBINED TYPE: Primary | ICD-10-CM

## 2024-10-31 DIAGNOSIS — G47.00 INSOMNIA, UNSPECIFIED TYPE: ICD-10-CM

## 2024-10-31 DIAGNOSIS — F90.0 ATTENTION DEFICIT HYPERACTIVITY DISORDER (ADHD), PREDOMINANTLY INATTENTIVE TYPE: ICD-10-CM

## 2024-10-31 DIAGNOSIS — F41.1 GENERALIZED ANXIETY DISORDER: ICD-10-CM

## 2024-10-31 DIAGNOSIS — F32.1 CURRENT MODERATE EPISODE OF MAJOR DEPRESSIVE DISORDER WITHOUT PRIOR EPISODE: ICD-10-CM

## 2024-10-31 DIAGNOSIS — F41.1 GAD (GENERALIZED ANXIETY DISORDER): ICD-10-CM

## 2024-10-31 PROCEDURE — 99213 OFFICE O/P EST LOW 20 MIN: CPT | Mod: S$GLB,,, | Performed by: PSYCHOLOGIST

## 2024-10-31 PROCEDURE — 1159F MED LIST DOCD IN RCRD: CPT | Mod: CPTII,S$GLB,, | Performed by: PSYCHOLOGIST

## 2024-10-31 PROCEDURE — 99999 PR PBB SHADOW E&M-EST. PATIENT-LVL II: CPT | Mod: PBBFAC,,, | Performed by: PSYCHOLOGIST

## 2024-10-31 RX ORDER — FLUOXETINE HYDROCHLORIDE 20 MG/1
20 CAPSULE ORAL DAILY
Qty: 90 CAPSULE | Refills: 3 | Status: SHIPPED | OUTPATIENT
Start: 2024-10-31 | End: 2025-10-31

## 2024-10-31 RX ORDER — DEXTROAMPHETAMINE SACCHARATE, AMPHETAMINE ASPARTATE MONOHYDRATE, DEXTROAMPHETAMINE SULFATE AND AMPHETAMINE SULFATE 5; 5; 5; 5 MG/1; MG/1; MG/1; MG/1
20 CAPSULE, EXTENDED RELEASE ORAL EVERY MORNING
Qty: 30 CAPSULE | Refills: 0 | Status: SHIPPED | OUTPATIENT
Start: 2024-11-29

## 2024-10-31 RX ORDER — FLUOXETINE HYDROCHLORIDE 40 MG/1
40 CAPSULE ORAL DAILY
Qty: 90 CAPSULE | Refills: 3 | Status: SHIPPED | OUTPATIENT
Start: 2024-10-31 | End: 2025-10-31

## 2024-10-31 RX ORDER — DEXTROAMPHETAMINE SACCHARATE, AMPHETAMINE ASPARTATE MONOHYDRATE, DEXTROAMPHETAMINE SULFATE AND AMPHETAMINE SULFATE 5; 5; 5; 5 MG/1; MG/1; MG/1; MG/1
20 CAPSULE, EXTENDED RELEASE ORAL EVERY MORNING
Qty: 30 CAPSULE | Refills: 0 | Status: SHIPPED | OUTPATIENT
Start: 2024-10-31

## 2024-10-31 RX ORDER — DEXTROAMPHETAMINE SACCHARATE, AMPHETAMINE ASPARTATE MONOHYDRATE, DEXTROAMPHETAMINE SULFATE AND AMPHETAMINE SULFATE 5; 5; 5; 5 MG/1; MG/1; MG/1; MG/1
20 CAPSULE, EXTENDED RELEASE ORAL EVERY MORNING
Qty: 30 CAPSULE | Refills: 0 | Status: SHIPPED | OUTPATIENT
Start: 2024-12-27

## 2024-10-31 RX ORDER — CLONIDINE HYDROCHLORIDE 0.1 MG/1
0.1 TABLET ORAL NIGHTLY
Qty: 90 TABLET | Refills: 3 | Status: SHIPPED | OUTPATIENT
Start: 2024-10-31 | End: 2025-10-31

## 2024-11-07 DIAGNOSIS — K58.2 IRRITABLE BOWEL SYNDROME WITH BOTH CONSTIPATION AND DIARRHEA: ICD-10-CM

## 2024-11-07 RX ORDER — DICYCLOMINE HYDROCHLORIDE 20 MG/1
20 TABLET ORAL 3 TIMES DAILY PRN
Qty: 90 TABLET | Refills: 11 | OUTPATIENT
Start: 2024-11-07

## 2024-11-09 ENCOUNTER — OFFICE VISIT (OUTPATIENT)
Dept: INTERNAL MEDICINE | Facility: CLINIC | Age: 24
End: 2024-11-09
Payer: COMMERCIAL

## 2024-11-09 DIAGNOSIS — K58.2 IRRITABLE BOWEL SYNDROME WITH BOTH CONSTIPATION AND DIARRHEA: ICD-10-CM

## 2024-11-09 DIAGNOSIS — F41.9 ANXIETY: Primary | ICD-10-CM

## 2024-11-09 PROCEDURE — 99213 OFFICE O/P EST LOW 20 MIN: CPT | Mod: 95,,, | Performed by: INTERNAL MEDICINE

## 2024-11-09 RX ORDER — DICYCLOMINE HYDROCHLORIDE 20 MG/1
20 TABLET ORAL 3 TIMES DAILY PRN
Qty: 90 TABLET | Refills: 11 | Status: SHIPPED | OUTPATIENT
Start: 2024-11-09

## 2024-11-09 NOTE — PROGRESS NOTES
The patient location is: [place]  The chief complaint leading to consultation is: f/u    Visit type: audiovisual    Face to Face time with patient:   20 minutes of total time spent on the encounter, which includes face to face time and non-face to face time preparing to see the patient (eg, review of tests), Obtaining and/or reviewing separately obtained history, Documenting clinical information in the electronic or other health record, Independently interpreting results (not separately reported) and communicating results to the patient/family/caregiver, or Care coordination (not separately reported).         Each patient to whom he or she provides medical services by telemedicine is:  (1) informed of the relationship between the physician and patient and the respective role of any other health care provider with respect to management of the patient; and (2) notified that he or she may decline to receive medical services by telemedicine and may withdraw from such care at any time.    Notes:     Subjective     Patient ID: Simon Goode is a 24 y.o. male.    Chief Complaint: No chief complaint on file.  stress    HPI  pt requesting help with consult for a therapist/counselor.  He sees Dr. Torres for medication management.  Denies any recent events but wants to be proactive.  No si/hi.  He requests refills of bentyl which helps with GI symptoms he associates with anxiety and stressful events.    Past Medical History:   Diagnosis Date    ADHD (attention deficit hyperactivity disorder)     Attention deficit disorder 11/8/2019    Diagnosed in middle school. Reports h/o seeing psychology or psychiatry.     Chronic constipation     Chronic diarrhea     Depression     GERD (gastroesophageal reflux disease)      Review of patient's allergies indicates:  No Known Allergies  No past surgical history on file.  Family History   Problem Relation Name Age of Onset    Depression Mother Misha Goode     Hypertension Father       Depression Brother Shyam     Cancer Paternal Grandfather Jake Goode         prostate     Social History     Socioeconomic History    Marital status: Single   Tobacco Use    Smoking status: Never     Passive exposure: Past    Smokeless tobacco: Never   Substance and Sexual Activity    Alcohol use: Not Currently     Alcohol/week: 1.0 standard drink of alcohol     Types: 1 Glasses of wine per week    Drug use: Not Currently     Frequency: 1.0 times per week     Types: Marijuana    Sexual activity: Not Currently     Partners: Female, Male   Social History Narrative    Brother vapes in the household. 1 cat in the household.     Social Drivers of Health     Financial Resource Strain: Low Risk  (1/9/2024)    Overall Financial Resource Strain (CARDIA)     Difficulty of Paying Living Expenses: Not very hard   Food Insecurity: No Food Insecurity (1/9/2024)    Hunger Vital Sign     Worried About Running Out of Food in the Last Year: Never true     Ran Out of Food in the Last Year: Never true   Transportation Needs: No Transportation Needs (1/9/2024)    PRAPARE - Transportation     Lack of Transportation (Medical): No     Lack of Transportation (Non-Medical): No   Physical Activity: Insufficiently Active (1/9/2024)    Exercise Vital Sign     Days of Exercise per Week: 2 days     Minutes of Exercise per Session: 20 min   Stress: No Stress Concern Present (1/9/2024)    [employer] [place] [employer] - Occupational Stress Questionnaire     Feeling of Stress : Only a little   Housing Stability: Low Risk  (1/9/2024)    Housing Stability Vital Sign     Unable to Pay for Housing in the Last Year: No     Number of Places Lived in the Last Year: 1     Unstable Housing in the Last Year: No         There were no vitals taken for this visit.  Outpatient Medications as of 11/9/2024   Medication Sig Dispense Refill    cloNIDine (CATAPRES) 0.1 MG tablet Take 1 tablet (0.1 mg total) by mouth every evening. 90 tablet 3     dextroamphetamine-amphetamine (ADDERALL XR) 20 MG 24 hr capsule Take 1 capsule (20 mg total) by mouth every morning. 30 capsule 0    [START ON 11/29/2024] dextroamphetamine-amphetamine (ADDERALL XR) 20 MG 24 hr capsule Take 1 capsule (20 mg total) by mouth every morning. 30 capsule 0    [START ON 12/27/2024] dextroamphetamine-amphetamine (ADDERALL XR) 20 MG 24 hr capsule Take 1 capsule (20 mg total) by mouth every morning. 30 capsule 0    FLUoxetine 20 MG capsule Take 1 capsule (20 mg total) by mouth once daily. 90 capsule 3    FLUoxetine 40 MG capsule Take 1 capsule (40 mg total) by mouth once daily. 90 capsule 3    fluticasone propionate (FLONASE) 50 mcg/actuation nasal spray 2 sprays (100 mcg total) by Each Nostril route once daily. 9.9 mL 0    meloxicam (MOBIC) 15 MG tablet Take 1 tablet (15 mg total) by mouth daily as needed (wrist pain). Take with food. 30 tablet 2    ondansetron (ZOFRAN-ODT) 4 MG TbDL Take 4 mg by mouth 3 (three) times daily as needed.       No current facility-administered medications on file as of 11/9/2024.       Review of Systems   Constitutional:  Positive for activity change. Negative for unexpected weight change.   HENT:  Negative for hearing loss, rhinorrhea and trouble swallowing.    Eyes:  Negative for discharge and visual disturbance.   Respiratory:  Negative for chest tightness and wheezing.    Cardiovascular:  Negative for chest pain and palpitations.   Gastrointestinal:  Positive for diarrhea. Negative for blood in stool, constipation and vomiting.   Endocrine: Negative for polydipsia and polyuria.   Genitourinary:  Negative for difficulty urinating, hematuria and urgency.   Musculoskeletal:  Negative for arthralgias, joint swelling and neck pain.   Neurological:  Negative for weakness and headaches.   Psychiatric/Behavioral:  Negative for confusion and dysphoric mood.    All other systems reviewed and are negative.         Objective     Physical Exam  Constitutional:        Appearance: Normal appearance.   HENT:      Head: Normocephalic and atraumatic.   Pulmonary:      Effort: No respiratory distress.   Neurological:      Mental Status: He is alert and oriented to person, place, and time.   Psychiatric:         Mood and Affect: Mood normal.         Behavior: Behavior normal.            Assessment and Plan     1. Anxiety  -     Ambulatory referral/consult to Social Work; Future; Expected date: 11/16/2024    2. Irritable bowel syndrome with both constipation and diarrhea  -     dicyclomine (BENTYL) 20 mg tablet; Take 1 tablet (20 mg total) by mouth 3 (three) times daily as needed (abdominal pain).  Dispense: 90 tablet; Refill: 11             Immunization History   Administered Date(s) Administered    COVID-19, MRNA, LN-S, PF (Pfizer) (Purple Cap) 03/05/2021, 03/23/2021    DTaP 2000, 2000, 01/10/2001, 01/10/2002, 08/31/2004    HIB 2000, 2000, 01/10/2001, 01/10/2002    HPV 9-Valent 07/24/2015, 01/14/2016, 06/14/2016    Hepatitis A, Pediatric/Adolescent, 2 Dose 09/01/2016    Hepatitis B, Pediatric/Adolescent 2000, 2000, 01/10/2001    IPV 2000, 2000, 01/10/2002, 08/31/2004    Influenza 11/13/2008, 10/23/2009, 10/30/2012    Influenza (Flumist) - Quadrivalent - Intranasal *Preferred* (2-49 years old) 11/13/2008, 10/23/2009, 10/30/2012, 10/10/2013, 12/11/2014, 01/14/2016    Influenza - Quadrivalent - PF *Preferred* (6 months and older) 01/04/2017, 11/29/2017, 09/29/2020, 11/29/2022, 12/21/2023    Influenza A (H1N1) 2009 Monovalent - Intranasal 02/03/2010, 03/10/2010    MMR 07/12/2001, 08/31/2004    Meningococcal B, OMV 07/26/2017, 11/29/2017    Meningococcal Conjugate 11/29/2017    Meningococcal Conjugate (MCV4P) 08/04/2011, 11/29/2017, 11/29/2017    Pneumococcal Conjugate - 7 Valent 2000, 2000, 01/10/2001, 07/12/2001    Tdap 08/04/2011, 02/26/2020    Varicella 07/12/2001, 07/29/2009

## 2025-01-24 ENCOUNTER — TELEPHONE (OUTPATIENT)
Dept: PSYCHIATRY | Facility: CLINIC | Age: 25
End: 2025-01-24
Payer: COMMERCIAL

## 2025-01-24 NOTE — TELEPHONE ENCOUNTER
Called Pt regarding getting the Pt rescheduled LVM for the pt to call back to schedule 01/24/2024.

## 2025-01-29 ENCOUNTER — TELEPHONE (OUTPATIENT)
Dept: PSYCHIATRY | Facility: CLINIC | Age: 25
End: 2025-01-29
Payer: COMMERCIAL

## 2025-01-29 NOTE — TELEPHONE ENCOUNTER
Called Pt regarding getting the Pt rescheduled LVM for the pt to call back to schedule 01/29/2024.

## 2025-02-10 DIAGNOSIS — F90.0 ATTENTION DEFICIT HYPERACTIVITY DISORDER (ADHD), PREDOMINANTLY INATTENTIVE TYPE: ICD-10-CM

## 2025-02-10 RX ORDER — DEXTROAMPHETAMINE SACCHARATE, AMPHETAMINE ASPARTATE MONOHYDRATE, DEXTROAMPHETAMINE SULFATE AND AMPHETAMINE SULFATE 5; 5; 5; 5 MG/1; MG/1; MG/1; MG/1
20 CAPSULE, EXTENDED RELEASE ORAL EVERY MORNING
Qty: 30 CAPSULE | Refills: 0 | Status: SHIPPED | OUTPATIENT
Start: 2025-02-10

## 2025-02-25 ENCOUNTER — OFFICE VISIT (OUTPATIENT)
Dept: INTERNAL MEDICINE | Facility: CLINIC | Age: 25
End: 2025-02-25
Payer: COMMERCIAL

## 2025-02-25 VITALS
HEART RATE: 116 BPM | RESPIRATION RATE: 18 BRPM | DIASTOLIC BLOOD PRESSURE: 80 MMHG | BODY MASS INDEX: 36.02 KG/M2 | WEIGHT: 257.25 LBS | TEMPERATURE: 99 F | HEIGHT: 71 IN | OXYGEN SATURATION: 99 % | SYSTOLIC BLOOD PRESSURE: 124 MMHG

## 2025-02-25 DIAGNOSIS — R05.9 COUGH, UNSPECIFIED TYPE: ICD-10-CM

## 2025-02-25 DIAGNOSIS — J06.9 VIRAL URI WITH COUGH: Primary | ICD-10-CM

## 2025-02-25 DIAGNOSIS — R09.81 NASAL CONGESTION: ICD-10-CM

## 2025-02-25 LAB
CTP QC/QA: YES
MOLECULAR STREP A: NEGATIVE
POC MOLECULAR INFLUENZA A AGN: NEGATIVE
POC MOLECULAR INFLUENZA B AGN: NEGATIVE
SARS-COV-2 RDRP RESP QL NAA+PROBE: NEGATIVE

## 2025-02-25 PROCEDURE — 99214 OFFICE O/P EST MOD 30 MIN: CPT | Mod: S$GLB,,, | Performed by: NURSE PRACTITIONER

## 2025-02-25 PROCEDURE — 99999 PR PBB SHADOW E&M-EST. PATIENT-LVL IV: CPT | Mod: PBBFAC,,, | Performed by: NURSE PRACTITIONER

## 2025-02-25 RX ORDER — AZITHROMYCIN 250 MG/1
TABLET, FILM COATED ORAL
Qty: 6 TABLET | Refills: 0 | Status: SHIPPED | OUTPATIENT
Start: 2025-02-25

## 2025-02-25 RX ORDER — FLUTICASONE PROPIONATE 50 MCG
2 SPRAY, SUSPENSION (ML) NASAL DAILY
Qty: 9.9 ML | Refills: 0 | Status: SHIPPED | OUTPATIENT
Start: 2025-02-25

## 2025-02-25 NOTE — LETTER
February 24,2025      The [address]  [address] [facility]  [place] [address]  Phone: [phone]  Fax: [phone]       Patient: Simon Goode   YOB: 2000  Date of Visit: 02/24/2025    To Whom It May Concern:    Lidya Goode  was at [facility] on 02/24/2025. The patient may return to work/school on 02/26/2025 with no restrictions. If you have any questions or concerns, or if I can be of further assistance, please do not hesitate to contact me.    Sincerely,    Beth Bonilla MA

## 2025-02-25 NOTE — PROGRESS NOTES
Subjective:       Patient ID: Simon Goode is a 24 y.o. male.    Chief Complaint: Cough and Nasal Congestion    Cough  This is a recurrent problem. The current episode started in the past 7 days. The problem has been waxing and waning. The problem occurs hourly. The cough is Productive of sputum. Associated symptoms include headaches, nasal congestion, postnasal drip and rhinorrhea. Pertinent negatives include no chest pain, chills, ear pain, fever, sore throat, shortness of breath or wheezing. The symptoms are aggravated by lying down. He has tried OTC cough suppressant and rest for the symptoms. The treatment provided mild relief.           Past Medical History:   Diagnosis Date    ADHD (attention deficit hyperactivity disorder)     Attention deficit disorder 11/8/2019    Diagnosed in middle school. Reports h/o seeing psychology or psychiatry.     Chronic constipation     Chronic diarrhea     Depression     GERD (gastroesophageal reflux disease)      History reviewed. No pertinent surgical history.  Social History[1]  Review of patient's allergies indicates:  No Known Allergies  Current Outpatient Medications   Medication Sig    cloNIDine (CATAPRES) 0.1 MG tablet Take 1 tablet (0.1 mg total) by mouth every evening.    dextroamphetamine-amphetamine (ADDERALL XR) 20 MG 24 hr capsule Take 1 capsule (20 mg total) by mouth every morning.    dextroamphetamine-amphetamine (ADDERALL XR) 20 MG 24 hr capsule Take 1 capsule (20 mg total) by mouth every morning.    dextroamphetamine-amphetamine (ADDERALL XR) 20 MG 24 hr capsule Take 1 capsule (20 mg total) by mouth every morning.    dicyclomine (BENTYL) 20 mg tablet Take 1 tablet (20 mg total) by mouth 3 (three) times daily as needed (abdominal pain).    FLUoxetine 20 MG capsule Take 1 capsule (20 mg total) by mouth once daily.    FLUoxetine 40 MG capsule Take 1 capsule (40 mg total) by mouth once daily.    meloxicam (MOBIC) 15 MG tablet Take 1 tablet (15 mg total) by mouth  daily as needed (wrist pain). Take with food.    ondansetron (ZOFRAN-ODT) 4 MG TbDL Take 4 mg by mouth 3 (three) times daily as needed.    azithromycin (Z-PARAG) 250 MG tablet Take as directed.    fluticasone propionate (FLONASE) 50 mcg/actuation nasal spray 2 sprays (100 mcg total) by Each Nostril route once daily.     No current facility-administered medications for this visit.           Review of Systems   Constitutional:  Negative for activity change, appetite change, chills, diaphoresis, fatigue, fever and unexpected weight change.   HENT:  Positive for postnasal drip and rhinorrhea. Negative for congestion, ear pain, sinus pressure, sinus pain, sneezing, sore throat, tinnitus, trouble swallowing and voice change.    Eyes:  Negative for photophobia, pain and visual disturbance.   Respiratory:  Positive for cough. Negative for chest tightness, shortness of breath and wheezing.    Cardiovascular:  Negative for chest pain, palpitations and leg swelling.   Gastrointestinal:  Negative for abdominal distention, abdominal pain, constipation, diarrhea, nausea and vomiting.   Genitourinary:  Negative for decreased urine volume, difficulty urinating, dysuria, flank pain, frequency, hematuria and urgency.   Musculoskeletal:  Negative for arthralgias, back pain, joint swelling, neck pain and neck stiffness.   Allergic/Immunologic: Negative for immunocompromised state.   Neurological:  Positive for headaches. Negative for dizziness, tremors, seizures, syncope, facial asymmetry, speech difficulty, weakness, light-headedness and numbness.   Hematological:  Negative for adenopathy. Does not bruise/bleed easily.   Psychiatric/Behavioral:  Negative for confusion and sleep disturbance.        Objective:      Physical Exam  Constitutional:       General: He is not in acute distress.  HENT:      Right Ear: Tympanic membrane is erythematous.      Left Ear: Tympanic membrane is erythematous.      Nose: Mucosal edema, congestion and  rhinorrhea present.      Mouth/Throat:      Mouth: No oral lesions.      Pharynx: Posterior oropharyngeal erythema present. No oropharyngeal exudate or uvula swelling.   Eyes:      Conjunctiva/sclera: Conjunctivae normal.      Pupils: Pupils are equal, round, and reactive to light.   Cardiovascular:      Heart sounds: No murmur heard.     No friction rub. No gallop.   Pulmonary:      Effort: No respiratory distress.      Breath sounds: No wheezing or rales.   Skin:     General: Skin is warm and dry.   Neurological:      Mental Status: He is alert and oriented to person, place, and time.         Assessment:     Vitals:    02/25/25 1448   BP: 124/80   Pulse: (!) 116   Resp: 18   Temp: 98.6 °F (37 °C)         1. Viral URI with cough    2. Cough, unspecified type    3. Nasal congestion        Plan:   Viral URI with cough    Cough, unspecified type  -     POCT Strep A, Molecular  -     POCT Influenza A/B Molecular  -     POCT COVID-19 Rapid Screening    Nasal congestion  -     POCT Strep A, Molecular  -     POCT Influenza A/B Molecular  -     POCT COVID-19 Rapid Screening  -     fluticasone propionate (FLONASE) 50 mcg/actuation nasal spray; 2 sprays (100 mcg total) by Each Nostril route once daily.  Dispense: 9.9 mL; Refill: 0    Other orders  -     azithromycin (Z-PARAG) 250 MG tablet; Take as directed.  Dispense: 6 tablet; Refill: 0             [1]   Social History  Socioeconomic History    Marital status: Single   Tobacco Use    Smoking status: Never     Passive exposure: Past    Smokeless tobacco: Never   Substance and Sexual Activity    Alcohol use: Not Currently     Alcohol/week: 1.0 standard drink of alcohol     Types: 1 Glasses of wine per week    Drug use: Not Currently     Frequency: 1.0 times per week     Types: Marijuana    Sexual activity: Not Currently     Partners: Female, Male   Social History Narrative    Brother vapes in the household. 1 cat in the household.     Social Drivers of Health     Financial  Resource Strain: Low Risk  (2/25/2025)    Overall Financial Resource Strain (CARDIA)     Difficulty of Paying Living Expenses: Not very hard   Food Insecurity: No Food Insecurity (2/25/2025)    Hunger Vital Sign     Worried About Running Out of Food in the Last Year: Never true     Ran Out of Food in the Last Year: Never true   Transportation Needs: No Transportation Needs (2/25/2025)    PRAPARE - Transportation     Lack of Transportation (Medical): No     Lack of Transportation (Non-Medical): No   Physical Activity: Sufficiently Active (2/25/2025)    Exercise Vital Sign     Days of Exercise per Week: 4 days     Minutes of Exercise per Session: 40 min   Stress: No Stress Concern Present (2/25/2025)    [employer] [place] [employer] - Occupational Stress Questionnaire     Feeling of Stress : Only a little   Housing Stability: Low Risk  (2/25/2025)    Housing Stability Vital Sign     Unable to Pay for Housing in the Last Year: No     Homeless in the Last Year: No

## 2025-03-05 ENCOUNTER — OFFICE VISIT (OUTPATIENT)
Dept: INTERNAL MEDICINE | Facility: CLINIC | Age: 25
End: 2025-03-05
Payer: COMMERCIAL

## 2025-03-05 VITALS
SYSTOLIC BLOOD PRESSURE: 128 MMHG | WEIGHT: 253.75 LBS | BODY MASS INDEX: 35.52 KG/M2 | OXYGEN SATURATION: 98 % | HEIGHT: 71 IN | HEART RATE: 92 BPM | DIASTOLIC BLOOD PRESSURE: 88 MMHG | TEMPERATURE: 99 F

## 2025-03-05 DIAGNOSIS — R09.81 SINUS CONGESTION: Primary | ICD-10-CM

## 2025-03-05 PROCEDURE — 1159F MED LIST DOCD IN RCRD: CPT | Mod: CPTII,S$GLB,, | Performed by: NURSE PRACTITIONER

## 2025-03-05 PROCEDURE — 3074F SYST BP LT 130 MM HG: CPT | Mod: CPTII,S$GLB,, | Performed by: NURSE PRACTITIONER

## 2025-03-05 PROCEDURE — 99214 OFFICE O/P EST MOD 30 MIN: CPT | Mod: 25,S$GLB,, | Performed by: NURSE PRACTITIONER

## 2025-03-05 PROCEDURE — 99999 PR PBB SHADOW E&M-EST. PATIENT-LVL IV: CPT | Mod: PBBFAC,,, | Performed by: NURSE PRACTITIONER

## 2025-03-05 PROCEDURE — 96372 THER/PROPH/DIAG INJ SC/IM: CPT | Mod: S$GLB,,, | Performed by: NURSE PRACTITIONER

## 2025-03-05 PROCEDURE — 3008F BODY MASS INDEX DOCD: CPT | Mod: CPTII,S$GLB,, | Performed by: NURSE PRACTITIONER

## 2025-03-05 PROCEDURE — 3079F DIAST BP 80-89 MM HG: CPT | Mod: CPTII,S$GLB,, | Performed by: NURSE PRACTITIONER

## 2025-03-05 RX ORDER — BENZONATATE 200 MG/1
200 CAPSULE ORAL 2 TIMES DAILY PRN
Qty: 20 CAPSULE | Refills: 0 | Status: SHIPPED | OUTPATIENT
Start: 2025-03-05 | End: 2025-03-15

## 2025-03-05 RX ORDER — TRIAMCINOLONE ACETONIDE 40 MG/ML
60 INJECTION, SUSPENSION INTRA-ARTICULAR; INTRAMUSCULAR
Status: COMPLETED | OUTPATIENT
Start: 2025-03-05 | End: 2025-03-05

## 2025-03-05 RX ADMIN — TRIAMCINOLONE ACETONIDE 60 MG: 40 INJECTION, SUSPENSION INTRA-ARTICULAR; INTRAMUSCULAR at 03:03

## 2025-03-05 NOTE — PROGRESS NOTES
Subjective:       Patient ID: Simon Goode is a 24 y.o. male.    Chief Complaint: Cough (Congestion for over two weeks took Z-Parag symptoms subsided but started back)    HPI    Returns due to cough/congestion   Finished zpack  Taking nyquil/theraflu  No fever    Past Medical History:   Diagnosis Date    ADHD (attention deficit hyperactivity disorder)     Attention deficit disorder 11/8/2019    Diagnosed in middle school. Reports h/o seeing psychology or psychiatry.     Chronic constipation     Chronic diarrhea     Depression     GERD (gastroesophageal reflux disease)      History reviewed. No pertinent surgical history.  Social History[1]  Review of patient's allergies indicates:  No Known Allergies  Current Outpatient Medications   Medication Sig    cloNIDine (CATAPRES) 0.1 MG tablet Take 1 tablet (0.1 mg total) by mouth every evening.    dextroamphetamine-amphetamine (ADDERALL XR) 20 MG 24 hr capsule Take 1 capsule (20 mg total) by mouth every morning.    dextroamphetamine-amphetamine (ADDERALL XR) 20 MG 24 hr capsule Take 1 capsule (20 mg total) by mouth every morning.    dextroamphetamine-amphetamine (ADDERALL XR) 20 MG 24 hr capsule Take 1 capsule (20 mg total) by mouth every morning.    dicyclomine (BENTYL) 20 mg tablet Take 1 tablet (20 mg total) by mouth 3 (three) times daily as needed (abdominal pain).    FLUoxetine 20 MG capsule Take 1 capsule (20 mg total) by mouth once daily.    FLUoxetine 40 MG capsule Take 1 capsule (40 mg total) by mouth once daily.    fluticasone propionate (FLONASE) 50 mcg/actuation nasal spray 2 sprays (100 mcg total) by Each Nostril route once daily.    meloxicam (MOBIC) 15 MG tablet Take 1 tablet (15 mg total) by mouth daily as needed (wrist pain). Take with food.    ondansetron (ZOFRAN-ODT) 4 MG TbDL Take 4 mg by mouth 3 (three) times daily as needed.    azithromycin (Z-PARAG) 250 MG tablet Take as directed. (Patient not taking: Reported on 3/5/2025)    benzonatate (TESSALON) 200  MG capsule Take 1 capsule (200 mg total) by mouth 2 (two) times daily as needed for Cough.     Current Facility-Administered Medications   Medication    triamcinolone acetonide injection 60 mg           Review of Systems   Constitutional:  Negative for activity change, appetite change, chills, diaphoresis, fatigue, fever and unexpected weight change.   HENT:  Positive for congestion. Negative for ear pain, postnasal drip, rhinorrhea, sinus pressure, sinus pain, sneezing, sore throat, tinnitus, trouble swallowing and voice change.    Eyes:  Negative for photophobia, pain and visual disturbance.   Respiratory:  Positive for cough. Negative for chest tightness, shortness of breath and wheezing.    Cardiovascular:  Negative for chest pain, palpitations and leg swelling.   Gastrointestinal:  Negative for abdominal distention, abdominal pain, constipation, diarrhea, nausea and vomiting.   Genitourinary:  Negative for decreased urine volume, difficulty urinating, dysuria, flank pain, frequency, hematuria and urgency.   Musculoskeletal:  Negative for arthralgias, back pain, joint swelling, neck pain and neck stiffness.   Allergic/Immunologic: Negative for immunocompromised state.   Neurological:  Negative for dizziness, tremors, seizures, syncope, facial asymmetry, speech difficulty, weakness, light-headedness, numbness and headaches.   Hematological:  Negative for adenopathy. Does not bruise/bleed easily.   Psychiatric/Behavioral:  Negative for confusion and sleep disturbance.        Objective:      Physical Exam  Vitals reviewed.   HENT:      Nose: Congestion and rhinorrhea present.   Cardiovascular:      Rate and Rhythm: Normal rate and regular rhythm.      Heart sounds: Normal heart sounds.   Pulmonary:      Effort: Pulmonary effort is normal.      Breath sounds: Normal breath sounds.   Neurological:      Mental Status: He is alert and oriented to person, place, and time.         Assessment:     Vitals:    03/05/25 1513    BP: 128/88   Pulse: 92   Temp: 98.7 °F (37.1 °C)         1. Sinus congestion        Plan:   Sinus congestion    Other orders  -     triamcinolone acetonide injection 60 mg  -     benzonatate (TESSALON) 200 MG capsule; Take 1 capsule (200 mg total) by mouth 2 (two) times daily as needed for Cough.  Dispense: 20 capsule; Refill: 0             [1]   Social History  Socioeconomic History    Marital status: Single   Tobacco Use    Smoking status: Never     Passive exposure: Past    Smokeless tobacco: Never   Substance and Sexual Activity    Alcohol use: Not Currently     Alcohol/week: 1.0 standard drink of alcohol     Types: 1 Glasses of wine per week    Drug use: Not Currently     Frequency: 1.0 times per week     Types: Marijuana    Sexual activity: Not Currently     Partners: Female, Male   Social History Narrative    Brother vapes in the household. 1 cat in the household.     Social Drivers of Health     Financial Resource Strain: Low Risk  (2/25/2025)    Overall Financial Resource Strain (CARDIA)     Difficulty of Paying Living Expenses: Not very hard   Food Insecurity: No Food Insecurity (2/25/2025)    Hunger Vital Sign     Worried About Running Out of Food in the Last Year: Never true     Ran Out of Food in the Last Year: Never true   Transportation Needs: No Transportation Needs (2/25/2025)    PRAPARE - Transportation     Lack of Transportation (Medical): No     Lack of Transportation (Non-Medical): No   Physical Activity: Sufficiently Active (2/25/2025)    Exercise Vital Sign     Days of Exercise per Week: 4 days     Minutes of Exercise per Session: 40 min   Stress: No Stress Concern Present (2/25/2025)    [employer] [place] [employer] - Occupational Stress Questionnaire     Feeling of Stress : Only a little   Housing Stability: Low Risk  (2/25/2025)    Housing Stability Vital Sign     Unable to Pay for Housing in the Last Year: No     Homeless in the Last Year: No

## 2025-03-11 ENCOUNTER — OFFICE VISIT (OUTPATIENT)
Dept: PRIMARY CARE CLINIC | Facility: CLINIC | Age: 25
End: 2025-03-11
Payer: COMMERCIAL

## 2025-03-11 DIAGNOSIS — R11.0 NAUSEA: Primary | ICD-10-CM

## 2025-03-11 PROCEDURE — 1159F MED LIST DOCD IN RCRD: CPT | Mod: CPTII,95,, | Performed by: INTERNAL MEDICINE

## 2025-03-11 PROCEDURE — 98005 SYNCH AUDIO-VIDEO EST LOW 20: CPT | Mod: 95,,, | Performed by: INTERNAL MEDICINE

## 2025-03-11 PROCEDURE — 1160F RVW MEDS BY RX/DR IN RCRD: CPT | Mod: CPTII,95,, | Performed by: INTERNAL MEDICINE

## 2025-03-11 RX ORDER — ONDANSETRON 4 MG/1
4 TABLET, ORALLY DISINTEGRATING ORAL EVERY 8 HOURS PRN
Qty: 30 TABLET | Refills: 0 | Status: SHIPPED | OUTPATIENT
Start: 2025-03-11

## 2025-03-11 NOTE — PROGRESS NOTES
The patient location is: [place]  The chief complaint leading to consultation is: f/u     Visit type: audiovisual    Face to Face time with patient:   20 minutes of total time spent on the encounter, which includes face to face time and non-face to face time preparing to see the patient (eg, review of tests), Obtaining and/or reviewing separately obtained history, Documenting clinical information in the electronic or other health record, Independently interpreting results (not separately reported) and communicating results to the patient/family/caregiver, or Care coordination (not separately reported).         Each patient to whom he or she provides medical services by telemedicine is:  (1) informed of the relationship between the physician and patient and the respective role of any other health care provider with respect to management of the patient; and (2) notified that he or she may decline to receive medical services by telemedicine and may withdraw from such care at any time.    Notes:     Subjective     Patient ID: Simon Goode is a 24 y.o. male.    Chief Complaint: No chief complaint on file.      HPI  3 days of nausea with an episode of emesis.  Hasn't thrown up since yesterday.  No watery stools or blood.  +cramping but relief with BM.  No fever, rash.  Holding down bland food and liquids.  Would like work excuse and nausea medication to have on hand.    Past Medical History:   Diagnosis Date    ADHD (attention deficit hyperactivity disorder)     Attention deficit disorder 11/8/2019    Diagnosed in middle school. Reports h/o seeing psychology or psychiatry.     Chronic constipation     Chronic diarrhea     Depression     GERD (gastroesophageal reflux disease)      Review of patient's allergies indicates:  No Known Allergies  History reviewed. No pertinent surgical history.  Family History   Problem Relation Name Age of Onset    Depression Mother Misha Goode     Hypertension Father      Depression Brother Shyam      Cancer Paternal Grandfather Jake Goode         prostate     Social History[1]      There were no vitals taken for this visit.  Outpatient Medications as of 3/11/2025   Medication Sig Dispense Refill    azithromycin (Z-PARAG) 250 MG tablet Take as directed. (Patient not taking: Reported on 3/5/2025) 6 tablet 0    benzonatate (TESSALON) 200 MG capsule Take 1 capsule (200 mg total) by mouth 2 (two) times daily as needed for Cough. 20 capsule 0    cloNIDine (CATAPRES) 0.1 MG tablet Take 1 tablet (0.1 mg total) by mouth every evening. 90 tablet 3    dextroamphetamine-amphetamine (ADDERALL XR) 20 MG 24 hr capsule Take 1 capsule (20 mg total) by mouth every morning. 30 capsule 0    dextroamphetamine-amphetamine (ADDERALL XR) 20 MG 24 hr capsule Take 1 capsule (20 mg total) by mouth every morning. 30 capsule 0    dextroamphetamine-amphetamine (ADDERALL XR) 20 MG 24 hr capsule Take 1 capsule (20 mg total) by mouth every morning. 30 capsule 0    dicyclomine (BENTYL) 20 mg tablet Take 1 tablet (20 mg total) by mouth 3 (three) times daily as needed (abdominal pain). 90 tablet 11    FLUoxetine 20 MG capsule Take 1 capsule (20 mg total) by mouth once daily. 90 capsule 3    FLUoxetine 40 MG capsule Take 1 capsule (40 mg total) by mouth once daily. 90 capsule 3    fluticasone propionate (FLONASE) 50 mcg/actuation nasal spray 2 sprays (100 mcg total) by Each Nostril route once daily. 9.9 mL 0    meloxicam (MOBIC) 15 MG tablet Take 1 tablet (15 mg total) by mouth daily as needed (wrist pain). Take with food. 30 tablet 2    ondansetron (ZOFRAN-ODT) 4 MG TbDL Take 4 mg by mouth 3 (three) times daily as needed.       No current facility-administered medications on file as of 3/11/2025.       Review of Systems   Constitutional:  Negative for activity change and unexpected weight change.   HENT:  Negative for hearing loss, rhinorrhea and trouble swallowing.    Eyes:  Negative for discharge and visual disturbance.   Respiratory:   Negative for chest tightness and wheezing.    Cardiovascular:  Negative for chest pain and palpitations.   Gastrointestinal:  Positive for diarrhea and vomiting. Negative for blood in stool and constipation.   Endocrine: Negative for polydipsia and polyuria.   Genitourinary:  Negative for difficulty urinating, hematuria and urgency.   Musculoskeletal:  Negative for arthralgias, joint swelling and neck pain.   Neurological:  Positive for weakness and headaches.   Psychiatric/Behavioral:  Negative for confusion and dysphoric mood.    All other systems reviewed and are negative.         Objective     Physical Exam  Constitutional:       Appearance: Normal appearance.   HENT:      Head: Normocephalic and atraumatic.   Pulmonary:      Effort: No respiratory distress.   Musculoskeletal:      Cervical back: Neck supple.   Neurological:      Mental Status: He is alert and oriented to person, place, and time.   Psychiatric:         Mood and Affect: Mood normal.         Behavior: Behavior normal.            Assessment and Plan     1. Nausea  -     ondansetron (ZOFRAN-ODT) 4 MG TbDL; Take 1 tablet (4 mg total) by mouth every 8 (eight) hours as needed (nausea).  Dispense: 30 tablet; Refill: 0       Thinks may be over the hump.      Suspect viral GE.  S/s to f/u on d/w pt  [place] diet  Water pedialyte    Immunization History   Administered Date(s) Administered    COVID-19, MRNA, LN-S, PF (Pfizer) (Purple Cap) 03/05/2021, 03/23/2021    DTaP 2000, 2000, 01/10/2001, 01/10/2002, 08/31/2004    HIB 2000, 2000, 01/10/2001, 01/10/2002    HPV 9-Valent 07/24/2015, 01/14/2016, 06/14/2016    Hepatitis A, Pediatric/Adolescent, 2 Dose 09/01/2016    Hepatitis B, Pediatric/Adolescent 2000, 2000, 01/10/2001    IPV 2000, 2000, 01/10/2002, 08/31/2004    Influenza 11/13/2008, 10/23/2009, 10/30/2012    Influenza (Flumist) - Quadrivalent - Intranasal *Preferred* (2-49 years old) 11/13/2008, 10/23/2009,  10/30/2012, 10/10/2013, 12/11/2014, 01/14/2016    Influenza - Quadrivalent - PF *Preferred* (6 months and older) 01/04/2017, 11/29/2017, 09/29/2020, 11/29/2022, 12/21/2023    Influenza A (H1N1) 2009 Monovalent - Intranasal 02/03/2010, 03/10/2010    MMR 07/12/2001, 08/31/2004    Meningococcal B, OMV 07/26/2017, 11/29/2017    Meningococcal Conjugate 11/29/2017    Meningococcal Conjugate (MCV4P) 08/04/2011, 11/29/2017, 11/29/2017    Pneumococcal Conjugate - 7 Valent 2000, 2000, 01/10/2001, 07/12/2001    Tdap 08/04/2011, 02/26/2020    Varicella 07/12/2001, 07/29/2009       This includes face to face time and non-face to face time preparing to see the patient (eg, review of tests), obtaining and/or reviewing separately obtained history, documenting clinical information in the electronic or other health record, independently interpreting results and communicating results to the patient/family/caregiver, or care coordinator.              [1]   Social History  Socioeconomic History    Marital status: Single   Tobacco Use    Smoking status: Never     Passive exposure: Past    Smokeless tobacco: Never   Substance and Sexual Activity    Alcohol use: Not Currently     Alcohol/week: 1.0 standard drink of alcohol     Types: 1 Glasses of wine per week    Drug use: Not Currently     Frequency: 1.0 times per week     Types: Marijuana    Sexual activity: Not Currently     Partners: Female, Male   Social History Narrative    Brother vapes in the household. 1 cat in the household.     Social Drivers of Health     Financial Resource Strain: Low Risk  (2/25/2025)    Overall Financial Resource Strain (CARDIA)     Difficulty of Paying Living Expenses: Not very hard   Food Insecurity: No Food Insecurity (2/25/2025)    Hunger Vital Sign     Worried About Running Out of Food in the Last Year: Never true     Ran Out of Food in the Last Year: Never true   Transportation Needs: No Transportation Needs (2/25/2025)    PRAPARE -  Transportation     Lack of Transportation (Medical): No     Lack of Transportation (Non-Medical): No   Physical Activity: Sufficiently Active (2/25/2025)    Exercise Vital Sign     Days of Exercise per Week: 4 days     Minutes of Exercise per Session: 40 min   Stress: No Stress Concern Present (2/25/2025)    [employer] - Occupational Stress Questionnaire     Feeling of Stress : Only a little   Housing Stability: Low Risk  (2/25/2025)    Housing Stability Vital Sign     Unable to Pay for Housing in the Last Year: No     Homeless in the Last Year: No

## 2025-03-11 NOTE — LETTER
March 11, 2025      [facility]  [id number]  [place] [address]  Phone: [phone]  Fax: [phone]       Patient: Simon Goode   YOB: 2000  Date of Visit: 03/11/2025    To Whom It May Concern:    Lidya Goode  was at [facility] on 03/11/2025. Please excuse absences from 3/10/2025, and 3/11/2025. He may return to work on 03/12/2025 with no restrictions. If you have any questions or concerns, or if I can be of further assistance, please do not hesitate to contact me.    Sincerely,        Dorcas Cardona MA

## 2025-03-31 ENCOUNTER — OFFICE VISIT (OUTPATIENT)
Dept: PSYCHIATRY | Facility: CLINIC | Age: 25
End: 2025-03-31
Payer: COMMERCIAL

## 2025-03-31 VITALS — DIASTOLIC BLOOD PRESSURE: 85 MMHG | SYSTOLIC BLOOD PRESSURE: 137 MMHG | HEART RATE: 103 BPM

## 2025-03-31 DIAGNOSIS — G47.00 INSOMNIA, UNSPECIFIED TYPE: ICD-10-CM

## 2025-03-31 DIAGNOSIS — F90.0 ATTENTION DEFICIT HYPERACTIVITY DISORDER (ADHD), PREDOMINANTLY INATTENTIVE TYPE: ICD-10-CM

## 2025-03-31 DIAGNOSIS — F32.1 CURRENT MODERATE EPISODE OF MAJOR DEPRESSIVE DISORDER WITHOUT PRIOR EPISODE: ICD-10-CM

## 2025-03-31 DIAGNOSIS — F41.0 GENERALIZED ANXIETY DISORDER WITH PANIC ATTACKS: Primary | ICD-10-CM

## 2025-03-31 DIAGNOSIS — F41.1 GENERALIZED ANXIETY DISORDER: ICD-10-CM

## 2025-03-31 DIAGNOSIS — F41.1 GENERALIZED ANXIETY DISORDER WITH PANIC ATTACKS: Primary | ICD-10-CM

## 2025-03-31 DIAGNOSIS — F90.2 ATTENTION DEFICIT HYPERACTIVITY DISORDER (ADHD), COMBINED TYPE: ICD-10-CM

## 2025-03-31 PROCEDURE — 99999 PR PBB SHADOW E&M-EST. PATIENT-LVL II: CPT | Mod: PBBFAC,,, | Performed by: PSYCHOLOGIST

## 2025-03-31 PROCEDURE — 3079F DIAST BP 80-89 MM HG: CPT | Mod: CPTII,S$GLB,, | Performed by: PSYCHOLOGIST

## 2025-03-31 PROCEDURE — 1159F MED LIST DOCD IN RCRD: CPT | Mod: CPTII,S$GLB,, | Performed by: PSYCHOLOGIST

## 2025-03-31 PROCEDURE — 3075F SYST BP GE 130 - 139MM HG: CPT | Mod: CPTII,S$GLB,, | Performed by: PSYCHOLOGIST

## 2025-03-31 PROCEDURE — 99214 OFFICE O/P EST MOD 30 MIN: CPT | Mod: S$GLB,,, | Performed by: PSYCHOLOGIST

## 2025-03-31 RX ORDER — FLUOXETINE HYDROCHLORIDE 40 MG/1
80 CAPSULE ORAL DAILY
Qty: 180 CAPSULE | Refills: 1 | Status: SHIPPED | OUTPATIENT
Start: 2025-03-31 | End: 2025-09-27

## 2025-03-31 RX ORDER — DEXTROAMPHETAMINE SACCHARATE, AMPHETAMINE ASPARTATE MONOHYDRATE, DEXTROAMPHETAMINE SULFATE AND AMPHETAMINE SULFATE 5; 5; 5; 5 MG/1; MG/1; MG/1; MG/1
20 CAPSULE, EXTENDED RELEASE ORAL EVERY MORNING
Qty: 30 CAPSULE | Refills: 0 | Status: SHIPPED | OUTPATIENT
Start: 2025-04-28

## 2025-03-31 RX ORDER — DEXTROAMPHETAMINE SACCHARATE, AMPHETAMINE ASPARTATE MONOHYDRATE, DEXTROAMPHETAMINE SULFATE AND AMPHETAMINE SULFATE 5; 5; 5; 5 MG/1; MG/1; MG/1; MG/1
20 CAPSULE, EXTENDED RELEASE ORAL EVERY MORNING
Qty: 30 CAPSULE | Refills: 0 | Status: SHIPPED | OUTPATIENT
Start: 2025-03-31

## 2025-03-31 RX ORDER — CLONAZEPAM 0.5 MG/1
.25-.5 TABLET ORAL 2 TIMES DAILY PRN
Qty: 60 TABLET | Refills: 2 | Status: SHIPPED | OUTPATIENT
Start: 2025-03-31 | End: 2025-06-29

## 2025-03-31 RX ORDER — DEXTROAMPHETAMINE SACCHARATE, AMPHETAMINE ASPARTATE MONOHYDRATE, DEXTROAMPHETAMINE SULFATE AND AMPHETAMINE SULFATE 5; 5; 5; 5 MG/1; MG/1; MG/1; MG/1
20 CAPSULE, EXTENDED RELEASE ORAL EVERY MORNING
Qty: 30 CAPSULE | Refills: 0 | Status: SHIPPED | OUTPATIENT
Start: 2025-05-26

## 2025-03-31 NOTE — PROGRESS NOTES
MEDICATION MANAGEMENT SESSION    Name: Simon Goode  Age: 24 y.o.  : 2000    Preferred Name: Simon    Referring provider: Dr. Clotilde Zhou    Reason for Visit:  Medication follow up    LAST VISIT 10/31/24:  Pt reports feeling very good since last appointment 24. His ADHD medication is working well--Adderall XR 20 mg. 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 He believes this negatively affected his mood so will likely take it consistently next summer and when on holiday breaks. He continues to do well with Prozac 60 mg qd, Clonidine 0.1 mg qhs. He does not report depression or anxiety symptoms that are significant; energy and appetite are fine; sleeping well. He decided to not attend [employer] party last week but was tired and disinterested not avoidant due to anxiety. He enjoys the holiday time and looks forward to spending time with family.     ADHD: Symptoms managed with Adderall XR 20 mg   Depressive Disorder: denied   Anxiety Disorder: denied   Panic Disorder: denied   Manic Disorder: denied   Psychotic Disorder: denied   Substance Use:  denied     CURRENT VISIT:  Pt reports significant increase in anxiety, with constant worry, physiological hyperarousal (heart palpitations), nausea almost daily as leaves home for work, fatigue, gets overwhelmed anticipating what people might say to him, how he will handle it. He realizes his fears aren't rational but they continue. He attributes recent increase in symptoms after doing well for so long to planned move and relocation next year. 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 Provider suggested anxious arousal may be exacerbated by Adderall; should discuss decreased dose, but pt states Adderall has gone well for him, never before caused anxiety; not sure he could function at teaching job without it.    Discussed increase to Prozac 80 mg  "qd; he's taken 20-60 mg qd since 2019 with good efficacy; discussed Klonopin 0.25-0.5 mg prn for episodic anxiety in current context; reviewed associated benefits and risks.    PLAN:   Titrate to Prozac 80 mg qd;   Start Klonopin 0.25-0.5 mg bid prn;   Continue Adderall XR 20 mg qd;   RTC in 4 weeks.     ADHD: Symptoms managed with Adderall XR 20 mg   Depressive Disorder: tired/fatigued, somatic symptoms, social isolation/withdrawal   Anxiety Disorder: anxiety/nervousness, hyperarousal symptoms, fatigue, excessive worry, nausea, anticipate ppl will say something--   Panic Disorder: denied   Manic Disorder: denied   Psychotic Disorder: denied   Substance Use:  denied      Review of Systems   Constitutional:  Positive for fatigue. Negative for activity change, appetite change and unexpected weight change.   Respiratory:  Negative for shortness of breath.    Gastrointestinal:  Positive for nausea and vomiting.   Psychiatric/Behavioral:  Positive for dysphoric mood. Negative for agitation, behavioral problems, confusion, decreased concentration, hallucinations, self-injury, sleep disturbance and suicidal ideas. The patient is nervous/anxious. The patient is not hyperactive.       Constitutional:  Vitals:  Most recent vital signs were reviewed.   Last 3 sets of Vitals        2/25/2025     2:48 PM 3/5/2025     3:13 PM 3/31/2025     1:49 PM   Vitals - 1 value per visit   SYSTOLIC 124 128 137   DIASTOLIC 80 88 85   Pulse 116 92 103   Temp 98.6 °F (37 °C) 98.7 °F (37.1 °C)    Resp 18     SPO2 99 % 98 %    Weight (lb) 257.28 253.75    Weight (kg) 116.7 115.1    Height 5' 11" (1.803 m) 5' 11" (1.803 m)    BMI (Calculated) 35.9 35.4    Pain Score Zero Zero           Psychiatric:  Oriented: x 3   Attitude: cooperative   Eye Contact: good   Behavior: wnl   Mood: "okay, more anxious lately"  Affect: appropriate range   Attention: intact   Concentration: grossly intact   Thought Process: goal directed   Speech: " intelligible  Volume: WNL   Quantity: WNL   Rhythm: WNL  Insight: fair to good   Threats: no SI / HI   Memory: Intact  Psychosis: denies all   Estimate of Intellectual Function: average   Judgment:  good  Relevant Elements of Neurological Exam: normal gait     Allergy Review:   Review of patient's allergies indicates:  No Known Allergies     Medical Problem List:   Problem List[1]     Encounter Diagnoses   Name Primary?    Attention deficit hyperactivity disorder (ADHD), predominantly inattentive type     Insomnia, unspecified type     Current moderate episode of major depressive disorder without prior episode     Generalized anxiety disorder with panic attacks Yes    Generalized anxiety disorder     Attention deficit hyperactivity disorder (ADHD), combined type       PLAN:  Medication Management: Continue current medications. Discussed risks, benefits, and alternatives to treatment plan documented above with patient. I answered all patient questions related to this plan, and patient expressed understanding and agreement.      Follow up in about 4 weeks (around 4/28/2025) for Medication follow up.     Medication List with Changes/Refills   New Medications    CLONAZEPAM (KLONOPIN) 0.5 MG TABLET    Take 0.5-1 tablets (0.25-0.5 mg total) by mouth 2 (two) times daily as needed for Anxiety.   Current Medications    CLONIDINE (CATAPRES) 0.1 MG TABLET    Take 1 tablet (0.1 mg total) by mouth every evening.    DICYCLOMINE (BENTYL) 20 MG TABLET    Take 1 tablet (20 mg total) by mouth 3 (three) times daily as needed (abdominal pain).    FLUTICASONE PROPIONATE (FLONASE) 50 MCG/ACTUATION NASAL SPRAY    2 sprays (100 mcg total) by Each Nostril route once daily.    MELOXICAM (MOBIC) 15 MG TABLET    Take 1 tablet (15 mg total) by mouth daily as needed (wrist pain). Take with food.    ONDANSETRON (ZOFRAN-ODT) 4 MG TBDL    Take 4 mg by mouth 3 (three) times daily as needed.    ONDANSETRON (ZOFRAN-ODT) 4 MG TBDL    Take 1 tablet (4  mg total) by mouth every 8 (eight) hours as needed (nausea).   Changed and/or Refilled Medications    Modified Medication Previous Medication    DEXTROAMPHETAMINE-AMPHETAMINE (ADDERALL XR) 20 MG 24 HR CAPSULE dextroamphetamine-amphetamine (ADDERALL XR) 20 MG 24 hr capsule       Take 1 capsule (20 mg total) by mouth every morning.    Take 1 capsule (20 mg total) by mouth every morning.    DEXTROAMPHETAMINE-AMPHETAMINE (ADDERALL XR) 20 MG 24 HR CAPSULE dextroamphetamine-amphetamine (ADDERALL XR) 20 MG 24 hr capsule       Take 1 capsule (20 mg total) by mouth every morning.    Take 1 capsule (20 mg total) by mouth every morning.    DEXTROAMPHETAMINE-AMPHETAMINE (ADDERALL XR) 20 MG 24 HR CAPSULE dextroamphetamine-amphetamine (ADDERALL XR) 20 MG 24 hr capsule       Take 1 capsule (20 mg total) by mouth every morning.    Take 1 capsule (20 mg total) by mouth every morning.    FLUOXETINE 40 MG CAPSULE FLUoxetine 40 MG capsule       Take 2 capsules (80 mg total) by mouth once daily.    Take 1 capsule (40 mg total) by mouth once daily.   Discontinued Medications    AZITHROMYCIN (Z-PARAG) 250 MG TABLET    Take as directed.    FLUOXETINE 20 MG CAPSULE    Take 1 capsule (20 mg total) by mouth once daily.      Time spent with pt including note preparation: 35 minutes     Alethea Torres, PhD, MP  Medical Psychologist         [1]   Patient Active Problem List  Diagnosis    Current moderate episode of major depressive disorder without prior episode    Attention deficit hyperactivity disorder (ADHD), predominantly hyperactive type    Generalized anxiety disorder

## 2025-04-18 DIAGNOSIS — R09.81 NASAL CONGESTION: ICD-10-CM

## 2025-04-21 RX ORDER — FLUTICASONE PROPIONATE 50 MCG
2 SPRAY, SUSPENSION (ML) NASAL
Qty: 24 ML | Refills: 1 | Status: SHIPPED | OUTPATIENT
Start: 2025-04-21

## 2025-05-05 DIAGNOSIS — R09.81 NASAL CONGESTION: ICD-10-CM

## 2025-05-05 NOTE — TELEPHONE ENCOUNTER
No care due was identified.  [facility] Embedded Care Due Messages. Reference number: [id number].   5/05/2025 3:29:17 PM CDT

## 2025-05-06 RX ORDER — FLUTICASONE PROPIONATE 50 MCG
2 SPRAY, SUSPENSION (ML) NASAL DAILY
Qty: 48 ML | Refills: 3 | Status: SHIPPED | OUTPATIENT
Start: 2025-05-06

## 2025-05-06 NOTE — TELEPHONE ENCOUNTER
Refill Routing Note   Medication(s) are not appropriate for processing by [facility] for the following reason(s):        New or recently adjusted medication: less than 90 days under the signature of responsible provider    [facility] action(s):  Defer             Appointments  past 12m or future 3m with PCP    Date Provider   Last Visit   3/11/2025 Yudy Conway MD   Next Visit   Visit date not found Yudy Conway MD   ED visits in past 90 days: 0        Note composed:11:12 PM 05/05/2025

## 2025-05-15 ENCOUNTER — OFFICE VISIT (OUTPATIENT)
Dept: PRIMARY CARE CLINIC | Facility: CLINIC | Age: 25
End: 2025-05-15
Payer: COMMERCIAL

## 2025-05-15 DIAGNOSIS — G47.9 TROUBLE IN SLEEPING: Primary | ICD-10-CM

## 2025-05-15 DIAGNOSIS — G47.19 EXCESSIVE DAYTIME SLEEPINESS: ICD-10-CM

## 2025-05-15 DIAGNOSIS — R06.83 SNORES: ICD-10-CM

## 2025-05-15 DIAGNOSIS — R53.83 TIRED: ICD-10-CM

## 2025-05-15 PROCEDURE — 98005 SYNCH AUDIO-VIDEO EST LOW 20: CPT | Mod: 95,,, | Performed by: INTERNAL MEDICINE

## 2025-05-15 PROCEDURE — 1159F MED LIST DOCD IN RCRD: CPT | Mod: CPTII,95,, | Performed by: INTERNAL MEDICINE

## 2025-05-15 PROCEDURE — 1160F RVW MEDS BY RX/DR IN RCRD: CPT | Mod: CPTII,95,, | Performed by: INTERNAL MEDICINE

## 2025-05-15 NOTE — PROGRESS NOTES
The patient location is: [place]  The chief complaint leading to consultation is: sleeping problem    Visit type: audiovisual    Face to Face time with patient:   20 minutes of total time spent on the encounter, which includes face to face time and non-face to face time preparing to see the patient (eg, review of tests), Obtaining and/or reviewing separately obtained history, Documenting clinical information in the electronic or other health record, Independently interpreting results (not separately reported) and communicating results to the patient/family/caregiver, or Care coordination (not separately reported).         Each patient to whom he or she provides medical services by telemedicine is:  (1) informed of the relationship between the physician and patient and the respective role of any other health care provider with respect to management of the patient; and (2) notified that he or she may decline to receive medical services by telemedicine and may withdraw from such care at any time.    Answers submitted by the patient for this visit:  Review of Systems Questionnaire (Submitted on 5/15/2025)  activity change: Yes  unexpected weight change: No  neck pain: No  hearing loss: No  rhinorrhea: No  trouble swallowing: No  eye discharge: No  visual disturbance: No  chest tightness: No  wheezing: No  chest pain: No  palpitations: No  blood in stool: No  constipation: No  vomiting: No  diarrhea: No  polydipsia: No  polyuria: No  difficulty urinating: No  urgency: No  hematuria: No  joint swelling: No  arthralgias: No  headaches: Yes  weakness: No  confusion: No  dysphoric mood: No    Subjective     Patient ID: Simon Goode is a 24 y.o. male.    Chief Complaint: No chief complaint on file.      History of Present Illness    CHIEF COMPLAINT:  Mr. Goode presents today for evaluation of chronic sleep problems.    SLEEP HISTORY:  He reports chronic sleep problems dating back as far as he can remember, characterized by  frequent nocturnal awakenings and daytime somnolence in quiet settings. Others have noted that he snores. Multiple sleep interventions, including OTC medications and psychiatrist-prescribed Clonidine, were initially effective but experienced diminishing efficacy over time.  Could fall asleep during day.    FAMILY HISTORY:  He has a strong family history of sleep apnea, including father (recently diagnosed), both grandparents, aunt, and uncle.      ROS:  General: -fever, -chills, -fatigue, -weight gain, -weight loss  Eyes: -vision changes, -redness, -discharge  ENT: -ear pain, -nasal congestion, -sore throat  Cardiovascular: -chest pain, -palpitations, -lower extremity edema  Respiratory: -cough, -shortness of breath, +snoring  Gastrointestinal: -abdominal pain, -nausea, -vomiting, -diarrhea, -constipation, -blood in stool  Genitourinary: -dysuria, -hematuria, -frequency  Musculoskeletal: -joint pain, -muscle pain  Skin: -rash, -lesion  Neurological: -headache, -dizziness, -numbness, -tingling, +difficulty staying asleep, +sleep disturbances  Psychiatric: -anxiety, -depression, +sleep difficulty          Past Medical History:   Diagnosis Date    ADHD (attention deficit hyperactivity disorder)     Attention deficit disorder 11/8/2019    Diagnosed in middle school. Reports h/o seeing psychology or psychiatry.     Chronic constipation     Chronic diarrhea     Depression     GERD (gastroesophageal reflux disease)      Review of patient's allergies indicates:  No Known Allergies  History reviewed. No pertinent surgical history.  Family History   Problem Relation Name Age of Onset    Depression Mother Misha Goode     Hypertension Father      Depression Brother Shyam     Cancer Paternal Grandfather Jake Goode         prostate     Social History[1]      There were no vitals taken for this visit.  Outpatient Medications as of 5/15/2025   Medication Sig Dispense Refill    clonazePAM (KLONOPIN) 0.5 MG tablet Take 0.5-1 tablets  (0.25-0.5 mg total) by mouth 2 (two) times daily as needed for Anxiety. 60 tablet 2    cloNIDine (CATAPRES) 0.1 MG tablet Take 1 tablet (0.1 mg total) by mouth every evening. 90 tablet 3    dextroamphetamine-amphetamine (ADDERALL XR) 20 MG 24 hr capsule Take 1 capsule (20 mg total) by mouth every morning. 30 capsule 0    dextroamphetamine-amphetamine (ADDERALL XR) 20 MG 24 hr capsule Take 1 capsule (20 mg total) by mouth every morning. 30 capsule 0    [START ON 5/26/2025] dextroamphetamine-amphetamine (ADDERALL XR) 20 MG 24 hr capsule Take 1 capsule (20 mg total) by mouth every morning. 30 capsule 0    dicyclomine (BENTYL) 20 mg tablet Take 1 tablet (20 mg total) by mouth 3 (three) times daily as needed (abdominal pain). 90 tablet 11    FLUoxetine 40 MG capsule Take 2 capsules (80 mg total) by mouth once daily. 180 capsule 1    fluticasone propionate (FLONASE) 50 mcg/actuation nasal spray 2 sprays (100 mcg total) by Each Nostril route once daily. 48 mL 3    meloxicam (MOBIC) 15 MG tablet Take 1 tablet (15 mg total) by mouth daily as needed (wrist pain). Take with food. 30 tablet 2    ondansetron (ZOFRAN-ODT) 4 MG TbDL Take 4 mg by mouth 3 (three) times daily as needed.      ondansetron (ZOFRAN-ODT) 4 MG TbDL Take 1 tablet (4 mg total) by mouth every 8 (eight) hours as needed (nausea). 30 tablet 0     No current facility-administered medications on file as of 5/15/2025.              Objective     Physical Exam    General: No acute distress. Well-developed. Well-nourished.  BMI:  overweight  Psychiatric: Normal mood. Normal affect. Good insight. Good judgment.         Assessment and Plan     1. Trouble in sleeping  -     Home Sleep Study; Future    2. Excessive daytime sleepiness  -     Home Sleep Study; Future    3. Tired  -     Home Sleep Study; Future    4. Snores  -     Home Sleep Study; Future             Immunization History   Administered Date(s) Administered    COVID-19, MRNA, LN-S, PF (Pfizer) (Purple Cap)  03/05/2021, 03/23/2021    DTaP 2000, 2000, 01/10/2001, 01/10/2002, 08/31/2004    HIB 2000, 2000, 01/10/2001, 01/10/2002    HPV 9-Valent 07/24/2015, 01/14/2016, 06/14/2016    Hepatitis A, Pediatric/Adolescent, 2 Dose 09/01/2016    Hepatitis B, Pediatric/Adolescent 2000, 2000, 01/10/2001    IPV 2000, 2000, 01/10/2002, 08/31/2004    Influenza 11/13/2008, 10/23/2009, 10/30/2012    Influenza (Flumist) - Quadrivalent - Intranasal *Preferred* (2-49 years old) 11/13/2008, 10/23/2009, 10/30/2012, 10/10/2013, 12/11/2014, 01/14/2016    Influenza - Quadrivalent - PF *Preferred* (6 months and older) 01/04/2017, 11/29/2017, 09/29/2020, 11/29/2022, 12/21/2023    Influenza A (H1N1) 2009 Monovalent - Intranasal 02/03/2010, 03/10/2010    MMR 07/12/2001, 08/31/2004    Meningococcal B, OMV 07/26/2017, 11/29/2017    Meningococcal Conjugate 11/29/2017    Meningococcal Conjugate (MCV4P) 08/04/2011, 11/29/2017, 11/29/2017    Pneumococcal Conjugate - 7 Valent 2000, 2000, 01/10/2001, 07/12/2001    Tdap 08/04/2011, 02/26/2020    Varicella 07/12/2001, 07/29/2009              This note was generated with the assistance of ambient listening technology. Verbal consent was obtained by the patient and accompanying visitor(s) for the recording of patient appointment to facilitate this note. I attest to having reviewed and edited the generated note for accuracy, though some syntax or spelling errors may persist. Please contact the author of this note for any clarification.           [1]   Social History  Socioeconomic History    Marital status: Single   Tobacco Use    Smoking status: Never     Passive exposure: Past    Smokeless tobacco: Never   Substance and Sexual Activity    Alcohol use: Not Currently     Alcohol/week: 1.0 standard drink of alcohol     Types: 1 Glasses of wine per week    Drug use: Not Currently     Frequency: 1.0 times per week     Types: Marijuana    Sexual activity:  Not Currently     Partners: Female, Male   Social History Narrative    Brother vapes in the household. 1 cat in the household.     Social Drivers of Health     Financial Resource Strain: Low Risk  (2/25/2025)    Overall Financial Resource Strain (CARDIA)     Difficulty of Paying Living Expenses: Not very hard   Food Insecurity: No Food Insecurity (2/25/2025)    Hunger Vital Sign     Worried About Running Out of Food in the Last Year: Never true     Ran Out of Food in the Last Year: Never true   Transportation Needs: No Transportation Needs (2/25/2025)    PRAPARE - Transportation     Lack of Transportation (Medical): No     Lack of Transportation (Non-Medical): No   Physical Activity: Sufficiently Active (2/25/2025)    Exercise Vital Sign     Days of Exercise per Week: 4 days     Minutes of Exercise per Session: 40 min   Stress: No Stress Concern Present (2/25/2025)    [employer] - Occupational Stress Questionnaire     Feeling of Stress : Only a little   Housing Stability: Low Risk  (2/25/2025)    Housing Stability Vital Sign     Unable to Pay for Housing in the Last Year: No     Homeless in the Last Year: No

## 2025-05-21 ENCOUNTER — TELEPHONE (OUTPATIENT)
Dept: SLEEP MEDICINE | Facility: CLINIC | Age: 25
End: 2025-05-21
Payer: COMMERCIAL

## 2025-05-26 ENCOUNTER — OFFICE VISIT (OUTPATIENT)
Dept: PSYCHIATRY | Facility: CLINIC | Age: 25
End: 2025-05-26
Payer: COMMERCIAL

## 2025-05-26 DIAGNOSIS — F32.1 CURRENT MODERATE EPISODE OF MAJOR DEPRESSIVE DISORDER WITHOUT PRIOR EPISODE: ICD-10-CM

## 2025-05-26 DIAGNOSIS — F41.0 GENERALIZED ANXIETY DISORDER WITH PANIC ATTACKS: Primary | ICD-10-CM

## 2025-05-26 DIAGNOSIS — F90.2 ATTENTION DEFICIT HYPERACTIVITY DISORDER (ADHD), COMBINED TYPE: ICD-10-CM

## 2025-05-26 DIAGNOSIS — G47.00 INSOMNIA, UNSPECIFIED TYPE: ICD-10-CM

## 2025-05-26 DIAGNOSIS — F41.1 GENERALIZED ANXIETY DISORDER WITH PANIC ATTACKS: Primary | ICD-10-CM

## 2025-05-26 DIAGNOSIS — F41.1 GENERALIZED ANXIETY DISORDER: ICD-10-CM

## 2025-05-26 DIAGNOSIS — F90.0 ATTENTION DEFICIT HYPERACTIVITY DISORDER (ADHD), PREDOMINANTLY INATTENTIVE TYPE: ICD-10-CM

## 2025-05-26 PROCEDURE — 1159F MED LIST DOCD IN RCRD: CPT | Mod: CPTII,95,, | Performed by: PSYCHOLOGIST

## 2025-05-26 PROCEDURE — 98006 SYNCH AUDIO-VIDEO EST MOD 30: CPT | Mod: 95,,, | Performed by: PSYCHOLOGIST

## 2025-05-26 RX ORDER — DEXTROAMPHETAMINE SACCHARATE, AMPHETAMINE ASPARTATE MONOHYDRATE, DEXTROAMPHETAMINE SULFATE AND AMPHETAMINE SULFATE 5; 5; 5; 5 MG/1; MG/1; MG/1; MG/1
20 CAPSULE, EXTENDED RELEASE ORAL EVERY MORNING
Qty: 30 CAPSULE | Refills: 0 | Status: SHIPPED | OUTPATIENT
Start: 2025-06-23

## 2025-05-26 RX ORDER — CLONIDINE HYDROCHLORIDE 0.1 MG/1
0.1 TABLET ORAL NIGHTLY
Qty: 90 TABLET | Refills: 3 | Status: SHIPPED | OUTPATIENT
Start: 2025-05-26 | End: 2026-05-26

## 2025-05-26 RX ORDER — CLONAZEPAM 0.5 MG/1
.25-.5 TABLET ORAL 2 TIMES DAILY PRN
Qty: 60 TABLET | Refills: 2 | Status: SHIPPED | OUTPATIENT
Start: 2025-05-26 | End: 2025-08-24

## 2025-05-26 RX ORDER — DEXTROAMPHETAMINE SACCHARATE, AMPHETAMINE ASPARTATE MONOHYDRATE, DEXTROAMPHETAMINE SULFATE AND AMPHETAMINE SULFATE 5; 5; 5; 5 MG/1; MG/1; MG/1; MG/1
20 CAPSULE, EXTENDED RELEASE ORAL EVERY MORNING
Qty: 30 CAPSULE | Refills: 0 | Status: SHIPPED | OUTPATIENT
Start: 2025-07-20

## 2025-05-26 RX ORDER — DEXTROAMPHETAMINE SACCHARATE, AMPHETAMINE ASPARTATE MONOHYDRATE, DEXTROAMPHETAMINE SULFATE AND AMPHETAMINE SULFATE 5; 5; 5; 5 MG/1; MG/1; MG/1; MG/1
20 CAPSULE, EXTENDED RELEASE ORAL EVERY MORNING
Qty: 30 CAPSULE | Refills: 0 | Status: SHIPPED | OUTPATIENT
Start: 2025-05-26

## 2025-05-26 RX ORDER — FLUOXETINE HYDROCHLORIDE 40 MG/1
80 CAPSULE ORAL DAILY
Qty: 180 CAPSULE | Refills: 1 | Status: SHIPPED | OUTPATIENT
Start: 2025-05-26 | End: 2025-11-22

## 2025-05-26 NOTE — PROGRESS NOTES
"MEDICATION MANAGEMENT SESSION: VIRTUAL    Name: Simon Goode  Age: 24 y.o.  : 2000    Preferred Name: Simon    Site: [place]--via virtual visit with synchronous audio and video. Patient presented at home, in the [facility].   Each patient to whom medical services by telemedicine is provided is:   (1) informed of the relationship between the physician and patient and the respective role of any other health care provider with respect to management of the patient;   (2) notified that he or she may decline to receive medical services by telemedicine and may withdraw from such care at any time.    Referring provider: Dr. Clotilde Zhou    Reason for Visit:  Medication follow up    History of Present Illness    CHIEF COMPLAINT:  Patient presents for a follow-up visit to discuss the effectiveness of recent medication adjustments for anxiety and sleep issues.    HPI:  Patient reports a significant reduction in anxiety after starting Klonopin at a dose equivalent to 0.125 mg, although he experienced low energy levels and difficulty focusing. Klonopin was particularly helpful in managing evening anxiety. An increase in Prozac dosage to 80 mg has been beneficial, with the patient reporting that his general anxiety has been "reduced significantly," and intrusive thoughts or worries have "decreased a lot."    Patient has been using clonidine (Catapres) 0.1 mg nightly for sleep. Initially effective, he has been experiencing sleep disturbances for approximately the past month, waking up throughout the night and feeling the need to breathe deeply. He reports feeling tired in the mornings.    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 Patient describes his current mood as "very good" with no depression present.    Patient mentions that his father " was recently diagnosed with sleep apnea, and several other family members also have sleep apnea. A sleep study has been ordered by another doctor and is expected to be conducted soon.    MEDICATIONS:  Patient is on Klonopin, 0.125 mg equivalent, for anxiety. While effective in reducing anxiety, it has caused low energy and difficulty focusing. He is also on Prozac 80 mg for anxiety, which has been effective in reducing general anxiety and worrying thoughts. The dosage has been increased from a previous amount. For sleep, he takes Clonidine (Catapres) at bedtime, which was initially effective but is becoming less effective over time. Patient is also on Adderall XR 20 mg qd for ADHD.    FAMILY HISTORY:  Family history is significant for father who was recently diagnosed with sleep apnea. Several other family members also have a history of sleep apnea.    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 School is out for the summer.         ADHD: Symptoms managed with Adderall XR 20 mg   Depressive Disorder: tired/fatigued, trouble sleeping   Anxiety Disorder: anxiety/nervousness, fatigue, trouble sleeping   Panic Disorder: denied   Manic Disorder: denied   Psychotic Disorder: denied   Substance Use:  denied         9/11/2023     4:34 PM 9/5/2023     8:53 AM 6/8/2023    10:27 AM   GAD7   1. Feeling nervous, anxious, or on edge?      2. Not being able to stop or control worrying?      3. Worrying too much about different things?      4. Trouble relaxing?      5. Being so restless that it is hard to sit still?      6. Becoming easily annoyed or irritable?      7. Feeling afraid as if something awful might happen?      8. If you checked off any problems, how difficult have these problems made it for you to do your work, take care of things at home, or get along with other people?      WENDIE-7 Score           "Information is confidential and restricted. Go to Review Flowsheets to unlock data.     Review of Systems   Constitutional:  Positive for fatigue. Negative for activity change, appetite change and unexpected weight change.   Respiratory:  Negative for shortness of breath.    Psychiatric/Behavioral:  Positive for sleep disturbance. Negative for agitation, behavioral problems, confusion, decreased concentration, dysphoric mood, hallucinations, self-injury and suicidal ideas. The patient is nervous/anxious. The patient is not hyperactive.       Constitutional:  Vitals:  Most recent vital signs were reviewed.   Last 3 sets of Vitals        2/25/2025     2:48 PM 3/5/2025     3:13 PM 3/31/2025     1:49 PM   Vitals - 1 value per visit   SYSTOLIC 124 128 137   DIASTOLIC 80 88 85   Pulse 116 92 103   Temp 98.6 °F (37 °C) 98.7 °F (37.1 °C)    Resp 18     SPO2 99 % 98 %    Weight (lb) 257.28 253.75    Weight (kg) 116.7 115.1    Height 5' 11" (1.803 m) 5' 11" (1.803 m)    BMI (Calculated) 35.9 35.4    Pain Score Zero Zero           Psychiatric:  Oriented: x 3   Attitude: cooperative   Eye Contact: good   Behavior: wnl   Mood: "really good"  Affect: appropriate range   Attention: intact   Concentration: grossly intact   Thought Process: goal directed   Speech: intelligible  Volume: WNL   Quantity: WNL   Rhythm: WNL  Insight: fair to good   Threats: no SI / HI   Memory: Intact  Psychosis: denies all   Estimate of Intellectual Function: average   Judgment:  fair to good  Relevant Elements of Neurological Exam: normal gait     Allergy Review:   Review of patient's allergies indicates:  No Known Allergies     Medical Problem List:   Problem List[1]     Encounter Diagnoses   Name Primary?    Attention deficit hyperactivity disorder (ADHD), predominantly inattentive type     Insomnia, unspecified type     Current moderate episode of major depressive disorder without prior episode     Generalized anxiety disorder with panic attacks Yes "     Assessment & Plan    F41.9 Anxiety disorder, unspecified  G47.00 Insomnia, unspecified  F90.9 Attention-deficit hyperactivity disorder, unspecified type  F33.1 Major depressive disorder without prior episode    IMPRESSION:  - Assessed response to Klonopin (0.125 mg equivalent) for anxiety management.  - Evaluated effectiveness of increased Prozac dosage (80 mg) for general anxiety and worrying thoughts.  - Considered impact of summer break from school on overall well-being.  - Sleep disturbances reported despite Clonidine use, potential for sleep apnea given family history and symptoms.  - Evaluated mood and monitored for any side effects from medication changes.    PLAN SUMMARY:  1. Continue Klonopin at current dose (equivalent to 0.125 mg)  2. Continue Prozac at 80 mg  3. Continue clonidine (Catapres) 0.1 mg qhs  4. Continue Adderall XR 20 mg qd  5. Follow up in 3 months or sooner if needed    PLAN NOTE:  1. Continued Klonopin at current dose (equivalent to 0.125 mg).  2. Continued Prozac 80 mg.  3. Continued clonidine (Catapres) 0.1 mg qhs.  4. Continued Adderall XR 20 mg qd.  5. Follow up in 3 months or sooner if needed.        Follow up in about 3 months (around 8/26/2025) for Medication follow up.     Medication List with Changes/Refills   Current Medications    DICYCLOMINE (BENTYL) 20 MG TABLET    Take 1 tablet (20 mg total) by mouth 3 (three) times daily as needed (abdominal pain).    FLUTICASONE PROPIONATE (FLONASE) 50 MCG/ACTUATION NASAL SPRAY    2 sprays (100 mcg total) by Each Nostril route once daily.    MELOXICAM (MOBIC) 15 MG TABLET    Take 1 tablet (15 mg total) by mouth daily as needed (wrist pain). Take with food.    ONDANSETRON (ZOFRAN-ODT) 4 MG TBDL    Take 4 mg by mouth 3 (three) times daily as needed.    ONDANSETRON (ZOFRAN-ODT) 4 MG TBDL    Take 1 tablet (4 mg total) by mouth every 8 (eight) hours as needed (nausea).   Changed and/or Refilled Medications    Modified Medication Previous  Medication    CLONAZEPAM (KLONOPIN) 0.5 MG TABLET clonazePAM (KLONOPIN) 0.5 MG tablet       Take 0.5-1 tablets (0.25-0.5 mg total) by mouth 2 (two) times daily as needed for Anxiety.    Take 0.5-1 tablets (0.25-0.5 mg total) by mouth 2 (two) times daily as needed for Anxiety.    CLONIDINE (CATAPRES) 0.1 MG TABLET cloNIDine (CATAPRES) 0.1 MG tablet       Take 1 tablet (0.1 mg total) by mouth every evening.    Take 1 tablet (0.1 mg total) by mouth every evening.    DEXTROAMPHETAMINE-AMPHETAMINE (ADDERALL XR) 20 MG 24 HR CAPSULE dextroamphetamine-amphetamine (ADDERALL XR) 20 MG 24 hr capsule       Take 1 capsule (20 mg total) by mouth every morning.    Take 1 capsule (20 mg total) by mouth every morning.    DEXTROAMPHETAMINE-AMPHETAMINE (ADDERALL XR) 20 MG 24 HR CAPSULE dextroamphetamine-amphetamine (ADDERALL XR) 20 MG 24 hr capsule       Take 1 capsule (20 mg total) by mouth every morning.    Take 1 capsule (20 mg total) by mouth every morning.    DEXTROAMPHETAMINE-AMPHETAMINE (ADDERALL XR) 20 MG 24 HR CAPSULE dextroamphetamine-amphetamine (ADDERALL XR) 20 MG 24 hr capsule       Take 1 capsule (20 mg total) by mouth every morning.    Take 1 capsule (20 mg total) by mouth every morning.    FLUOXETINE 40 MG CAPSULE FLUoxetine 40 MG capsule       Take 2 capsules (80 mg total) by mouth once daily.    Take 2 capsules (80 mg total) by mouth once daily.      Time spent with pt including note preparation: 30 minutes     Alethea Torres, PhD, MP  Medical Psychologist    This note was generated with the assistance of ambient listening technology. Verbal consent was obtained by the patient and accompanying visitor(s) for the recording of patient appointment to facilitate this note. I attest to having reviewed and edited the generated note for accuracy, though some syntax or spelling errors may persist. Please contact the author of this note for any clarification.     Visit type: audiovisual    Face to Face time with patient:  25  30 minutes of total time spent on the encounter, which includes face to face time and non-face to face time preparing to see the patient (eg, review of tests), Obtaining and/or reviewing separately obtained history, Documenting clinical information in the electronic or other health record, Independently interpreting results (not separately reported) and communicating results to the patient/family/caregiver, or Care coordination (not separately reported).     Each patient to whom he or she provides medical services by telemedicine is:  (1) informed of the relationship between the physician and patient and the respective role of any other health care provider with respect to management of the patient; and (2) notified that he or she may decline to receive medical services by telemedicine and may withdraw from such care at any time.                             [1]   Patient Active Problem List  Diagnosis    Current moderate episode of major depressive disorder without prior episode    Attention deficit hyperactivity disorder (ADHD), predominantly hyperactive type    Generalized anxiety disorder